# Patient Record
Sex: FEMALE | Race: WHITE | Employment: FULL TIME | URBAN - METROPOLITAN AREA
[De-identification: names, ages, dates, MRNs, and addresses within clinical notes are randomized per-mention and may not be internally consistent; named-entity substitution may affect disease eponyms.]

---

## 2018-10-12 ENCOUNTER — TELEPHONE (OUTPATIENT)
Dept: INTERNAL MEDICINE | Age: 33
End: 2018-10-12

## 2019-04-05 ENCOUNTER — HOSPITAL ENCOUNTER (OUTPATIENT)
Age: 34
Discharge: HOME | End: 2019-04-07

## 2019-04-05 PROCEDURE — 86706 HEP B SURFACE ANTIBODY: CPT

## 2019-04-05 PROCEDURE — 86762 RUBELLA ANTIBODY: CPT

## 2019-04-05 PROCEDURE — 86765 RUBEOLA ANTIBODY: CPT

## 2019-04-05 PROCEDURE — 86735 MUMPS ANTIBODY: CPT

## 2019-04-05 PROCEDURE — 86317 IMMUNOASSAY INFECTIOUS AGENT: CPT

## 2019-04-05 PROCEDURE — 86787 VARICELLA-ZOSTER ANTIBODY: CPT

## 2019-04-09 LAB
MEASLES IMMUNE (IGG): NORMAL
MUMPS AB IGG: NORMAL
RUBELLA ANTIBODY IGG: NORMAL
VARICELLA-ZOSTER VIRUS AB, IGG: NORMAL

## 2019-04-11 LAB — MISCELLANEOUS LAB TEST RESULT: NORMAL

## 2019-11-29 ENCOUNTER — HOSPITAL ENCOUNTER (EMERGENCY)
Age: 34
Discharge: HOME | End: 2019-11-29

## 2019-11-29 VITALS
SYSTOLIC BLOOD PRESSURE: 120 MMHG | DIASTOLIC BLOOD PRESSURE: 78 MMHG | RESPIRATION RATE: 20 BRPM | WEIGHT: 172 LBS | OXYGEN SATURATION: 99 % | TEMPERATURE: 97.9 F | BODY MASS INDEX: 26.15 KG/M2 | HEART RATE: 60 BPM

## 2019-11-29 DIAGNOSIS — J01.10 ACUTE NON-RECURRENT FRONTAL SINUSITIS: Primary | ICD-10-CM

## 2019-11-29 PROCEDURE — 99212 OFFICE O/P EST SF 10 MIN: CPT

## 2019-11-29 RX ORDER — PREDNISONE 20 MG/1
TABLET ORAL
Qty: 10 TABLET | Refills: 0 | Status: SHIPPED | OUTPATIENT
Start: 2019-11-29 | End: 2020-03-18

## 2019-11-29 RX ORDER — PSEUDOEPHEDRINE HCL 60 MG/1
60 TABLET ORAL EVERY 8 HOURS
Qty: 12 TABLET | Refills: 0 | Status: SHIPPED | OUTPATIENT
Start: 2019-11-29 | End: 2020-03-18

## 2019-11-29 RX ORDER — AMOXICILLIN AND CLAVULANATE POTASSIUM 875; 125 MG/1; MG/1
1 TABLET, FILM COATED ORAL 2 TIMES DAILY
Qty: 14 TABLET | Refills: 0 | Status: SHIPPED | OUTPATIENT
Start: 2019-11-29 | End: 2019-12-06

## 2019-11-29 ASSESSMENT — PAIN DESCRIPTION - PAIN TYPE: TYPE: ACUTE PAIN

## 2019-11-29 ASSESSMENT — PAIN SCALES - GENERAL: PAINLEVEL_OUTOF10: 9

## 2019-11-29 ASSESSMENT — PAIN DESCRIPTION - LOCATION: LOCATION: HEAD

## 2020-03-18 ENCOUNTER — OFFICE VISIT (OUTPATIENT)
Dept: FAMILY MEDICINE CLINIC | Age: 35
End: 2020-03-18
Payer: COMMERCIAL

## 2020-03-18 VITALS
RESPIRATION RATE: 18 BRPM | WEIGHT: 186 LBS | BODY MASS INDEX: 28.19 KG/M2 | SYSTOLIC BLOOD PRESSURE: 116 MMHG | HEIGHT: 68 IN | HEART RATE: 62 BPM | DIASTOLIC BLOOD PRESSURE: 68 MMHG | TEMPERATURE: 97.8 F | OXYGEN SATURATION: 99 %

## 2020-03-18 PROCEDURE — 99203 OFFICE O/P NEW LOW 30 MIN: CPT | Performed by: INTERNAL MEDICINE

## 2020-03-18 ASSESSMENT — ENCOUNTER SYMPTOMS
SHORTNESS OF BREATH: 0
EYE DISCHARGE: 0
SINUS PAIN: 0
EYE PAIN: 0
ABDOMINAL PAIN: 0
BLOOD IN STOOL: 0
SORE THROAT: 0
NAUSEA: 0

## 2020-03-18 ASSESSMENT — PATIENT HEALTH QUESTIONNAIRE - PHQ9
SUM OF ALL RESPONSES TO PHQ QUESTIONS 1-9: 0
1. LITTLE INTEREST OR PLEASURE IN DOING THINGS: 0
SUM OF ALL RESPONSES TO PHQ9 QUESTIONS 1 & 2: 0
SUM OF ALL RESPONSES TO PHQ QUESTIONS 1-9: 0
2. FEELING DOWN, DEPRESSED OR HOPELESS: 0

## 2020-03-18 NOTE — PROGRESS NOTES
Chief Complaint   Patient presents with   [address]     no pcp since pedit. HPI:  Patient is here  As new patient   Had noticed numbness and occ tingling in feet and hands ryder at night    Had h/o Migraine in past  Recently restarted of having HAs    FH - noted           Allergy and Medications are reviewed and updated. Past Medical History, Surgical History, and Family History has been reviewed and updated. Review of Systems:  Review of Systems   Constitutional: Negative for chills and fever. HENT: Negative for congestion, sinus pain and sore throat. Eyes: Negative for pain and discharge. Respiratory: Negative for shortness of breath (No new SOb). Cardiovascular: Negative for chest pain. Gastrointestinal: Negative for abdominal pain, blood in stool and nausea. Genitourinary: Negative for flank pain and frequency. Musculoskeletal: Negative for neck pain. Hematological: Does not bruise/bleed easily. Psychiatric/Behavioral: Negative for suicidal ideas. Vitals:    03/18/20 1115   BP: 116/68   Pulse: 62   Resp: 18   Temp: 97.8 °F (36.6 °C)   SpO2: 99%   Weight: 186 lb (84.4 kg)   Height: 5' 8\" (1.727 m)       Physical Exam  Vitals signs reviewed. Constitutional:       Appearance: She is well-developed. HENT:      Head: Normocephalic and atraumatic. Mouth/Throat:      Pharynx: No oropharyngeal exudate. Eyes:      Conjunctiva/sclera: Conjunctivae normal.      Pupils: Pupils are equal, round, and reactive to light. Neck:      Vascular: No JVD. Cardiovascular:      Rate and Rhythm: Normal rate and regular rhythm. Pulmonary:      Effort: Pulmonary effort is normal.      Breath sounds: Normal breath sounds. No rales. Abdominal:      General: Bowel sounds are normal.      Palpations: Abdomen is soft. Musculoskeletal: Normal range of motion. Lymphadenopathy:      Cervical: No cervical adenopathy. Skin:     General: Skin is warm and dry.    Neurological: Mental Status: She is alert and oriented to person, place, and time. Psychiatric:         Behavior: Behavior normal.          Labs :    Lab Results   Component Value Date    WBC 5.5 2017    HGB 12.3 2017    HCT 37.5 2017     2017     2017    K 4.4 2017     2017    CREATININE 0.8 2017    BUN 13 2017    CO2 29 2017    TSH 0.683 2017     Lab Results   Component Value Date    COLORU Yellow 2017    NITRU Negative 2017    GLUCOSEU Negative 2017    KETUA Negative 2017    UROBILINOGEN 0.2 2017    BILIRUBINUR Negative 2017           ASSESSMENT     Patient Active Problem List    Diagnosis Date Noted    Antepartum genitourinary tract infection 2013    Previous  delivery, antepartum condition or complication         Diagnosis:     ICD-10-CM    1. Establishing care with new doctor, encounter for Z76.89 CBC Auto Differential     Comprehensive Metabolic Panel, Fasting     Lipid, Fasting     TSH without Reflex     Urinalysis with Microscopic   2. Numbness and tingling in both hands R20.0 Vitamin B12 & Folate    R20.2 Hemoglobin A1C   3. Numbness in feet R20.0    4. Nonintractable episodic headache, unspecified headache type R51 Rheumatoid Factor     Sedimentation Rate     DAISY       PLAN:       Pt is stable on current medical treatment. Continue current treatment plan    Side effects/Adverse effects/Precautions are reviewed with patient. Low salt, Low Carb diet an low fat diet  Continue medications as advised and take them regularly  Regular exercises as advised    Discussed natural and expected course of this diagnosis and need to alert me if symptoms do not follow expected course, or if any worse. Smoking cessation if applicable, discussed with patient. Advise to have ( Fasting) lab test prior to next visit.          There are no Patient Instructions on file for this

## 2020-05-14 ENCOUNTER — HOSPITAL ENCOUNTER (OUTPATIENT)
Age: 35
Discharge: HOME | End: 2020-05-16
Payer: COMMERCIAL

## 2020-05-14 ENCOUNTER — OFFICE VISIT (OUTPATIENT)
Dept: FAMILY MEDICINE CLINIC | Age: 35
End: 2020-05-14
Payer: COMMERCIAL

## 2020-05-14 VITALS
HEART RATE: 74 BPM | RESPIRATION RATE: 18 BRPM | WEIGHT: 185.5 LBS | OXYGEN SATURATION: 98 % | TEMPERATURE: 97.2 F | DIASTOLIC BLOOD PRESSURE: 74 MMHG | BODY MASS INDEX: 28.11 KG/M2 | SYSTOLIC BLOOD PRESSURE: 118 MMHG | HEIGHT: 68 IN

## 2020-05-14 LAB
ALBUMIN SERPL-MCNC: 4.4 G/DL (ref 3.5–5.2)
ALP BLD-CCNC: 60 U/L (ref 35–104)
ALT SERPL-CCNC: 14 U/L (ref 0–32)
ANION GAP SERPL CALCULATED.3IONS-SCNC: 17 MMOL/L (ref 7–16)
AST SERPL-CCNC: 14 U/L (ref 0–31)
BACTERIA: ABNORMAL /HPF
BASOPHILS ABSOLUTE: 0.02 E9/L (ref 0–0.2)
BASOPHILS RELATIVE PERCENT: 0.4 % (ref 0–2)
BILIRUB SERPL-MCNC: 0.7 MG/DL (ref 0–1.2)
BILIRUBIN URINE: NEGATIVE
BLOOD, URINE: NEGATIVE
BUN BLDV-MCNC: 10 MG/DL (ref 6–20)
CALCIUM SERPL-MCNC: 9.4 MG/DL (ref 8.6–10.2)
CHLORIDE BLD-SCNC: 103 MMOL/L (ref 98–107)
CHOLESTEROL, FASTING: 159 MG/DL (ref 0–199)
CLARITY: CLEAR
CO2: 20 MMOL/L (ref 22–29)
COLOR: YELLOW
CREAT SERPL-MCNC: 0.9 MG/DL (ref 0.5–1)
EOSINOPHILS ABSOLUTE: 0.16 E9/L (ref 0.05–0.5)
EOSINOPHILS RELATIVE PERCENT: 3.4 % (ref 0–6)
EPITHELIAL CELLS, UA: ABNORMAL /HPF
FOLATE: 10.5 NG/ML (ref 4.8–24.2)
GFR AFRICAN AMERICAN: >60
GFR NON-AFRICAN AMERICAN: >60 ML/MIN/1.73
GLUCOSE FASTING: 94 MG/DL (ref 74–99)
GLUCOSE URINE: NEGATIVE MG/DL
HBA1C MFR BLD: 5 % (ref 4–5.6)
HCT VFR BLD CALC: 42.3 % (ref 34–48)
HDLC SERPL-MCNC: 43 MG/DL
HEMOGLOBIN: 13.2 G/DL (ref 11.5–15.5)
IMMATURE GRANULOCYTES #: 0.02 E9/L
IMMATURE GRANULOCYTES %: 0.4 % (ref 0–5)
KETONES, URINE: NEGATIVE MG/DL
LDL CHOLESTEROL CALCULATED: 95 MG/DL (ref 0–99)
LEUKOCYTE ESTERASE, URINE: NEGATIVE
LYMPHOCYTES ABSOLUTE: 1.41 E9/L (ref 1.5–4)
LYMPHOCYTES RELATIVE PERCENT: 29.7 % (ref 20–42)
MCH RBC QN AUTO: 28.6 PG (ref 26–35)
MCHC RBC AUTO-ENTMCNC: 31.2 % (ref 32–34.5)
MCV RBC AUTO: 91.8 FL (ref 80–99.9)
MONOCYTES ABSOLUTE: 0.33 E9/L (ref 0.1–0.95)
MONOCYTES RELATIVE PERCENT: 6.9 % (ref 2–12)
NEUTROPHILS ABSOLUTE: 2.81 E9/L (ref 1.8–7.3)
NEUTROPHILS RELATIVE PERCENT: 59.2 % (ref 43–80)
NITRITE, URINE: NEGATIVE
PDW BLD-RTO: 12.8 FL (ref 11.5–15)
PH UA: 7 (ref 5–9)
PLATELET # BLD: 226 E9/L (ref 130–450)
PMV BLD AUTO: 10.6 FL (ref 7–12)
POTASSIUM SERPL-SCNC: 4.3 MMOL/L (ref 3.5–5)
PROTEIN UA: NEGATIVE MG/DL
RBC # BLD: 4.61 E12/L (ref 3.5–5.5)
RBC UA: ABNORMAL /HPF (ref 0–2)
RHEUMATOID FACTOR: <10 IU/ML (ref 0–13)
SEDIMENTATION RATE, ERYTHROCYTE: 10 MM/HR (ref 0–20)
SODIUM BLD-SCNC: 140 MMOL/L (ref 132–146)
SPECIFIC GRAVITY UA: 1.02 (ref 1–1.03)
TOTAL PROTEIN: 7.5 G/DL (ref 6.4–8.3)
TRIGLYCERIDE, FASTING: 107 MG/DL (ref 0–149)
TSH SERPL DL<=0.05 MIU/L-ACNC: 0.89 UIU/ML (ref 0.27–4.2)
UROBILINOGEN, URINE: 0.2 E.U./DL
VITAMIN B-12: 555 PG/ML (ref 211–946)
VLDLC SERPL CALC-MCNC: 21 MG/DL
WBC # BLD: 4.8 E9/L (ref 4.5–11.5)
WBC UA: ABNORMAL /HPF (ref 0–5)

## 2020-05-14 PROCEDURE — G8419 CALC BMI OUT NRM PARAM NOF/U: HCPCS | Performed by: INTERNAL MEDICINE

## 2020-05-14 PROCEDURE — 85025 COMPLETE CBC W/AUTO DIFF WBC: CPT

## 2020-05-14 PROCEDURE — 80061 LIPID PANEL: CPT

## 2020-05-14 PROCEDURE — 80053 COMPREHEN METABOLIC PANEL: CPT

## 2020-05-14 PROCEDURE — 82746 ASSAY OF FOLIC ACID SERUM: CPT

## 2020-05-14 PROCEDURE — 36415 COLL VENOUS BLD VENIPUNCTURE: CPT

## 2020-05-14 PROCEDURE — 82607 VITAMIN B-12: CPT

## 2020-05-14 PROCEDURE — 85651 RBC SED RATE NONAUTOMATED: CPT

## 2020-05-14 PROCEDURE — 86431 RHEUMATOID FACTOR QUANT: CPT

## 2020-05-14 PROCEDURE — 99213 OFFICE O/P EST LOW 20 MIN: CPT | Performed by: INTERNAL MEDICINE

## 2020-05-14 PROCEDURE — 84443 ASSAY THYROID STIM HORMONE: CPT

## 2020-05-14 PROCEDURE — G8427 DOCREV CUR MEDS BY ELIG CLIN: HCPCS | Performed by: INTERNAL MEDICINE

## 2020-05-14 PROCEDURE — 81001 URINALYSIS AUTO W/SCOPE: CPT

## 2020-05-14 PROCEDURE — 1036F TOBACCO NON-USER: CPT | Performed by: INTERNAL MEDICINE

## 2020-05-14 PROCEDURE — 86038 ANTINUCLEAR ANTIBODIES: CPT

## 2020-05-14 PROCEDURE — 83036 HEMOGLOBIN GLYCOSYLATED A1C: CPT

## 2020-05-14 ASSESSMENT — ENCOUNTER SYMPTOMS
EYE PAIN: 0
BLOOD IN STOOL: 0
ABDOMINAL PAIN: 0
SORE THROAT: 0
EYE DISCHARGE: 0
NAUSEA: 0
SINUS PAIN: 0
SHORTNESS OF BREATH: 0

## 2020-05-14 NOTE — PROGRESS NOTES
WBC 4.8 2020    HGB 13.2 2020    HCT 42.3 2020     2020     2017    K 4.4 2017     2017    CREATININE 0.8 2017    BUN 13 2017    CO2 29 2017    TSH 0.683 2017    LABA1C 5.0 2020     Lab Results   Component Value Date    COLORU Yellow 2020    NITRU Negative 2020    GLUCOSEU Negative 2020    KETUA Negative 2020    UROBILINOGEN 0.2 2020    BILIRUBINUR Negative 2020           ASSESSMENT     Patient Active Problem List    Diagnosis Date Noted    Antepartum genitourinary tract infection 2013    Previous  delivery, antepartum condition or complication         Diagnosis:     ICD-10-CM    1. Follow up Z09    2. Nonintractable episodic headache, unspecified headache type R51        PLAN:       Possible migraines attack  Adv to keep Headache diary      Pt is stable on current medical treatment. Continue current treatment plan    Side effects/Adverse effects/Precautions are reviewed with patient. Low salt, Low Carb diet an low fat diet  Continue medications as advised and take them regularly  Regular exercises as advised    Discussed natural and expected course of this diagnosis and need to alert me if symptoms do not follow expected course, or if any worse. Smoking cessation if applicable, discussed with patient. There are no Patient Instructions on file for this visit. Return in about 3 months (around 2020).

## 2020-05-15 LAB — ANTI-NUCLEAR ANTIBODY (ANA): NEGATIVE

## 2020-08-04 ENCOUNTER — OFFICE VISIT (OUTPATIENT)
Dept: FAMILY MEDICINE CLINIC | Age: 35
End: 2020-08-04
Payer: COMMERCIAL

## 2020-08-04 VITALS
DIASTOLIC BLOOD PRESSURE: 60 MMHG | OXYGEN SATURATION: 99 % | HEIGHT: 68 IN | HEART RATE: 71 BPM | WEIGHT: 189 LBS | BODY MASS INDEX: 28.64 KG/M2 | RESPIRATION RATE: 20 BRPM | TEMPERATURE: 97.1 F | SYSTOLIC BLOOD PRESSURE: 106 MMHG

## 2020-08-04 PROCEDURE — 1036F TOBACCO NON-USER: CPT | Performed by: INTERNAL MEDICINE

## 2020-08-04 PROCEDURE — G8419 CALC BMI OUT NRM PARAM NOF/U: HCPCS | Performed by: INTERNAL MEDICINE

## 2020-08-04 PROCEDURE — 99213 OFFICE O/P EST LOW 20 MIN: CPT | Performed by: INTERNAL MEDICINE

## 2020-08-04 PROCEDURE — G8427 DOCREV CUR MEDS BY ELIG CLIN: HCPCS | Performed by: INTERNAL MEDICINE

## 2020-08-04 RX ORDER — SUMATRIPTAN 50 MG/1
50 TABLET, FILM COATED ORAL DAILY PRN
Qty: 6 TABLET | Refills: 0 | Status: SHIPPED
Start: 2020-08-04 | End: 2020-09-09 | Stop reason: SDUPTHER

## 2020-08-04 RX ORDER — TOPIRAMATE 25 MG/1
TABLET ORAL
Qty: 60 TABLET | Refills: 0 | Status: SHIPPED
Start: 2020-08-04 | End: 2020-09-09 | Stop reason: SDUPTHER

## 2020-08-04 ASSESSMENT — ENCOUNTER SYMPTOMS
SHORTNESS OF BREATH: 0
BLOOD IN STOOL: 0
ABDOMINAL PAIN: 0
SORE THROAT: 0
NAUSEA: 0
SINUS PAIN: 0
EYE PAIN: 0
EYE DISCHARGE: 0

## 2020-08-04 NOTE — PROGRESS NOTES
Chief Complaint   Patient presents with    Follow-up    Migraine     Since last visit patient has experianced 5 episodic flare up's lasting from 1.5 days to 3 days        HPI:  Patient is here for follow-up     Has c/o migraine , as above  Missed days at work    Usually start on left side  Associate with screen time, lack of sleep   Occ nausea, vomiting  Sensitive to Lights, smell, during attack    Has HA for 3 yrs      Allergy and Medications are reviewed and updated. Past Medical History, Surgical History, and Family History has been reviewed and updated. Review of Systems:  Review of Systems   Constitutional: Negative for chills and fever. HENT: Negative for congestion, sinus pain and sore throat. Eyes: Negative for pain and discharge. Respiratory: Negative for shortness of breath (No new SOb). Cardiovascular: Negative for chest pain. Gastrointestinal: Negative for abdominal pain, blood in stool and nausea. Genitourinary: Negative for flank pain and frequency. Musculoskeletal: Negative for neck pain. Hematological: Does not bruise/bleed easily. Psychiatric/Behavioral: Negative for suicidal ideas. Vitals:    08/04/20 1025   BP: 106/60   Pulse: 71   Resp: 20   Temp: 97.1 °F (36.2 °C)   TempSrc: Temporal   SpO2: 99%   Weight: 189 lb (85.7 kg)   Height: 5' 8\" (1.727 m)       Physical Exam  Vitals signs reviewed. Constitutional:       Appearance: She is well-developed. HENT:      Head: Normocephalic and atraumatic. Mouth/Throat:      Pharynx: No oropharyngeal exudate. Eyes:      Conjunctiva/sclera: Conjunctivae normal.      Pupils: Pupils are equal, round, and reactive to light. Neck:      Vascular: No JVD. Cardiovascular:      Rate and Rhythm: Normal rate and regular rhythm. Pulmonary:      Effort: Pulmonary effort is normal.      Breath sounds: Normal breath sounds. No rales. Abdominal:      General: Bowel sounds are normal.      Palpations: Abdomen is soft. Musculoskeletal: Normal range of motion. Lymphadenopathy:      Cervical: No cervical adenopathy. Skin:     General: Skin is warm and dry. Neurological:      Mental Status: She is alert and oriented to person, place, and time. Psychiatric:         Behavior: Behavior normal.          Labs :    Lab Results   Component Value Date    WBC 4.8 2020    HGB 13.2 2020    HCT 42.3 2020     2020    HDL 43 2020    ALT 14 2020    AST 14 2020     2020    K 4.3 2020     2020    CREATININE 0.9 2020    BUN 10 2020    CO2 20 (L) 2020    TSH 0.892 2020    GLUF 94 2020    LABA1C 5.0 2020     Lab Results   Component Value Date    COLORU Yellow 2020    NITRU Negative 2020    GLUCOSEU Negative 2020    KETUA Negative 2020    UROBILINOGEN 0.2 2020    BILIRUBINUR Negative 2020             ASSESSMENT     Patient Active Problem List    Diagnosis Date Noted    Antepartum genitourinary tract infection 2013    Previous  delivery, antepartum condition or complication         Diagnosis:     ICD-10-CM    1. Other migraine without status migrainosus, not intractable  G43.809 CT HEAD WO CONTRAST       PLAN:      Topamax 25 qd--> bid after one week    Imitrex 50 as needed for migraine. ,No more than two tabs in 24 hrs    CT head- no contrast     Pt is stable on current medical treatment. Continue current treatment plan    Side effects/Adverse effects/Precautions are reviewed with patient. Low salt, Low Carb diet an low fat diet  Continue medications as advised and take them regularly  Regular exercises as advised    Discussed natural and expected course of this diagnosis and need to alert me if symptoms do not follow expected course, or if any worse. Smoking cessation if applicable, discussed with patient.          There are no Patient Instructions on file for

## 2020-08-10 ENCOUNTER — TELEPHONE (OUTPATIENT)
Dept: FAMILY MEDICINE CLINIC | Age: 35
End: 2020-08-10

## 2020-09-09 ENCOUNTER — OFFICE VISIT (OUTPATIENT)
Dept: FAMILY MEDICINE CLINIC | Age: 35
End: 2020-09-09
Payer: COMMERCIAL

## 2020-09-09 VITALS
WEIGHT: 185 LBS | HEART RATE: 69 BPM | RESPIRATION RATE: 16 BRPM | TEMPERATURE: 98.8 F | HEIGHT: 68 IN | DIASTOLIC BLOOD PRESSURE: 64 MMHG | BODY MASS INDEX: 28.04 KG/M2 | OXYGEN SATURATION: 98 % | SYSTOLIC BLOOD PRESSURE: 98 MMHG

## 2020-09-09 PROCEDURE — 1036F TOBACCO NON-USER: CPT | Performed by: INTERNAL MEDICINE

## 2020-09-09 PROCEDURE — 99213 OFFICE O/P EST LOW 20 MIN: CPT | Performed by: INTERNAL MEDICINE

## 2020-09-09 PROCEDURE — G8419 CALC BMI OUT NRM PARAM NOF/U: HCPCS | Performed by: INTERNAL MEDICINE

## 2020-09-09 PROCEDURE — G8427 DOCREV CUR MEDS BY ELIG CLIN: HCPCS | Performed by: INTERNAL MEDICINE

## 2020-09-09 RX ORDER — TOPIRAMATE 25 MG/1
TABLET ORAL
Qty: 60 TABLET | Refills: 0 | Status: CANCELLED | OUTPATIENT
Start: 2020-09-09

## 2020-09-09 RX ORDER — TOPIRAMATE 25 MG/1
TABLET ORAL
Qty: 90 TABLET | Refills: 2 | Status: SHIPPED
Start: 2020-09-09 | End: 2021-01-18 | Stop reason: SDUPTHER

## 2020-09-09 RX ORDER — SUMATRIPTAN 50 MG/1
50 TABLET, FILM COATED ORAL DAILY PRN
Qty: 6 TABLET | Refills: 1 | Status: SHIPPED
Start: 2020-09-09 | End: 2020-11-18 | Stop reason: SDUPTHER

## 2020-09-09 ASSESSMENT — ENCOUNTER SYMPTOMS
ABDOMINAL PAIN: 0
SORE THROAT: 0
NAUSEA: 0
BLOOD IN STOOL: 0
EYE DISCHARGE: 0
SHORTNESS OF BREATH: 0
SINUS PAIN: 0
EYE PAIN: 0

## 2020-09-09 NOTE — PROGRESS NOTES
Chief Complaint   Patient presents with    Follow-up    Medication Refill       HPI:  Patient is here for follow-up     Pt is tolerating Topamax    Migraine intensity and frequency -- Are better         Allergy and Medications are reviewed and updated. Past Medical History, Surgical History, and Family History has been reviewed and updated. Review of Systems:  Review of Systems   Constitutional: Negative for chills and fever. HENT: Negative for congestion, sinus pain and sore throat. Eyes: Negative for pain and discharge. Respiratory: Negative for shortness of breath (No new SOb). Cardiovascular: Negative for chest pain. Gastrointestinal: Negative for abdominal pain, blood in stool and nausea. Genitourinary: Negative for flank pain and frequency. Musculoskeletal: Negative for neck pain. Hematological: Does not bruise/bleed easily. Psychiatric/Behavioral: Negative for suicidal ideas. Vitals:    09/09/20 1517   BP: 98/64   Pulse: 69   Resp: 16   Temp: 98.8 °F (37.1 °C)   TempSrc: Temporal   SpO2: 98%   Weight: 185 lb (83.9 kg)   Height: 5' 8\" (1.727 m)       Physical Exam  Vitals signs reviewed. Constitutional:       Appearance: She is well-developed. HENT:      Head: Normocephalic and atraumatic. Mouth/Throat:      Pharynx: No oropharyngeal exudate. Eyes:      Conjunctiva/sclera: Conjunctivae normal.      Pupils: Pupils are equal, round, and reactive to light. Neck:      Vascular: No JVD. Cardiovascular:      Rate and Rhythm: Normal rate and regular rhythm. Pulmonary:      Effort: Pulmonary effort is normal.      Breath sounds: Normal breath sounds. No rales. Abdominal:      General: Bowel sounds are normal.      Palpations: Abdomen is soft. Musculoskeletal: Normal range of motion. Lymphadenopathy:      Cervical: No cervical adenopathy. Skin:     General: Skin is warm and dry.    Neurological:      Mental Status: She is alert and oriented to person, place, and time.   Psychiatric:         Behavior: Behavior normal.          Labs :    Lab Results   Component Value Date    WBC 4.8 2020    HGB 13.2 2020    HCT 42.3 2020     2020    HDL 43 2020    ALT 14 2020    AST 14 2020     2020    K 4.3 2020     2020    CREATININE 0.9 2020    BUN 10 2020    CO2 20 (L) 2020    TSH 0.892 2020    GLUF 94 2020    LABA1C 5.0 2020     Lab Results   Component Value Date    COLORU Yellow 2020    NITRU Negative 2020    GLUCOSEU Negative 2020    KETUA Negative 2020    UROBILINOGEN 0.2 2020    BILIRUBINUR Negative 2020           ASSESSMENT     Patient Active Problem List    Diagnosis Date Noted    Antepartum genitourinary tract infection 2013    Previous  delivery, antepartum condition or complication         Diagnosis:     ICD-10-CM    1. Other migraine without status migrainosus, not intractable  G43.809 Comprehensive Metabolic Panel, Fasting     CBC Auto Differential       PLAN:      Increase Topamax to 25 mg in AM and 50 mg at night    [place] for MRI    Pt is stable on current medical treatment. Continue current treatment plan    Side effects/Adverse effects/Precautions are reviewed with patient. Low salt, Low Carb diet an low fat diet  Continue medications as advised and take them regularly  Regular exercises as advised    Discussed natural and expected course of this diagnosis and need to alert me if symptoms do not follow expected course, or if any worse. Smoking cessation if applicable, discussed with patient. There are no Patient Instructions on file for this visit. Return in about 6 weeks (around 10/21/2020).

## 2020-09-24 ENCOUNTER — TELEPHONE (OUTPATIENT)
Dept: FAMILY MEDICINE CLINIC | Age: 35
End: 2020-09-24

## 2020-09-24 NOTE — TELEPHONE ENCOUNTER
Jamil Levy  Has been experiencing dizziness, forget fullness,  Feel like she is irritable. cant seem to focus. This  Has been going on since starting the Topamax.     Pharmacy [facility] on [address]

## 2020-10-29 ENCOUNTER — TELEPHONE (OUTPATIENT)
Dept: ADMINISTRATIVE | Age: 35
End: 2020-10-29

## 2020-10-29 NOTE — TELEPHONE ENCOUNTER
Pt states she had to cancel her last appt due to being called in to work. Was wanting to know if she should do her lab work just prior to her December appt scheduled. Please contact and advise. Thank you.

## 2020-11-05 ENCOUNTER — HOSPITAL ENCOUNTER (OUTPATIENT)
Dept: MAMMOGRAPHY | Age: 35
Discharge: HOME | End: 2020-11-07
Payer: COMMERCIAL

## 2020-11-05 ENCOUNTER — HOSPITAL ENCOUNTER (OUTPATIENT)
Dept: ULTRASOUND IMAGING | Age: 35
Discharge: HOME | End: 2020-11-05
Payer: COMMERCIAL

## 2020-11-05 PROCEDURE — 76642 ULTRASOUND BREAST LIMITED: CPT

## 2020-11-05 PROCEDURE — G0279 TOMOSYNTHESIS, MAMMO: HCPCS

## 2020-11-17 RX ORDER — SUMATRIPTAN 50 MG/1
TABLET, FILM COATED ORAL
Qty: 6 TABLET | Refills: 1 | OUTPATIENT
Start: 2020-11-17

## 2020-11-18 RX ORDER — SUMATRIPTAN 50 MG/1
50 TABLET, FILM COATED ORAL DAILY PRN
Qty: 6 TABLET | Refills: 1 | Status: SHIPPED
Start: 2020-11-18 | End: 2021-01-18 | Stop reason: SDUPTHER

## 2020-11-18 NOTE — TELEPHONE ENCOUNTER
Patient called for refill Imitrex. Patient stated headaches seem to be getting worse and she wants to discuss at next appt.      Last seen 9/9/2020  Next appt 12/7/2020  [facility]

## 2020-12-03 DIAGNOSIS — G43.809 OTHER MIGRAINE WITHOUT STATUS MIGRAINOSUS, NOT INTRACTABLE: ICD-10-CM

## 2020-12-03 LAB
ALBUMIN SERPL-MCNC: 4 G/DL (ref 3.5–5.2)
ALP BLD-CCNC: 63 U/L (ref 35–104)
ALT SERPL-CCNC: 11 U/L (ref 0–32)
ANION GAP SERPL CALCULATED.3IONS-SCNC: 9 MMOL/L (ref 7–16)
AST SERPL-CCNC: 11 U/L (ref 0–31)
BASOPHILS ABSOLUTE: 0.03 E9/L (ref 0–0.2)
BASOPHILS RELATIVE PERCENT: 0.5 % (ref 0–2)
BILIRUB SERPL-MCNC: 0.5 MG/DL (ref 0–1.2)
BUN BLDV-MCNC: 11 MG/DL (ref 6–20)
CALCIUM SERPL-MCNC: 8.9 MG/DL (ref 8.6–10.2)
CHLORIDE BLD-SCNC: 107 MMOL/L (ref 98–107)
CO2: 22 MMOL/L (ref 22–29)
CREAT SERPL-MCNC: 0.9 MG/DL (ref 0.5–1)
EOSINOPHILS ABSOLUTE: 0.13 E9/L (ref 0.05–0.5)
EOSINOPHILS RELATIVE PERCENT: 2.1 % (ref 0–6)
GFR AFRICAN AMERICAN: >60
GFR NON-AFRICAN AMERICAN: >60 ML/MIN/1.73
GLUCOSE FASTING: 92 MG/DL (ref 74–99)
HCT VFR BLD CALC: 38.5 % (ref 34–48)
HEMOGLOBIN: 12.7 G/DL (ref 11.5–15.5)
IMMATURE GRANULOCYTES #: 0.02 E9/L
IMMATURE GRANULOCYTES %: 0.3 % (ref 0–5)
LYMPHOCYTES ABSOLUTE: 1.86 E9/L (ref 1.5–4)
LYMPHOCYTES RELATIVE PERCENT: 30 % (ref 20–42)
MCH RBC QN AUTO: 29.7 PG (ref 26–35)
MCHC RBC AUTO-ENTMCNC: 33 % (ref 32–34.5)
MCV RBC AUTO: 90.2 FL (ref 80–99.9)
MONOCYTES ABSOLUTE: 0.45 E9/L (ref 0.1–0.95)
MONOCYTES RELATIVE PERCENT: 7.3 % (ref 2–12)
NEUTROPHILS ABSOLUTE: 3.7 E9/L (ref 1.8–7.3)
NEUTROPHILS RELATIVE PERCENT: 59.8 % (ref 43–80)
PDW BLD-RTO: 13 FL (ref 11.5–15)
PLATELET # BLD: 220 E9/L (ref 130–450)
PMV BLD AUTO: 10.3 FL (ref 7–12)
POTASSIUM SERPL-SCNC: 4.1 MMOL/L (ref 3.5–5)
RBC # BLD: 4.27 E12/L (ref 3.5–5.5)
SODIUM BLD-SCNC: 138 MMOL/L (ref 132–146)
TOTAL PROTEIN: 7.2 G/DL (ref 6.4–8.3)
WBC # BLD: 6.2 E9/L (ref 4.5–11.5)

## 2020-12-07 ENCOUNTER — OFFICE VISIT (OUTPATIENT)
Dept: FAMILY MEDICINE CLINIC | Age: 35
End: 2020-12-07
Payer: COMMERCIAL

## 2020-12-07 VITALS
RESPIRATION RATE: 16 BRPM | DIASTOLIC BLOOD PRESSURE: 60 MMHG | OXYGEN SATURATION: 100 % | WEIGHT: 193.2 LBS | BODY MASS INDEX: 29.28 KG/M2 | HEIGHT: 68 IN | SYSTOLIC BLOOD PRESSURE: 104 MMHG | HEART RATE: 82 BPM | TEMPERATURE: 97.3 F

## 2020-12-07 PROCEDURE — G8419 CALC BMI OUT NRM PARAM NOF/U: HCPCS | Performed by: INTERNAL MEDICINE

## 2020-12-07 PROCEDURE — G8427 DOCREV CUR MEDS BY ELIG CLIN: HCPCS | Performed by: INTERNAL MEDICINE

## 2020-12-07 PROCEDURE — 1036F TOBACCO NON-USER: CPT | Performed by: INTERNAL MEDICINE

## 2020-12-07 PROCEDURE — 99213 OFFICE O/P EST LOW 20 MIN: CPT | Performed by: INTERNAL MEDICINE

## 2020-12-07 PROCEDURE — G8484 FLU IMMUNIZE NO ADMIN: HCPCS | Performed by: INTERNAL MEDICINE

## 2020-12-07 RX ORDER — CETIRIZINE HYDROCHLORIDE 10 MG/1
10 TABLET ORAL NIGHTLY
COMMUNITY
End: 2021-11-09

## 2020-12-07 RX ORDER — FLUTICASONE PROPIONATE 50 MCG
1 SPRAY, SUSPENSION (ML) NASAL DAILY
Qty: 2 BOTTLE | Refills: 1 | Status: SHIPPED
Start: 2020-12-07 | End: 2022-08-10 | Stop reason: SDUPTHER

## 2020-12-07 ASSESSMENT — ENCOUNTER SYMPTOMS
SINUS PAIN: 0
SHORTNESS OF BREATH: 0
EYE DISCHARGE: 0
BLOOD IN STOOL: 0
EYE PAIN: 0
ABDOMINAL PAIN: 0
SORE THROAT: 0
NAUSEA: 0

## 2020-12-07 NOTE — PROGRESS NOTES
Chief Complaint   Patient presents with    Follow-up       HPI:  Patient is here for follow-up     Pt's HA were bettre with Topamax 75 mg a day, but could not tolearte    Now taking Topamax 25 mgf daily at night    HA - is more frequent    Takes Imitrex  mg as needed        Feeling okay     Allergy and Medications are reviewed and updated. Past Medical History, Surgical History, and Family History has been reviewed and updated. Review of Systems:  Review of Systems   Constitutional: Negative for chills and fever. HENT: Negative for congestion, sinus pain and sore throat. Eyes: Negative for pain and discharge. Respiratory: Negative for shortness of breath (No new SOb). Cardiovascular: Negative for chest pain. Gastrointestinal: Negative for abdominal pain, blood in stool and nausea. Genitourinary: Negative for flank pain and frequency. Musculoskeletal: Negative for neck pain. Hematological: Does not bruise/bleed easily. Psychiatric/Behavioral: Negative for suicidal ideas. Vitals:    12/07/20 0832   BP: 104/60   Pulse: 82   Resp: 16   Temp: 97.3 °F (36.3 °C)   TempSrc: Temporal   SpO2: 100%   Weight: 193 lb 3.2 oz (87.6 kg)   Height: 5' 8\" (1.727 m)       Physical Exam  Vitals signs reviewed. Constitutional:       Appearance: She is well-developed. HENT:      Head: Normocephalic and atraumatic. Mouth/Throat:      Pharynx: No oropharyngeal exudate. Eyes:      Conjunctiva/sclera: Conjunctivae normal.      Pupils: Pupils are equal, round, and reactive to light. Neck:      Vascular: No JVD. Cardiovascular:      Rate and Rhythm: Normal rate and regular rhythm. Pulmonary:      Effort: Pulmonary effort is normal.      Breath sounds: Normal breath sounds. No rales. Abdominal:      General: Bowel sounds are normal.      Palpations: Abdomen is soft. Musculoskeletal: Normal range of motion. Lymphadenopathy:      Cervical: No cervical adenopathy.    Skin:     General: Skin is warm and dry. Neurological:      Mental Status: She is alert and oriented to person, place, and time. Psychiatric:         Behavior: Behavior normal.          Labs :    Lab Results   Component Value Date    WBC 6.2 2020    HGB 12.7 2020    HCT 38.5 2020     2020    HDL 43 2020    ALT 11 2020    AST 11 2020     2020    K 4.1 2020     2020    CREATININE 0.9 2020    BUN 11 2020    CO2 22 2020    TSH 0.892 2020    GLUF 92 2020    LABA1C 5.0 2020     Lab Results   Component Value Date    COLORU Yellow 2020    NITRU Negative 2020    GLUCOSEU Negative 2020    KETUA Negative 2020    UROBILINOGEN 0.2 2020    BILIRUBINUR Negative 2020             ASSESSMENT     Patient Active Problem List    Diagnosis Date Noted    Antepartum genitourinary tract infection 2013    Previous  delivery, antepartum condition or complication         Diagnosis:     ICD-10-CM    1. Other migraine without status migrainosus, not intractable  G43.809 Lilly Bo MD, Neurology, [place]   2. Chronic sinusitis, unspecified location  J32.9        PLAN:      Discussed with patient  Will Add Zyrtec 10 mg daily at night  Flonase As needed    Ref to Neurology    Pt is stable on current medical treatment. Continue current treatment plan    Side effects/Adverse effects/Precautions are reviewed with patient. Low salt, Low Carb diet an low fat diet  Continue medications as advised and take them regularly  Regular exercises as advised    Discussed natural and expected course of this diagnosis and need to alert me if symptoms do not follow expected course, or if any worse. Smoking cessation if applicable, discussed with patient. There are no Patient Instructions on file for this visit. Return in about 2 months (around 2021).

## 2020-12-08 ENCOUNTER — OFFICE VISIT (OUTPATIENT)
Dept: NEUROLOGY | Age: 35
End: 2020-12-08
Payer: COMMERCIAL

## 2020-12-08 VITALS
HEIGHT: 68 IN | SYSTOLIC BLOOD PRESSURE: 110 MMHG | TEMPERATURE: 97.2 F | BODY MASS INDEX: 29.25 KG/M2 | DIASTOLIC BLOOD PRESSURE: 70 MMHG | WEIGHT: 193 LBS

## 2020-12-08 PROBLEM — Z86.69 HISTORY OF MIGRAINE: Chronic | Status: ACTIVE | Noted: 2020-12-08

## 2020-12-08 PROBLEM — J32.2 ETHMOID SINUSITIS: Chronic | Status: ACTIVE | Noted: 2020-12-08

## 2020-12-08 PROBLEM — G43.009 MIGRAINE WITHOUT AURA AND WITHOUT STATUS MIGRAINOSUS, NOT INTRACTABLE: Status: ACTIVE | Noted: 2020-12-08

## 2020-12-08 PROCEDURE — G8419 CALC BMI OUT NRM PARAM NOF/U: HCPCS | Performed by: PSYCHIATRY & NEUROLOGY

## 2020-12-08 PROCEDURE — 99204 OFFICE O/P NEW MOD 45 MIN: CPT | Performed by: PSYCHIATRY & NEUROLOGY

## 2020-12-08 PROCEDURE — G8484 FLU IMMUNIZE NO ADMIN: HCPCS | Performed by: PSYCHIATRY & NEUROLOGY

## 2020-12-08 PROCEDURE — G8427 DOCREV CUR MEDS BY ELIG CLIN: HCPCS | Performed by: PSYCHIATRY & NEUROLOGY

## 2020-12-08 PROCEDURE — 1036F TOBACCO NON-USER: CPT | Performed by: PSYCHIATRY & NEUROLOGY

## 2020-12-08 ASSESSMENT — ENCOUNTER SYMPTOMS
RESPIRATORY NEGATIVE: 1
EYES NEGATIVE: 1
ALLERGIC/IMMUNOLOGIC NEGATIVE: 1
GASTROINTESTINAL NEGATIVE: 1

## 2020-12-08 NOTE — PROGRESS NOTES
taking differently: Take 25 mg by mouth nightly ) 90 tablet 2     No current facility-administered medications for this visit.         No Known Allergies    Patient Active Problem List   Diagnosis    Antepartum genitourinary tract infection    Previous  delivery, antepartum condition or complication    History of migraine    Ethmoid sinusitis    Migraine without aura and without status migrainosus, not intractable       Past Medical History:   Diagnosis Date    ADHD (attention deficit hyperactivity disorder)     Complication of anesthesia     states spinal did not last entire case    Ethmoid sinusitis 2020    History of migraine 2020    Migraine without aura and without status migrainosus, not intractable 2020    Postpartum depression     with first child    Thrombophilia ([facility] [address]        Past Surgical History:   Procedure Laterality Date    ABDOMEN SURGERY       section x 2     SECTION      CHOLECYSTECTOMY         Family History   Problem Relation Age of Onset    High Blood Pressure Mother     High Cholesterol Mother     High Cholesterol Father     High Blood Pressure Father     High Cholesterol Brother     High Blood Pressure Brother     Diabetes Maternal Grandmother     High Blood Pressure Maternal Grandmother     High Cholesterol Maternal Grandmother     Heart Disease Maternal Grandfather     High Cholesterol Maternal Grandfather     High Blood Pressure Maternal Grandfather     Cancer Paternal Grandfather        Social History     Socioeconomic History    Marital status:      Spouse name: Not on file    Number of children: Not on file    Years of education: Not on file    Highest education level: Not on file   Occupational History    Not on file   Social Needs    Financial resource strain: Not on file    Food insecurity     Worry: Not on file     Inability: Not on file    Transportation needs     Medical: Not on file     Non-medical: edema, erythema or cyanosis  Skin: No apparent lesions or rashes  Musculoskeletal: No fasciculations or tremors  Mental Status: Alert, oriented x3  Speech/Language: Clear, fluent  Attention span/Concentration: Grossly intact  Affect/Mood: Mildly anxious  Insight/Judgement: Fairly good     Fund of Knowledge/Current events: Grossly intact  CN II-XII:     Pupils: Equal, reactive to light, 3.0 mm     EOM's: Full without nystagmus  Visual Fields: Grossly full  Fundi: Miosis to light  CN V: normal V1-V3  CN VII: No facial droop  CN VIII: Grossly intact  CN IX-XII: Tongue midline  SCM/Trapezii: 5/5 power  Motor: 5/5 power in the upper and lower extremities without tremor or drift and normal motor tone. Intact fine motor function of both hands, symmetric. DTR's: 1+ and symmetric in the upper and lower extremities, no ankle clonus, plantar responses are flexor. Sensory: Intact subjectively to light touch and sharp stick testing throughout. Normal vibratory sensation at wrists, ankles. Coordination/Gait: Grossly intact finger-to-nose testing, no truncal or gait ataxia. Assessment/Plan:  1. 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 2. For headache prophylaxis as she is already prescribed topiramate, I advised her to try taking 50 mg at bedtime. The other option discussed would be the addition of an antidepressant pain medicine such as nortriptyline 10-20 mg at bedtime or amitriptyline 10-20 mg at bedtime. Divalproex  mg at bedtime could be utilized as long as it's maintained at the lower end of the dosing schedule to reduce potential side effects. Vascular headache medications are probably contraindicated as her blood pressure is in the lower range. 3. Maintain a headache calendar and follow-up in the Neurology clinic within 6 weeks by means of a virtual video telehealth visit.   4. Patient information was provided from the [address] [place] [address] website. Sincerely,      Taisha Alcala MD    This note was created using speech recognition transcription software. Despite proofreading, there may be several typographical errors present that may affect the meaning of the content. Please call with any questions. Note: More than 50% of this 40-minute face-face visit time included counseling and coordination of care based on clinical impression, review of test results, treatment plan, risk factor reduction and patient and/or family education.

## 2020-12-17 ENCOUNTER — TELEPHONE (OUTPATIENT)
Dept: NEUROLOGY | Age: 35
End: 2020-12-17

## 2020-12-17 RX ORDER — TOPIRAMATE 50 MG/1
50 TABLET, FILM COATED ORAL 2 TIMES DAILY
Qty: 60 TABLET | Refills: 2 | Status: SHIPPED
Start: 2020-12-17 | End: 2021-04-19

## 2020-12-17 NOTE — TELEPHONE ENCOUNTER
Message noted. Patient just seen for first time on 12/8. Because of 10-day history of migraine she is advised to go to the ED for acute migraine treatment. She was taking topiramate 25 mg nightly. Advised her to increase to 50 mg nightly. She may increase topiramate further to 50 mg twice a day for headache prophylaxis; prescription updated.

## 2020-12-17 NOTE — TELEPHONE ENCOUNTER
Patient called in today. She states that she has had a migraine for @ 10 days straight. Please advise.   Electronically signed by Jake Rollins MA on 12/17/20 at 10:40 AM EST

## 2021-01-18 ENCOUNTER — VIRTUAL VISIT (OUTPATIENT)
Dept: NEUROLOGY | Age: 36
End: 2021-01-18
Payer: COMMERCIAL

## 2021-01-18 PROCEDURE — 1036F TOBACCO NON-USER: CPT | Performed by: PSYCHIATRY & NEUROLOGY

## 2021-01-18 PROCEDURE — G8427 DOCREV CUR MEDS BY ELIG CLIN: HCPCS | Performed by: PSYCHIATRY & NEUROLOGY

## 2021-01-18 PROCEDURE — G8419 CALC BMI OUT NRM PARAM NOF/U: HCPCS | Performed by: PSYCHIATRY & NEUROLOGY

## 2021-01-18 PROCEDURE — 99214 OFFICE O/P EST MOD 30 MIN: CPT | Performed by: PSYCHIATRY & NEUROLOGY

## 2021-01-18 PROCEDURE — G8484 FLU IMMUNIZE NO ADMIN: HCPCS | Performed by: PSYCHIATRY & NEUROLOGY

## 2021-01-18 RX ORDER — PROCHLORPERAZINE MALEATE 5 MG/1
5 TABLET ORAL EVERY 8 HOURS PRN
Qty: 120 TABLET | Refills: 1 | Status: SHIPPED
Start: 2021-01-18 | End: 2022-08-11

## 2021-01-18 RX ORDER — SUMATRIPTAN 50 MG/1
50 TABLET, FILM COATED ORAL DAILY PRN
Qty: 9 TABLET | Refills: 2 | Status: SHIPPED
Start: 2021-01-18 | End: 2021-02-15 | Stop reason: SDUPTHER

## 2021-01-18 RX ORDER — METHYLPREDNISOLONE 4 MG/1
TABLET ORAL
Qty: 1 KIT | Refills: 0 | Status: SHIPPED
Start: 2021-01-18 | End: 2021-03-04

## 2021-01-18 RX ORDER — TOPIRAMATE 25 MG/1
TABLET ORAL
Qty: 90 TABLET | Refills: 2 | Status: SHIPPED
Start: 2021-01-18 | End: 2021-03-04

## 2021-01-18 NOTE — PROGRESS NOTES
[address] NEUROLOGY  [address] [facility] [address]  Dept: [phone]    Telehealth follow up Note      Adrian Peacock     Date of Visit:  1/18/2021  Primary Provider: Jaswant Rodriguez MD    CC:  Telehealth Neurology follow up, chronic migraines, migraine exacerbation x 2 days. Consent:  The patient and/or health care decision maker is aware that that he may receive a bill for this tele-health service [employer], depending on his insurance coverage, and has provided verbal consent to proceed: Yes  My patient is aware that they will need a follow-up visit (in-person or virtually) at the appropriate time indicated for continued medications. Further, my patient is aware that when this acute crisis has lifted, they will be expected to return for an in-person visit and all elements of standard local and hospital guidelines in order to continue this medication. HPI: 82-year-old woman with history of migraine headaches, with left hemicranial location. She reports a 2-day history of migraines. She did not advance topiramate to 25 mg in the morning and continues 50 mg at bedtime as previous. I advised her to increase topiramate to 50 mg twice daily at this time. She may increase further to 75 mg twice daily if clinically indicated. I also advised her to try a Medrol Dosepak and renewed her sumatriptan scription. Have also prescribed Compazine 5 mg tablet as needed every 8 hours for nausea associated with migraine. Please refer to prior Neurology consult letter of 12/8/2020 for additional information if needed. ROS, family/social history, medication/allergy list: Reviewed, updated. Headache pain is worse. Appropriate analgesia with current medications regimen: no.    Change in quality of symptoms:no. Medication side effects:not applicable . Recent diagnostic testing:none. Lab data: Reviewed from 12/3/2020. Imaging studies: MR brain scan, 9/11/2020: Unremarkable. Past Medical History: Reviewed    Past Surgical History: Reviewed     Home Medications: Reviewed    Current Outpatient Medications   Medication Sig Dispense Refill    SUMAtriptan (IMITREX) 50 MG tablet Take 1 tablet by mouth daily as needed for Migraine More than 2 in 24 hours. 9 tablet 2    topiramate (TOPAMAX) 25 MG tablet Take 2 twice daily 90 tablet 2    prochlorperazine (COMPAZINE) 5 MG tablet Take 1 tablet by mouth every 8 hours as needed for Nausea 120 tablet 1    topiramate (TOPAMAX) 50 MG tablet Take 1 tablet by mouth 2 times daily 60 tablet 2    fluticasone (FLONASE) 50 MCG/ACT nasal spray 1 spray by Each Nostril route daily 2 Bottle 1    cetirizine (ZYRTEC) 10 MG tablet Take 10 mg by mouth nightly       No current facility-administered medications for this visit. Allergies: Reviewed     Social History: Reviewed     REVIEW OF SYSTEMS:     Frosty Pares denies fever/chills, chest pain, shortness of breath, new bowel or bladder complaints. All other review of systems was negative. Reports migraine headache exacerbation over the past 2 days. VIRTUAL NEUROLOGIC EXAMINATION:      General:      Build:Overweight  A & O x3  CN II-XII: Remains grossly intact throughout. Pupils are equal and reactive to light. EOMs are full without nystagmus. Vision fields are grossly full. Facial expression is decreased and facial sensation is normal.  Hearing is grossly intact. The tongue is midline. Motor/Sensory Exam: Grossly intact strength in the upper and lower extremities. No apparent weakness per patient. No focal sensory or numbness complaints. Coordination: No gross limb dysmetria. Lying in bed due to migraine. Gait: Denies ambulatory dysfunction. Lying in bed due to migraine.     HEENT:    Head:normocephalic and atraumatic  Pupils:normal, regular, round and equal.  Sclera: icterus absent    Lungs: Breathing:Normal expansion. Clear to auscultation. No rales, rhonchi, or wheezing. Musculoskeletal:    SLR:not done right, not done left, lying in bed on side due to migraine. No tremors. Extremities:    Tremors:None bilaterally upper and lower  Range of motion:Generally normal     Dermatology:    Skin:no skin lesions    Impression/Plan:  1. Migraine headache exacerbation x2 days. 2.  I advised her to increase topiramate to 50 mg twice daily at this time. She may increase further to 75 mg twice daily if clinically indicated. I also advised her to try a Medrol Dosepak and renewed her sumatriptan scription. Have also prescribed Compazine 5 mg tablet as needed every 8 hours for nausea associated with migraine. 3.   I advised her to follow-up by means of a virtual visit in 4 weeks. 4.   I also advised her to go to the ED if the above migraine treatment does not help. Patient advised regarding steps to help prevent the spread of COVID-19   SOURCE - https://daniel-shaun.info/. html     1-Stay home except to get medical care  2-Clean your hands often for at least 20 seconds, avoid touching: Avoid touching your eyes, nose, and mouth with unwashed hands. 3-Seek medical attention: Seek prompt medical attention if your illness is worsening (e.g., difficulty breathing). Call you doctor first.  3-Wear a facemask if you are sick   4-Cover your coughs and sneezes           I affirm this is a Patient Initiated Episode with an established Patient who has not had a related appointment within my department in the past 7 days or scheduled within the next 24 hours. Note: This visit was completed virtually using DOXY. ME    Total Time: 25 mins.     Patient location: Home  Provider location: Physician office    Cc: Referring physician    Ortiz Ram M.D.

## 2021-02-15 ENCOUNTER — VIRTUAL VISIT (OUTPATIENT)
Dept: NEUROLOGY | Age: 36
End: 2021-02-15
Payer: COMMERCIAL

## 2021-02-15 DIAGNOSIS — G43.009 MIGRAINE WITHOUT AURA AND WITHOUT STATUS MIGRAINOSUS, NOT INTRACTABLE: Primary | ICD-10-CM

## 2021-02-15 PROCEDURE — 99213 OFFICE O/P EST LOW 20 MIN: CPT | Performed by: PSYCHIATRY & NEUROLOGY

## 2021-02-15 PROCEDURE — G8419 CALC BMI OUT NRM PARAM NOF/U: HCPCS | Performed by: PSYCHIATRY & NEUROLOGY

## 2021-02-15 PROCEDURE — G8427 DOCREV CUR MEDS BY ELIG CLIN: HCPCS | Performed by: PSYCHIATRY & NEUROLOGY

## 2021-02-15 PROCEDURE — 1036F TOBACCO NON-USER: CPT | Performed by: PSYCHIATRY & NEUROLOGY

## 2021-02-15 PROCEDURE — G8484 FLU IMMUNIZE NO ADMIN: HCPCS | Performed by: PSYCHIATRY & NEUROLOGY

## 2021-02-15 RX ORDER — SUMATRIPTAN 50 MG/1
TABLET, FILM COATED ORAL
Qty: 9 TABLET | Refills: 3 | Status: SHIPPED
Start: 2021-02-15 | End: 2021-06-10 | Stop reason: SDUPTHER

## 2021-02-15 NOTE — PROGRESS NOTES
headache prophylaxis. Change in quality of symptoms:no. Medication side effects:none. Recent diagnostic testing:none. Lab data: Reviewed from 12/3/2020. Past Medical History: Reviewed    Past Surgical History: Reviewed     Home Medications: Reviewed    Current Outpatient Medications   Medication Sig Dispense Refill    SUMAtriptan (IMITREX) 50 MG tablet No more than 2 in 24 hours. 9 tablet 3    topiramate (TOPAMAX) 25 MG tablet Take 2 twice daily 90 tablet 2    prochlorperazine (COMPAZINE) 5 MG tablet Take 1 tablet by mouth every 8 hours as needed for Nausea 120 tablet 1    topiramate (TOPAMAX) 50 MG tablet Take 1 tablet by mouth 2 times daily 60 tablet 2    fluticasone (FLONASE) 50 MCG/ACT nasal spray 1 spray by Each Nostril route daily 2 Bottle 1    cetirizine (ZYRTEC) 10 MG tablet Take 10 mg by mouth nightly      methylPREDNISolone (MEDROL, MARIO,) 4 MG tablet Take by mouth as directed 1 kit 0     No current facility-administered medications for this visit. Allergies: Reviewed     Social History: Reviewed     REVIEW OF SYSTEMS:     [facility] denies fever/chills, chest pain, shortness of breath, new bowel or bladder complaints. All other review of systems was negative. Migraine headaches are    VIRTUAL NEUROLOGIC EXAMINATION:      General:      Build:Normal Weight  A & O x3; affect and mood appear appropriate. Speech/language: Clear, fluent. CN II-XII: Remains grossly intact throughout. Pupils are equal and reactive to light. EOMs are full without nystagmus. Visual to grossly full. Facial expression and sensation appear normal and symmetric. Hearing is grossly intact. The tongue is midline. Motor/Sensory Exam: Grossly intact strength in the upper and lower extremities without tremor or drift. No focal subjective sensory deficits reported. No apparent weakness per patient. No focal sensory or numbness complaints.   Coordination: No limb or gait incoordination. HEENT:    Head:normocephalic and atraumatic  Pupils:normal, regular, round and equal.  Sclera: icterus absent    Lungs:    Breathing:Normal expansion. Clear to auscultation. No rales, rhonchi, or wheezing. Musculoskeletal:    SLR:negative right, negative left, sitting     Extremities:    Tremors:None bilaterally upper and lower  Range of motion:Generally normal     Dermatology:    Skin:no skin lesions    Impression/Plan:  1. Episodic migraine headaches without a visual aura, not intractable, and with left hemicranial preference. Migraines are clinically improved on topiramate 50 mg twice daily for headache prophylaxis (or 2 at bedtime for menstrual migraine) and she continues to use as needed sumatriptan and Compazine for rescue headache medications as directed if needed. 2. Follow-up in the Neurology clinic by means of virtual video visit in 6 months preferred otherwise. Patient advised regarding steps to help prevent the spread of COVID-19   SOURCE - https://daniel-dunn.info/. html     1-Stay home except to get medical care  2-Clean your hands often for at least 20 seconds, avoid touching: Avoid touching your eyes, nose, and mouth with unwashed hands. 3-Seek medical attention: Seek prompt medical attention if your illness is worsening (e.g., difficulty breathing). Call you doctor first.  3-Wear a facemask if you are sick   4-Cover your coughs and sneezes           I affirm this is a Patient Initiated Episode with an established Patient who has not had a related appointment within my department in the past 7 days or scheduled within the next 24 hours. Note: This visit was completed virtually using DOXY. ME    Total Time: 20 mins.     Patient location: Home  Provider location: Physician office    Cc: Referring physician    Pari Nye M.D.

## 2021-03-04 ENCOUNTER — OFFICE VISIT (OUTPATIENT)
Dept: FAMILY MEDICINE CLINIC | Age: 36
End: 2021-03-04
Payer: COMMERCIAL

## 2021-03-04 VITALS
WEIGHT: 195 LBS | TEMPERATURE: 98.3 F | HEIGHT: 68 IN | DIASTOLIC BLOOD PRESSURE: 67 MMHG | BODY MASS INDEX: 29.55 KG/M2 | SYSTOLIC BLOOD PRESSURE: 103 MMHG | OXYGEN SATURATION: 99 % | HEART RATE: 86 BPM

## 2021-03-04 DIAGNOSIS — J32.9 CHRONIC SINUSITIS, UNSPECIFIED LOCATION: ICD-10-CM

## 2021-03-04 DIAGNOSIS — G43.809 OTHER MIGRAINE WITHOUT STATUS MIGRAINOSUS, NOT INTRACTABLE: Primary | ICD-10-CM

## 2021-03-04 PROCEDURE — G8419 CALC BMI OUT NRM PARAM NOF/U: HCPCS | Performed by: INTERNAL MEDICINE

## 2021-03-04 PROCEDURE — G8427 DOCREV CUR MEDS BY ELIG CLIN: HCPCS | Performed by: INTERNAL MEDICINE

## 2021-03-04 PROCEDURE — 99213 OFFICE O/P EST LOW 20 MIN: CPT | Performed by: INTERNAL MEDICINE

## 2021-03-04 PROCEDURE — 1036F TOBACCO NON-USER: CPT | Performed by: INTERNAL MEDICINE

## 2021-03-04 PROCEDURE — G8484 FLU IMMUNIZE NO ADMIN: HCPCS | Performed by: INTERNAL MEDICINE

## 2021-03-04 SDOH — ECONOMIC STABILITY: FOOD INSECURITY: WITHIN THE PAST 12 MONTHS, YOU WORRIED THAT YOUR FOOD WOULD RUN OUT BEFORE YOU GOT MONEY TO BUY MORE.: NEVER TRUE

## 2021-03-04 SDOH — ECONOMIC STABILITY: TRANSPORTATION INSECURITY
IN THE PAST 12 MONTHS, HAS THE LACK OF TRANSPORTATION KEPT YOU FROM MEDICAL APPOINTMENTS OR FROM GETTING MEDICATIONS?: NO

## 2021-03-04 SDOH — ECONOMIC STABILITY: TRANSPORTATION INSECURITY
IN THE PAST 12 MONTHS, HAS LACK OF TRANSPORTATION KEPT YOU FROM MEETINGS, WORK, OR FROM GETTING THINGS NEEDED FOR DAILY LIVING?: NO

## 2021-03-04 ASSESSMENT — ENCOUNTER SYMPTOMS
NAUSEA: 0
ABDOMINAL PAIN: 0
BLOOD IN STOOL: 0
EYE DISCHARGE: 0
EYE PAIN: 0
SINUS PAIN: 0
SHORTNESS OF BREATH: 0
SORE THROAT: 0

## 2021-03-04 ASSESSMENT — PATIENT HEALTH QUESTIONNAIRE - PHQ9
1. LITTLE INTEREST OR PLEASURE IN DOING THINGS: 0
SUM OF ALL RESPONSES TO PHQ QUESTIONS 1-9: 0
2. FEELING DOWN, DEPRESSED OR HOPELESS: 0

## 2021-03-04 NOTE — PROGRESS NOTES
Chief Complaint   Patient presents with    Migraine     follow up       HPI:  Patient is here for follow-up     Feeling better    Pt is seen by Dr Barbie Cordon - Neurology     Mai Patricio - are better  Freq is decrease as well as severity of Migraine is less  On Topamax 50 bid            Allergy and Medications are reviewed and updated. Past Medical History, Surgical History, and Family History has been reviewed and updated. Review of Systems:  Review of Systems   Constitutional: Negative for chills and fever. HENT: Negative for congestion, sinus pain and sore throat. Eyes: Negative for pain and discharge. Respiratory: Negative for shortness of breath (No new SOb). Cardiovascular: Negative for chest pain. Gastrointestinal: Negative for abdominal pain, blood in stool and nausea. Genitourinary: Negative for flank pain and frequency. Musculoskeletal: Negative for neck pain. Hematological: Does not bruise/bleed easily. Psychiatric/Behavioral: Negative for suicidal ideas. Vitals:    03/04/21 1521   BP: 103/67   Site: Left Upper Arm   Position: Sitting   Cuff Size: Medium Adult   Pulse: 86   Temp: 98.3 °F (36.8 °C)   TempSrc: Temporal   SpO2: 99%   Weight: 195 lb (88.5 kg)   Height: 5' 8\" (1.727 m)       Physical Exam  Vitals signs reviewed. Constitutional:       Appearance: She is well-developed. HENT:      Head: Normocephalic and atraumatic. Mouth/Throat:      Pharynx: No oropharyngeal exudate. Eyes:      Conjunctiva/sclera: Conjunctivae normal.      Pupils: Pupils are equal, round, and reactive to light. Neck:      Vascular: No JVD. Cardiovascular:      Rate and Rhythm: Normal rate and regular rhythm. Pulmonary:      Effort: Pulmonary effort is normal.      Breath sounds: Normal breath sounds. No rales. Abdominal:      General: Bowel sounds are normal.      Palpations: Abdomen is soft. Musculoskeletal: Normal range of motion.    Lymphadenopathy: Cervical: No cervical adenopathy. Skin:     General: Skin is warm and dry. Neurological:      Mental Status: She is alert and oriented to person, place, and time. Psychiatric:         Behavior: Behavior normal.          Labs :    Lab Results   Component Value Date    WBC 6.2 2020    HGB 12.7 2020    HCT 38.5 2020     2020    HDL 43 2020    ALT 11 2020    AST 11 2020     2020    K 4.1 2020     2020    CREATININE 0.9 2020    BUN 11 2020    CO2 22 2020    TSH 0.892 2020    GLUF 92 2020    LABA1C 5.0 2020     Lab Results   Component Value Date    COLORU Yellow 2020    NITRU Negative 2020    GLUCOSEU Negative 2020    KETUA Negative 2020    UROBILINOGEN 0.2 2020    BILIRUBINUR Negative 2020             ASSESSMENT     Patient Active Problem List    Diagnosis Date Noted    History of migraine 2020    Ethmoid sinusitis 2020    Migraine without aura and without status migrainosus, not intractable 2020    Antepartum genitourinary tract infection 2013    Previous  delivery, antepartum condition or complication         Diagnosis:     ICD-10-CM    1. Other migraine without status migrainosus, not intractable  G43.809    2. Chronic sinusitis, unspecified location  J32.9        PLAN:      Pt is feeling better    Migraine is better controlled    Tolerating Topamax    Neurology consult- reviewed    On Flonase and Zyrtec as needed for allergies    Can use Saline spray as needed for dry Mucus membrane    Pt is stable on current medical treatment. Continue current treatment plan    Side effects/Adverse effects/Precautions are reviewed with patient.      Low salt, Low Carb diet an low fat diet  Continue medications as advised and take them regularly  Regular exercises as advised Discussed natural and expected course of this diagnosis and need to alert me if symptoms do not follow expected course, or if any worse. Smoking cessation if applicable, discussed with patient. Patient Instructions   The medication list included in this document is our record of what you are currently taking, including any changes that were made at today's visit.  If you find any differences when compared to your medications at home, or have any questions that were not answered at your visit, please contact the office. Return in about 3 months (around 6/4/2021).

## 2021-03-17 ENCOUNTER — OFFICE VISIT (OUTPATIENT)
Dept: FAMILY MEDICINE CLINIC | Age: 36
End: 2021-03-17
Payer: COMMERCIAL

## 2021-03-17 VITALS
TEMPERATURE: 98.6 F | SYSTOLIC BLOOD PRESSURE: 120 MMHG | HEIGHT: 68 IN | OXYGEN SATURATION: 98 % | DIASTOLIC BLOOD PRESSURE: 75 MMHG | BODY MASS INDEX: 29.55 KG/M2 | WEIGHT: 195 LBS | HEART RATE: 86 BPM | RESPIRATION RATE: 18 BRPM

## 2021-03-17 DIAGNOSIS — G43.809 OTHER MIGRAINE WITHOUT STATUS MIGRAINOSUS, NOT INTRACTABLE: ICD-10-CM

## 2021-03-17 DIAGNOSIS — R07.9 CHEST PAIN, UNSPECIFIED TYPE: Primary | ICD-10-CM

## 2021-03-17 PROCEDURE — G8419 CALC BMI OUT NRM PARAM NOF/U: HCPCS | Performed by: INTERNAL MEDICINE

## 2021-03-17 PROCEDURE — 99213 OFFICE O/P EST LOW 20 MIN: CPT | Performed by: INTERNAL MEDICINE

## 2021-03-17 PROCEDURE — G8484 FLU IMMUNIZE NO ADMIN: HCPCS | Performed by: INTERNAL MEDICINE

## 2021-03-17 PROCEDURE — 93000 ELECTROCARDIOGRAM COMPLETE: CPT | Performed by: INTERNAL MEDICINE

## 2021-03-17 PROCEDURE — 1036F TOBACCO NON-USER: CPT | Performed by: INTERNAL MEDICINE

## 2021-03-17 PROCEDURE — G8427 DOCREV CUR MEDS BY ELIG CLIN: HCPCS | Performed by: INTERNAL MEDICINE

## 2021-03-17 ASSESSMENT — ENCOUNTER SYMPTOMS
SINUS PAIN: 0
SORE THROAT: 0
SHORTNESS OF BREATH: 0
ABDOMINAL PAIN: 0
NAUSEA: 0
BLOOD IN STOOL: 0
EYE PAIN: 0
EYE DISCHARGE: 0

## 2021-03-17 NOTE — PROGRESS NOTES
me if symptoms do not follow expected course, or if any worse. Smoking cessation if applicable, discussed with patient. Adv for regular exercises        Patient Instructions   The medication list included in this document is our record of what you are currently taking, including any changes that were made at today's visit.  If you find any differences when compared to your medications at home, or have any questions that were not answered at your visit, please contact the office. Return in about 6 weeks (around 4/28/2021).

## 2021-03-22 DIAGNOSIS — R07.9 CHEST PAIN, UNSPECIFIED TYPE: Primary | ICD-10-CM

## 2021-03-24 ENCOUNTER — TELEPHONE (OUTPATIENT)
Dept: CARDIOLOGY CLINIC | Age: 36
End: 2021-03-24

## 2021-04-16 ENCOUNTER — OFFICE VISIT (OUTPATIENT)
Dept: CARDIOLOGY CLINIC | Age: 36
End: 2021-04-16
Payer: COMMERCIAL

## 2021-04-16 VITALS
HEART RATE: 73 BPM | DIASTOLIC BLOOD PRESSURE: 80 MMHG | BODY MASS INDEX: 30.01 KG/M2 | HEIGHT: 68 IN | SYSTOLIC BLOOD PRESSURE: 124 MMHG | WEIGHT: 198 LBS | RESPIRATION RATE: 16 BRPM

## 2021-04-16 DIAGNOSIS — R07.9 CHEST PAIN, UNSPECIFIED TYPE: Primary | ICD-10-CM

## 2021-04-16 PROCEDURE — G8427 DOCREV CUR MEDS BY ELIG CLIN: HCPCS | Performed by: INTERNAL MEDICINE

## 2021-04-16 PROCEDURE — 93000 ELECTROCARDIOGRAM COMPLETE: CPT | Performed by: INTERNAL MEDICINE

## 2021-04-16 PROCEDURE — 99243 OFF/OP CNSLTJ NEW/EST LOW 30: CPT | Performed by: INTERNAL MEDICINE

## 2021-04-16 PROCEDURE — G8417 CALC BMI ABV UP PARAM F/U: HCPCS | Performed by: INTERNAL MEDICINE

## 2021-04-16 NOTE — PROGRESS NOTES
[id number] [facility] Cardiology consult  Dr. Silas Collier      Reason for Consult: Chest Pain  Referring Physician: Landon Rutledge MD     CHIEF COMPLAINT:   Chief Complaint   Patient presents with    New Patient    Chest Pain     x 6 months, comes and goes at rest lasting 1 to 2 minutes, no pain for 2 weeks. HISTORY OF PRESENT ILLNESS:   37year old female with no significant past medical history is here for evaluation of chest pain. Patient is complaining of chest pain, left-sided, comes and goes, does not last long, at rest sometimes with exertion, no associated symptoms except for occasional palpitations, no pedal edema, no pain, no orthopnea, no syncope, no presyncopal episodes. Past Medical History:   Diagnosis Date    ADHD (attention deficit hyperactivity disorder)     Complication of anesthesia     states spinal did not last entire case    Ethmoid sinusitis 2020    History of migraine 2020    Migraine without aura and without status migrainosus, not intractable 2020    Postpartum depression     with first child    Thrombophilia ([facility] [address]          Past Surgical History:   Procedure Laterality Date    ABDOMEN SURGERY       section x 2     SECTION      CHOLECYSTECTOMY           Current Outpatient Medications   Medication Sig Dispense Refill    SUMAtriptan (IMITREX) 50 MG tablet No more than 2 in 24 hours. (Patient taking differently: Take 50 mg by mouth as needed No more than 2 in 24 hours.) 9 tablet 3    prochlorperazine (COMPAZINE) 5 MG tablet Take 1 tablet by mouth every 8 hours as needed for Nausea 120 tablet 1    topiramate (TOPAMAX) 50 MG tablet Take 1 tablet by mouth 2 times daily 60 tablet 2    fluticasone (FLONASE) 50 MCG/ACT nasal spray 1 spray by Each Nostril route daily 2 Bottle 1    cetirizine (ZYRTEC) 10 MG tablet Take 10 mg by mouth nightly       No current facility-administered medications for this visit.           Allergies as of 2021 - Review Complete 04/16/2021   Allergen Reaction Noted    Vyvanse [lisdexamfetamine] Anxiety 03/04/2021       Social History     Socioeconomic History    Marital status:      Spouse name: Not on file    Number of children: Not on file    Years of education: Not on file    Highest education level: Not on file   Occupational History    Not on file   Social Needs    Financial resource strain: Not on file    Food insecurity     Worry: Never true     Inability: Never true   [employer] needs     Medical: No     Non-medical: No   Tobacco Use    Smoking status: Former Smoker     Types: Cigarettes     Quit date: 12/18/2017     Years since quitting: 3.3    Smokeless tobacco: Never Used   Substance and Sexual Activity    Alcohol use: No    Drug use: No    Sexual activity: Yes     Partners: Male   Lifestyle    Physical activity     Days per week: Not on file     Minutes per session: Not on file    Stress: Not on file   Relationships    Social connections     Talks on phone: Not on file     Gets together: Not on file     Attends [employer] service: Not on file     Active member of club or organization: Not on file     Attends meetings of clubs or organizations: Not on file     Relationship status: Not on file    Intimate partner violence     Fear of current or ex partner: Not on file     Emotionally abused: Not on file     Physically abused: Not on file     Forced sexual activity: Not on file   Other Topics Concern    Not on file   Social History Narrative    Not on file       Family History   Problem Relation Age of Onset    High Blood Pressure Mother     High Cholesterol Mother     High Cholesterol Father     High Blood Pressure Father     High Cholesterol Brother     High Blood Pressure Brother     Diabetes Maternal Grandmother     High Blood Pressure Maternal Grandmother     High Cholesterol Maternal Grandmother     Heart Disease Maternal Grandfather     High Cholesterol Maternal Grandfather  High Blood Pressure Maternal Grandfather     Cancer Paternal Grandfather        REVIEW OF SYSTEMS:     CONSTITUTIONAL:  negative for  fevers, chills, sweats and fatigue  EYES:  negative for  double vision, blurred vision and blind spots  HEENT:  negative for  tinnitus, earaches, nasal congestion and epistaxis  RESPIRATORY:  negative for  dry cough, cough with sputum, dyspnea, wheezing and hemoptysis  CARDIOVASCULAR: as per HPI  GASTROINTESTINAL:  negative for nausea, vomiting, diarrhea, constipation, pruritus and jaundice  GENITOURINARY:  negative for frequency, dysuria, nocturia, urinary incontinence and hesitancy  HEMATOLOGIC/LYMPHATIC:  negative for easy bruising, bleeding, lymphadenopathy and petechiae  ALLERGIC/IMMUNOLOGIC:  negative for urticaria, hay fever and angioedema  ENDOCRINE:  negative for heat intolerance, cold intolerance, tremor, hair loss and diabetic symptoms including neither polyuria nor polydipsia nor blurred vision  MUSCULOSKELETAL:  negative for  myalgias, arthralgias, joint swelling, stiff joints and decreased range of motion  NEUROLOGICAL:  negative for memory problems, speech problems, visual disturbance, dysphagia, weakness and numbness      PHYSICAL EXAM:   CONSTITUTIONAL:  awake, alert, cooperative, no apparent distress, and appears stated age  EYES:  lids and lashes normal and pupils equal, round and reactive to light, anicteric sclerae  HEAD:  normocepalic, without obvious abnormality, atraumatic, pink, moist mucous membranes.   NECK:  Supple, symmetrical, trachea midline, no adenopathy, thyroid symmetric, not enlarged and no tenderness, skin normal  HEMATOLOGIC/LYMPHATICS:  no cervical lymphadenopathy and no supraclavicular lymphadenopathy  LUNGS:  No increased work of breathing, good air exchange, clear to auscultation bilaterally, no crackles or wheezing  CARDIOVASCULAR:  Normal apical impulse, regular rate and rhythm, normal S1 and S2, no S3 or S4, and no murmur noted and no JVD, no carotid bruit, no pedal edema, good carotid upstroke bilaterally. ABDOMEN:  Soft, nontender, no masses, no hepatomegaly or splenomegaly, BS+  CHEST: nontender to palpation, expands symmetrically  MUSCULOSKELETAL:  No clubbing no cyanosis. there is no redness, warmth, or swelling of the joints  full range of motion noted  NEUROLOGIC:  Alert, awake,oriented x3, no focal neurologic deficit was appreciated  SKIN:  no bruising or bleeding, normal skin color, texture, turgor and no redness, warmth, or swelling        /80   Pulse 73   Resp 16   Ht 5' 8\" (1.727 m)   Wt 198 lb (89.8 kg)   BMI 30.11 kg/m²     DATA:   I personally reviewed the visit EKG with the following interpretation:Sinus rhythm, right bundle branch block pattern, normal axis    EKG 3/17/21 Sinus  Rhythm   -RSR(V1) -nondiagnostic.      ECHO:   Stress Test:   Angiography:   Cardiology Labs: BMP:    Lab Results   Component Value Date     12/03/2020    K 4.1 12/03/2020     12/03/2020    CO2 22 12/03/2020    BUN 11 12/03/2020    CREATININE 0.9 12/03/2020     CMP:    Lab Results   Component Value Date     12/03/2020    K 4.1 12/03/2020     12/03/2020    CO2 22 12/03/2020    BUN 11 12/03/2020    CREATININE 0.9 12/03/2020    PROT 7.2 12/03/2020     CBC:    Lab Results   Component Value Date    WBC 6.2 12/03/2020    RBC 4.27 12/03/2020    HGB 12.7 12/03/2020    HCT 38.5 12/03/2020    MCV 90.2 12/03/2020    RDW 13.0 12/03/2020     12/03/2020     PT/INR:  No results found for: PTINR  PT/INR Warfarin:  No components found for: PTPATWAR, PTINRWAR  PTT:  No results found for: APTT  PTT Heparin:  No components found for: APTTHEP  Magnesium:  No results found for: MG  TSH:    Lab Results   Component Value Date    TSH 0.892 05/14/2020     TROPONIN:  No components found for: TROP  BNP:  No results found for: BNP  FASTING LIPID PANEL:    Lab Results   Component Value Date    HDL 43 05/14/2020     No orders to display     I have personally reviewed the laboratory, cardiac diagnostic and radiographic testing as outlined above:      IMPRESSION:  1. Chest pain: Atypical, family history for CAD, will schedule for regular exercise stress test for further evaluation. RECOMMENDATIONS:   1. Regular exercise stress test  2. Continue current treatment  3. Follow-up with Dr. Mina Nguyen as scheduled  4. Follow-up with Dr. Kelsey Jimenez after her test    I have reviewed my findings and recommendations with patient    Thank you for the consult  Electronically signed by Breonna Vernon MD on 4/16/2021 at 6:39 PM      NOTE: This report was transcribed using voice recognition software.  Every effort was made to ensure accuracy; however, inadvertent computerized transcription errors may be present

## 2021-04-19 RX ORDER — TOPIRAMATE 50 MG/1
TABLET, FILM COATED ORAL
Qty: 60 TABLET | Refills: 2 | Status: SHIPPED
Start: 2021-04-19 | End: 2021-06-10 | Stop reason: SDUPTHER

## 2021-04-30 ENCOUNTER — TELEPHONE (OUTPATIENT)
Dept: CARDIOLOGY | Age: 36
End: 2021-04-30

## 2021-04-30 NOTE — TELEPHONE ENCOUNTER
4-30-21 @ 1435. Called and left a message with the patient with a reminder regarding her stress test on 4-5-21 @ 1300. Instructions reviewed. Asked the patient to call and reschedule should she develop any signs/symptoms of the Covid.   Dylon Altman RN

## 2021-05-04 ENCOUNTER — HOSPITAL ENCOUNTER (OUTPATIENT)
Dept: CARDIOLOGY | Age: 36
Discharge: HOME | End: 2021-05-04
Payer: COMMERCIAL

## 2021-05-04 VITALS
RESPIRATION RATE: 18 BRPM | DIASTOLIC BLOOD PRESSURE: 68 MMHG | SYSTOLIC BLOOD PRESSURE: 103 MMHG | BODY MASS INDEX: 30.01 KG/M2 | OXYGEN SATURATION: 99 % | HEIGHT: 68 IN | WEIGHT: 198 LBS | HEART RATE: 61 BPM

## 2021-05-04 DIAGNOSIS — R07.9 CHEST PAIN, UNSPECIFIED TYPE: ICD-10-CM

## 2021-05-04 PROCEDURE — 93017 CV STRESS TEST TRACING ONLY: CPT

## 2021-05-04 NOTE — PROCEDURES
[address] [facility]. [address], [facility]  [phone]    Exercise Stress Study       Name: [address] Account Number:  [de-identified]      :  1985          Sex: female         Date of Study:  2021    Height: 5' 8\" (172.7 cm)          Weight: 198 lb (89.8 kg)    Ordering Provider: Toni Scott          PCP: Freda House MD    Cardiologist: Toni Scott              Interpreting Physician: Toni Scott    Indication:   Detecting the presence and location of coronary artery disease    Clinical History:   Patient has no known history of coronary artery disease. Resting ECG:    MD int 136m sec, QRS int 88m sec, QT int 398m sec; HR 61 bpm  Normal sinus rhythm    Exercise: The patient exercised using a Paxton protocol, completing 6:01 minutes and reaching an estimated work load of 9.21 metabolic equivalents (METS). Resting HR was 61. Peak exercise heart rate was 162 ( 86% of maximum predicted heart rate for age). Baseline /68. Peak exercise /64. The blood pressure response to exercise was normal      Exercise was terminated due to heart rate attained. The patient experienced no chest pain with exercise. Pulse oximetry was used to monitor oxygen saturation during the stress test.  The study was performed on Room Air. The resting pulse oximeter was 99%. The post stress O2 saturation seen during exercise was 99 %. Exercise ECG:   The patient demonstrated isolated PVC during exercise. With exercise, There was nonspecific upsloping ST depression. Impression:    1. Exercise EKG was normal.  2. The patient experienced no chest pain with exercise. 3. Dorantes treadmill score was 6 implying low risk of acute ischemic events. 4. Exercise capacity was below average. Thank you for sending your patient to this [employer].     Electronically signed by Facundo Cha MD on 5/7/21 at 4:29 PM EDT

## 2021-05-10 ENCOUNTER — TELEPHONE (OUTPATIENT)
Dept: CARDIOLOGY CLINIC | Age: 36
End: 2021-05-10

## 2021-06-07 ENCOUNTER — OFFICE VISIT (OUTPATIENT)
Dept: FAMILY MEDICINE CLINIC | Age: 36
End: 2021-06-07
Payer: COMMERCIAL

## 2021-06-07 VITALS
WEIGHT: 201.9 LBS | RESPIRATION RATE: 16 BRPM | OXYGEN SATURATION: 100 % | BODY MASS INDEX: 30.6 KG/M2 | SYSTOLIC BLOOD PRESSURE: 118 MMHG | HEIGHT: 68 IN | TEMPERATURE: 96.7 F | HEART RATE: 76 BPM | DIASTOLIC BLOOD PRESSURE: 78 MMHG

## 2021-06-07 DIAGNOSIS — Z00.00 ANNUAL PHYSICAL EXAM: Primary | ICD-10-CM

## 2021-06-07 DIAGNOSIS — Z11.59 NEED FOR HEPATITIS C SCREENING TEST: ICD-10-CM

## 2021-06-07 PROCEDURE — 99395 PREV VISIT EST AGE 18-39: CPT | Performed by: INTERNAL MEDICINE

## 2021-06-07 RX ORDER — CEPHALEXIN 500 MG/1
CAPSULE ORAL
COMMUNITY
Start: 2021-05-24 | End: 2021-06-07

## 2021-06-07 ASSESSMENT — ENCOUNTER SYMPTOMS
EYE PAIN: 0
NAUSEA: 0
ABDOMINAL PAIN: 0
SINUS PAIN: 0
SORE THROAT: 0
BLOOD IN STOOL: 0
EYE DISCHARGE: 0
SHORTNESS OF BREATH: 0

## 2021-06-07 NOTE — PROGRESS NOTES
Chief Complaint   Patient presents with    Chest Pain     Patient states she is better Dr Yulissa Rolon did a stress test and everything came out good patient states     Other     PAP is scheduled with Dr Jason Davis       HPI:  Patient is here for follow-up    Allergy and Medications are reviewed and updated. Past Medical History, Surgical History, and Family History has been reviewed and updated. Review of Systems:  Review of Systems   Constitutional: Negative for chills and fever. HENT: Negative for congestion, sinus pain and sore throat. Eyes: Negative for pain and discharge. Respiratory: Negative for shortness of breath (No new SOb). Cardiovascular: Negative for chest pain. Gastrointestinal: Negative for abdominal pain, blood in stool and nausea. Genitourinary: Negative for flank pain and frequency. Musculoskeletal: Negative for neck pain. Hematological: Does not bruise/bleed easily. Psychiatric/Behavioral: Negative for suicidal ideas. Vitals:    06/07/21 1434   BP: 118/78   Pulse: 76   Resp: 16   Temp: 96.7 °F (35.9 °C)   TempSrc: Temporal   SpO2: 100%   Weight: 201 lb 14.4 oz (91.6 kg)   Height: 5' 8\" (1.727 m)       Physical Exam  Vitals reviewed. Constitutional:       Appearance: She is well-developed. HENT:      Head: Normocephalic and atraumatic. Mouth/Throat:      Pharynx: No oropharyngeal exudate. Eyes:      Conjunctiva/sclera: Conjunctivae normal.      Pupils: Pupils are equal, round, and reactive to light. Neck:      Vascular: No JVD. Cardiovascular:      Rate and Rhythm: Normal rate and regular rhythm. Pulmonary:      Effort: Pulmonary effort is normal.      Breath sounds: Normal breath sounds. No rales. Abdominal:      General: Bowel sounds are normal.      Palpations: Abdomen is soft. Musculoskeletal:         General: Normal range of motion. Lymphadenopathy:      Cervical: No cervical adenopathy. Skin:     General: Skin is warm and dry.    Neurological: Mental Status: She is alert and oriented to person, place, and time. Psychiatric:         Behavior: Behavior normal.          Labs :    Lab Results   Component Value Date    WBC 6.2 2020    HGB 12.7 2020    HCT 38.5 2020     2020    HDL 43 2020    ALT 11 2020    AST 11 2020     2020    K 4.1 2020     2020    CREATININE 0.9 2020    BUN 11 2020    CO2 22 2020    TSH 0.892 2020    GLUF 92 2020    LABA1C 5.0 2020     Lab Results   Component Value Date    COLORU Yellow 2020    NITRU Negative 2020    GLUCOSEU Negative 2020    KETUA Negative 2020    UROBILINOGEN 0.2 2020    BILIRUBINUR Negative 2020             ASSESSMENT     Patient Active Problem List    Diagnosis Date Noted    History of migraine 2020    Ethmoid sinusitis 2020    Migraine without aura and without status migrainosus, not intractable 2020    Antepartum genitourinary tract infection 2013    Previous  delivery, antepartum condition or complication         Diagnosis:     ICD-10-CM    1. Annual physical exam  Z00.00 CBC Auto Differential     Comprehensive Metabolic Panel, Fasting     Lipid, Fasting     TSH without Reflex     Urinalysis with Microscopic   2. Need for hepatitis C screening test  Z11.59 Hepatitis C Antibody       PLAN:      Cardiology labs reviewed with patient    Stress test - Neg    Adv for yrly labs    Pt has concern about weight    Discussed with patient about Caloric restricted diet, Adam OTC, Noom    Advise to have ( Fasting) lab test prior to next visit. Pt is stable on current medical treatment. Continue current treatment plan    Side effects/Adverse effects/Precautions are reviewed with patient.      Low salt, Low Carb diet an low fat diet  Continue medications as advised and take them regularly  Regular exercises as advised Discussed natural and expected course of this diagnosis and need to alert me if symptoms do not follow expected course, or if any worse. Smoking cessation if applicable, discussed with patient. Patient Instructions   The medication list included in this document is our record of what you are currently taking, including any changes that were made at today's visit.  If you find any differences when compared to your medications at home, or have any questions that were not answered at your visit, please contact the office. Advise to have ( Fasting) lab test prior to next visit. Return in about 2 months (around 8/7/2021).

## 2021-06-09 ENCOUNTER — HOSPITAL ENCOUNTER (EMERGENCY)
Age: 36
Discharge: HOME | End: 2021-06-09
Attending: EMERGENCY MEDICINE
Payer: COMMERCIAL

## 2021-06-09 VITALS
HEART RATE: 60 BPM | HEIGHT: 68 IN | SYSTOLIC BLOOD PRESSURE: 107 MMHG | TEMPERATURE: 97.5 F | WEIGHT: 201 LBS | RESPIRATION RATE: 16 BRPM | OXYGEN SATURATION: 97 % | BODY MASS INDEX: 30.46 KG/M2 | DIASTOLIC BLOOD PRESSURE: 64 MMHG

## 2021-06-09 DIAGNOSIS — R51.9 ACUTE NONINTRACTABLE HEADACHE, UNSPECIFIED HEADACHE TYPE: Primary | ICD-10-CM

## 2021-06-09 LAB
HCG, URINE, POC: NEGATIVE
Lab: NORMAL
NEGATIVE QC PASS/FAIL: NORMAL
POSITIVE QC PASS/FAIL: NORMAL

## 2021-06-09 PROCEDURE — 96375 TX/PRO/DX INJ NEW DRUG ADDON: CPT

## 2021-06-09 PROCEDURE — 96374 THER/PROPH/DIAG INJ IV PUSH: CPT

## 2021-06-09 PROCEDURE — 99284 EMERGENCY DEPT VISIT MOD MDM: CPT

## 2021-06-09 PROCEDURE — 6360000002 HC RX W HCPCS: Performed by: EMERGENCY MEDICINE

## 2021-06-09 PROCEDURE — 96372 THER/PROPH/DIAG INJ SC/IM: CPT

## 2021-06-09 PROCEDURE — 2580000003 HC RX 258: Performed by: EMERGENCY MEDICINE

## 2021-06-09 PROCEDURE — 6370000000 HC RX 637 (ALT 250 FOR IP): Performed by: EMERGENCY MEDICINE

## 2021-06-09 RX ORDER — DIPHENHYDRAMINE HYDROCHLORIDE 50 MG/ML
25 INJECTION INTRAMUSCULAR; INTRAVENOUS ONCE
Status: COMPLETED | OUTPATIENT
Start: 2021-06-09 | End: 2021-06-09

## 2021-06-09 RX ORDER — KETOROLAC TROMETHAMINE 30 MG/ML
30 INJECTION, SOLUTION INTRAMUSCULAR; INTRAVENOUS ONCE
Status: COMPLETED | OUTPATIENT
Start: 2021-06-09 | End: 2021-06-09

## 2021-06-09 RX ORDER — MORPHINE SULFATE 5 MG/ML
5 INJECTION, SOLUTION INTRAMUSCULAR; INTRAVENOUS ONCE
Status: COMPLETED | OUTPATIENT
Start: 2021-06-09 | End: 2021-06-09

## 2021-06-09 RX ORDER — SUMATRIPTAN 6 MG/.5ML
6 INJECTION, SOLUTION SUBCUTANEOUS ONCE
Status: COMPLETED | OUTPATIENT
Start: 2021-06-09 | End: 2021-06-09

## 2021-06-09 RX ORDER — METOCLOPRAMIDE HYDROCHLORIDE 5 MG/ML
10 INJECTION INTRAMUSCULAR; INTRAVENOUS ONCE
Status: COMPLETED | OUTPATIENT
Start: 2021-06-09 | End: 2021-06-09

## 2021-06-09 RX ORDER — 0.9 % SODIUM CHLORIDE 0.9 %
1000 INTRAVENOUS SOLUTION INTRAVENOUS ONCE
Status: COMPLETED | OUTPATIENT
Start: 2021-06-09 | End: 2021-06-09

## 2021-06-09 RX ADMIN — SODIUM CHLORIDE 1000 ML: 9 INJECTION, SOLUTION INTRAVENOUS at 05:31

## 2021-06-09 RX ADMIN — MORPHINE SULFATE 5 MG: 5 INJECTION, SOLUTION INTRAMUSCULAR; INTRAVENOUS at 07:12

## 2021-06-09 RX ADMIN — METOCLOPRAMIDE HYDROCHLORIDE 10 MG: 5 INJECTION INTRAMUSCULAR; INTRAVENOUS at 05:35

## 2021-06-09 RX ADMIN — DIPHENHYDRAMINE HYDROCHLORIDE 25 MG: 50 INJECTION INTRAMUSCULAR; INTRAVENOUS at 05:35

## 2021-06-09 RX ADMIN — KETOROLAC TROMETHAMINE 30 MG: 30 INJECTION, SOLUTION INTRAMUSCULAR; INTRAVENOUS at 05:32

## 2021-06-09 RX ADMIN — SUMATRIPTAN 6 MG: 6 INJECTION, SOLUTION SUBCUTANEOUS at 06:45

## 2021-06-09 ASSESSMENT — ENCOUNTER SYMPTOMS
SORE THROAT: 0
SHORTNESS OF BREATH: 0
COUGH: 0
EYE PAIN: 0
EYE REDNESS: 0
PHOTOPHOBIA: 1
WHEEZING: 0
VOMITING: 0
SINUS PRESSURE: 0
BACK PAIN: 0
NAUSEA: 1
DIARRHEA: 0
EYE DISCHARGE: 0
ABDOMINAL DISTENTION: 0

## 2021-06-09 ASSESSMENT — PAIN SCALES - GENERAL
PAINLEVEL_OUTOF10: 0
PAINLEVEL_OUTOF10: 3
PAINLEVEL_OUTOF10: 6
PAINLEVEL_OUTOF10: 10

## 2021-06-09 NOTE — ED PROVIDER NOTES
Tenderness: There is no abdominal tenderness. There is no guarding. Musculoskeletal:         General: Normal range of motion. Cervical back: Normal range of motion and neck supple. Skin:     General: Skin is warm and dry. Findings: No rash. Neurological:      Mental Status: She is alert and oriented to person, place, and time. Procedures     MDM           Time: [id number]. Re-evaluation. Patients symptoms are improving  Repeat physical examination is improved  Patient's headache is improving.         --------------------------------------------- PAST HISTORY ---------------------------------------------  Past Medical History:  has a past medical history of ADHD (attention deficit hyperactivity disorder), Complication of anesthesia, Ethmoid sinusitis, History of migraine, Migraine without aura and without status migrainosus, not intractable, Postpartum depression, and Thrombophilia ([facility] [address] Past Surgical History:  has a past surgical history that includes  section; Cholecystectomy; and Abdomen surgery. Social History:  reports that she quit smoking about 3 years ago. Her smoking use included cigarettes. She has never used smokeless tobacco. She reports that she does not drink alcohol and does not use drugs. Family History: family history includes Cancer in her paternal grandfather; Diabetes in her maternal grandmother; Heart Disease in her maternal grandfather; High Blood Pressure in her brother, father, maternal grandfather, maternal grandmother, and mother; High Cholesterol in her brother, father, maternal grandfather, maternal grandmother, and mother. The patients home medications have been reviewed.     Allergies: Vyvanse [lisdexamfetamine]    -------------------------------------------------- RESULTS -------------------------------------------------  Labs:  Results for orders placed or performed during the hospital encounter of 21   POC Pregnancy Urine Qual   Result

## 2021-06-09 NOTE — ED NOTES
The patient says she feels a little nauseated, but pain is gone.      Bulmaro Bearden RN  06/09/21 9706

## 2021-06-10 RX ORDER — SUMATRIPTAN 50 MG/1
TABLET, FILM COATED ORAL
Qty: 9 TABLET | Refills: 3 | Status: SHIPPED
Start: 2021-06-10 | End: 2021-08-16

## 2021-06-10 RX ORDER — TOPIRAMATE 50 MG/1
TABLET, FILM COATED ORAL
Qty: 60 TABLET | Refills: 5 | Status: SHIPPED
Start: 2021-06-10 | End: 2021-07-19

## 2021-06-24 ENCOUNTER — OFFICE VISIT (OUTPATIENT)
Dept: FAMILY MEDICINE CLINIC | Age: 36
End: 2021-06-24
Payer: COMMERCIAL

## 2021-06-24 VITALS
DIASTOLIC BLOOD PRESSURE: 58 MMHG | OXYGEN SATURATION: 99 % | RESPIRATION RATE: 17 BRPM | TEMPERATURE: 98.1 F | WEIGHT: 199 LBS | SYSTOLIC BLOOD PRESSURE: 96 MMHG | BODY MASS INDEX: 30.16 KG/M2 | HEART RATE: 68 BPM | HEIGHT: 68 IN

## 2021-06-24 DIAGNOSIS — B96.89 ACUTE BACTERIAL SINUSITIS: Primary | ICD-10-CM

## 2021-06-24 DIAGNOSIS — J01.90 ACUTE BACTERIAL SINUSITIS: Primary | ICD-10-CM

## 2021-06-24 DIAGNOSIS — G43.909 MIGRAINE WITHOUT STATUS MIGRAINOSUS, NOT INTRACTABLE, UNSPECIFIED MIGRAINE TYPE: ICD-10-CM

## 2021-06-24 DIAGNOSIS — J32.9 CHRONIC SINUSITIS, UNSPECIFIED LOCATION: ICD-10-CM

## 2021-06-24 PROCEDURE — G8427 DOCREV CUR MEDS BY ELIG CLIN: HCPCS | Performed by: PHYSICIAN ASSISTANT

## 2021-06-24 PROCEDURE — G8417 CALC BMI ABV UP PARAM F/U: HCPCS | Performed by: PHYSICIAN ASSISTANT

## 2021-06-24 PROCEDURE — 99213 OFFICE O/P EST LOW 20 MIN: CPT | Performed by: PHYSICIAN ASSISTANT

## 2021-06-24 PROCEDURE — 1036F TOBACCO NON-USER: CPT | Performed by: PHYSICIAN ASSISTANT

## 2021-06-24 RX ORDER — AMOXICILLIN AND CLAVULANATE POTASSIUM 875; 125 MG/1; MG/1
1 TABLET, FILM COATED ORAL 2 TIMES DAILY
Qty: 20 TABLET | Refills: 0 | Status: SHIPPED | OUTPATIENT
Start: 2021-06-24 | End: 2021-07-04

## 2021-06-24 NOTE — PATIENT INSTRUCTIONS
Patient Education        Saline Nasal Washes: Care Instructions  Your Care Instructions     Saline nasal washes help keep the nasal passages open by washing out thick or dried mucus. This simple remedy can help relieve symptoms of allergies, sinusitis, and colds. It also can make the nose feel more comfortable by keeping the mucous membranes moist. You may notice a little burning sensation in your nose the first few times you use the solution, but this usually gets better in a few days. Follow-up care is a key part of your treatment and safety. Be sure to make and go to all appointments, and call your doctor if you are having problems. It's also a good idea to know your test results and keep a list of the medicines you take. How can you care for yourself at home? · You can buy premixed saline solution in a squeeze bottle or other sinus rinse products at a drugstore. Read and follow the instructions on the label. · You also can make your own saline solution by adding 1 teaspoon of salt and 1 teaspoon of baking soda to 2 cups of distilled water. · If you use a homemade solution, pour a small amount into a clean bowl. Using a rubber bulb syringe, squeeze the syringe and place the tip in the salt water. Pull a small amount of the salt water into the syringe by relaxing your hand. · Sit down with your head tilted slightly back. Do not lie down. Put the tip of the bulb syringe or the squeeze bottle a little way into one of your nostrils. Gently drip or squirt a few drops into the nostril. Repeat with the other nostril. Some sneezing and gagging are normal at first.  · Gently blow your nose. · Wipe the syringe or bottle tip clean after each use. · Repeat this 2 or 3 times a day. · Use nasal washes gently if you have nosebleeds often. When should you call for help?   Watch closely for changes in your health, and be sure to contact your doctor if:    · You often get nosebleeds.     · You have problems doing the nasal washes. Where can you learn more? Go to https://chpepiceweb.[employer]. org and sign in to your [employer] account. Enter [phone] [id number] [phone] in the [facility] box to learn more about \"Saline Nasal Washes: Care Instructions. \"     If you do not have an account, please click on the \"Sign Up Now\" link. Current as of: December 2, 2020               Content Version: 12.9  © 2006-2021 [employer]. Care instructions adapted under license by [facility] ([facility]). If you have questions about a medical condition or this instruction, always ask your healthcare professional. [place] [address] any warranty or liability for your use of this information. Patient Education        Sinusitis: Care Instructions  Your Care Instructions     Sinusitis is an infection of the lining of the sinus cavities in your head. Sinusitis often follows a cold. It causes pain and pressure in your head and face. In most cases, sinusitis gets better on its own in 1 to 2 weeks. But some mild symptoms may last for several weeks. Sometimes antibiotics are needed. Follow-up care is a key part of your treatment and safety. Be sure to make and go to all appointments, and call your doctor if you are having problems. It's also a good idea to know your test results and keep a list of the medicines you take. How can you care for yourself at home? · Take an over-the-counter pain medicine, such as acetaminophen (Tylenol), ibuprofen (Advil, Motrin), or naproxen (Aleve). Read and follow all instructions on the label. · If the doctor prescribed antibiotics, take them as directed. Do not stop taking them just because you feel better. You need to take the full course of antibiotics. · Be careful when taking over-the-counter cold or flu medicines and Tylenol at the same time. Many of these medicines have acetaminophen, which is Tylenol. Read the labels to make sure that you are not taking more than the recommended dose. Too much acetaminophen (Tylenol) can be harmful. · Breathe warm, moist air from a steamy shower, a hot bath, or a sink filled with hot water. Avoid cold, dry air. Using a humidifier in your home may help. Follow the directions for cleaning the machine. · Use saline (saltwater) nasal washes. This can help keep your nasal passages open and wash out mucus and bacteria. You can buy saline nose drops at a grocery store or drugstore. Or you can make your own at home by adding 1 teaspoon of salt and 1 teaspoon of baking soda to 2 cups of distilled water. If you make your own, fill a bulb syringe with the solution, insert the tip into your nostril, and squeeze gently. Gearline Pauling your nose. · Put a hot, wet towel or a warm gel pack on your face 3 or 4 times a day for 5 to 10 minutes each time. · Try a decongestant nasal spray like oxymetazoline (Afrin). Do not use it for more than 3 days in a row. Using it for more than 3 days can make your congestion worse. When should you call for help? Call your doctor now or seek immediate medical care if:    · You have new or worse swelling or redness in your face or around your eyes.     · You have a new or higher fever. Watch closely for changes in your health, and be sure to contact your doctor if:    · You have new or worse facial pain.     · The mucus from your nose becomes thicker (like pus) or has new blood in it.     · You are not getting better as expected. Where can you learn more? Go to https://[employer] Biotechnology[employer]haAquiriseb.[employer] org and sign in to your [employer] account. Enter P673 in the [facility] box to learn more about \"Sinusitis: Care Instructions. \"     If you do not have an account, please click on the \"Sign Up Now\" link. Current as of: December 2, 2020               Content Version: 12.9  © 6085-8605 Healthwise[employer] Incorporated. Care instructions adapted under license by [facility] ([facility]).  If you have questions about a medical condition or this instruction, always ask your healthcare professional. [place] [address] any warranty or liability for your use of this information.

## 2021-06-24 NOTE — PROGRESS NOTES
patient's clinical or mental status as evidenced by the medical record, this patients's positive and negative responses for Review of Systems, constitutional, psych, eyes, ENT, cardiovascular, respiratory, gastrointestinal, neurological, genitourinary, musculoskeletal, integument systems and systems related to the presenting problem are either stated in the preceding or were not pertinent or were negative for the symptoms and/or complaints related to the medical problem. Past Surgical History:  has a past surgical history that includes  section; Cholecystectomy; and Abdomen surgery. Social History:  reports that she quit smoking about 3 years ago. Her smoking use included cigarettes. She has never used smokeless tobacco. She reports that she does not drink alcohol and does not use drugs. Family History: family history includes Cancer in her paternal grandfather; Diabetes in her maternal grandmother; Heart Disease in her maternal grandfather; High Blood Pressure in her brother, father, maternal grandfather, maternal grandmother, and mother; High Cholesterol in her brother, father, maternal grandfather, maternal grandmother, and mother. Allergies: Vyvanse [lisdexamfetamine]    Physical Exam         VS:  BP (!) 96/58 (Site: Left Upper Arm, Position: Sitting, Cuff Size: Large Adult)   Pulse 68   Temp 98.1 °F (36.7 °C) (Oral)   Resp 17   Ht 5' 8\" (1.727 m)   Wt 199 lb (90.3 kg)   LMP 06/10/2021   SpO2 99%   BMI 30.26 kg/m²    Oxygen Saturation Interpretation: Normal.  Patient seen in dimmed light room  Constitutional:  Alert, development consistent with age. Non toxic  Head: Some  TTP over the frontal and maxillary sinuses. Ears:  External Ears: Bilateral pinna normal. TMs dull bilaterally without erythema or perforation bilaterally. Canals normal bilaterally without swelling or exudate  Nose:  Some thick yellow tinged congestion of the nasal mucosa.  There is Some mild to moderate injection to middle turbinates bilaterally, some narrowing of left nasal passage with septal deviation noted. Throat: Some mild posterior pharyngeal erythema with thick post nasal drip present. No exudate or tonsillar hypertrophy noted. Neck:  Supple. There is some shotty anterior cervical adenopathy. No rigidity  Lungs: Clear to ausculation without wheezes, rales, or rhonchi  Heart:  Regular rate and rhythm, normal heart sounds, with no ectopy  Skin:  Normal turgor. Warm and dry, no rash noted. Neurological:  Alert and oriented. Motor functions intact. Responds to verbal commands. CN 2 through 12 grossly intact. Assessment / Plan     Impression(s):  Van was seen today for sinus problem. Diagnoses and all orders for this visit:    Acute bacterial sinusitis  -     amoxicillin-clavulanate (AUGMENTIN) 875-125 MG per tablet; Take 1 tablet by mouth 2 times daily for 10 days    Chronic sinusitis, unspecified location  -     [facility], DO, Ear, Nose, Throat, [place]    Migraine without status migrainosus, not intractable, unspecified migraine type      Disposition:  Disposition: Discharge to home. Patient declined Toradol, Benadryl or antiemetic injection in office. She was in office with her mother today. Patient is neurologically intact. She did just take her 2nd Imitrex prior to her visit today. Script written for Augmentin BID, take with food, side effects discussed. Increase fluids and rest.  Continue her Flonase and Zyrtec. Advised saline nasal flushes or hot steamy showers. Symptomatic relief discussed. F/u PCP in 5-7 days if symptoms persist. Referral for ENT made per patient request. Possible nasal polyps. ED sooner if symptoms worsen or change with severe or worst HA of her life, neck pain or stiffness, visual changes or persistent vomiting or fever. Red flag symptoms discussed. Pt is in agreement with this care plan. All questions answered.     Jaya Ritter PA-C This visit was provided as a focused evaluation during the COVID -19 pandemic/national emergency. A comprehensive review of all previous patient history and testing was not conducted. Pertinent findings were elicited during the visit.

## 2021-07-19 RX ORDER — TOPIRAMATE 50 MG/1
TABLET, FILM COATED ORAL
Qty: 60 TABLET | Refills: 5 | Status: SHIPPED
Start: 2021-07-19 | End: 2022-01-07 | Stop reason: SDUPTHER

## 2021-08-10 ENCOUNTER — OFFICE VISIT (OUTPATIENT)
Dept: ENT CLINIC | Age: 36
End: 2021-08-10
Payer: COMMERCIAL

## 2021-08-10 VITALS
BODY MASS INDEX: 30.16 KG/M2 | WEIGHT: 199 LBS | HEIGHT: 68 IN | SYSTOLIC BLOOD PRESSURE: 112 MMHG | DIASTOLIC BLOOD PRESSURE: 75 MMHG

## 2021-08-10 DIAGNOSIS — J31.0 RHINITIS, CHRONIC: ICD-10-CM

## 2021-08-10 DIAGNOSIS — J32.4 CHRONIC PANSINUSITIS: Primary | ICD-10-CM

## 2021-08-10 PROCEDURE — G8417 CALC BMI ABV UP PARAM F/U: HCPCS | Performed by: OTOLARYNGOLOGY

## 2021-08-10 PROCEDURE — 99204 OFFICE O/P NEW MOD 45 MIN: CPT | Performed by: OTOLARYNGOLOGY

## 2021-08-10 PROCEDURE — G8427 DOCREV CUR MEDS BY ELIG CLIN: HCPCS | Performed by: OTOLARYNGOLOGY

## 2021-08-10 PROCEDURE — 1036F TOBACCO NON-USER: CPT | Performed by: OTOLARYNGOLOGY

## 2021-08-10 RX ORDER — LORATADINE 10 MG/1
10 TABLET ORAL DAILY
COMMUNITY

## 2021-08-10 RX ORDER — AZELASTINE 1 MG/ML
2 SPRAY, METERED NASAL 2 TIMES DAILY
Qty: 1 BOTTLE | Refills: 1 | Status: SHIPPED
Start: 2021-08-10 | End: 2022-08-10 | Stop reason: SDUPTHER

## 2021-08-10 ASSESSMENT — ENCOUNTER SYMPTOMS
SINUS PAIN: 1
SINUS PRESSURE: 1
SHORTNESS OF BREATH: 0
RHINORRHEA: 1
VOMITING: 0
COUGH: 0

## 2021-08-10 NOTE — PROGRESS NOTES
[facility] Otolaryngology  Dr. Kam Hamm. LAMONT Abel Ms.Ed. New Consult       Patient Name:  Vick Alvarez  :  1985     CHIEF C/O:    Chief Complaint   Patient presents with    New Patient     chronic sinusitis       HISTORY OBTAINED FROM:  patient    HISTORY OF PRESENT ILLNESS:       Jose Maya is a 37y.o. year old female, here today for chronic sinusitis; no previous sinus surgeries; multiple infections as child  she nasal congestion with frequent rhinorrhea, as well as having known history of frequent headaches. No current complaints of hoarseness shortness of breath stridor, patient has tried over-the-counter Claritin in the past with only minimal response. No current complaints of active fever chills although she has been using nasal Flonase as well as Astelin. Patient on the past has been utilizing antibiotic therapies, provided by both the urgent care as well as primary care physician without any significant overall improvement. Past Medical History:   Diagnosis Date    ADHD (attention deficit hyperactivity disorder)     Complication of anesthesia     states spinal did not last entire case    Ethmoid sinusitis 2020    History of migraine 2020    Migraine without aura and without status migrainosus, not intractable 2020    Postpartum depression     with first child    Thrombophilia ([facility] [address]      Past Surgical History:   Procedure Laterality Date    ABDOMEN SURGERY       section x 2     SECTION      CHOLECYSTECTOMY         Current Outpatient Medications:     loratadine (CLARITIN) 10 MG tablet, Take 10 mg by mouth daily, Disp: , Rfl:     topiramate (TOPAMAX) 50 MG tablet, Take one twice daily or two at bedtime, Disp: 60 tablet, Rfl: 5    SUMAtriptan (IMITREX) 50 MG tablet, No more than 2 in 24 hours. , Disp: 9 tablet, Rfl: 3    prochlorperazine (COMPAZINE) 5 MG tablet, Take 1 tablet by mouth every 8 hours as needed for Nausea, Disp: 120 tablet, Rfl: 1   fluticasone (FLONASE) 50 MCG/ACT nasal spray, 1 spray by Each Nostril route daily, Disp: 2 Bottle, Rfl: 1    cetirizine (ZYRTEC) 10 MG tablet, Take 10 mg by mouth nightly (Patient not taking: Reported on 8/10/2021), Disp: , Rfl:   Vyvanse [lisdexamfetamine]  Social History     Tobacco Use    Smoking status: Former Smoker     Types: Cigarettes     Quit date: 12/18/2017     Years since quitting: 3.6    Smokeless tobacco: Never Used   Vaping Use    Vaping Use: Never used   Substance Use Topics    Alcohol use: No    Drug use: No     Family History   Problem Relation Age of Onset    High Blood Pressure Mother     High Cholesterol Mother     High Cholesterol Father     High Blood Pressure Father     High Cholesterol Brother     High Blood Pressure Brother     Diabetes Maternal Grandmother     High Blood Pressure Maternal Grandmother     High Cholesterol Maternal Grandmother     Heart Disease Maternal Grandfather     High Cholesterol Maternal Grandfather     High Blood Pressure Maternal Grandfather     Cancer Paternal Grandfather        Review of Systems   Constitutional: Negative for chills and fever. HENT: Positive for rhinorrhea, sinus pressure and sinus pain. Negative for ear discharge and hearing loss. Respiratory: Negative for cough and shortness of breath. Cardiovascular: Negative for chest pain and palpitations. Gastrointestinal: Negative for vomiting. Skin: Negative for rash. Allergic/Immunologic: Negative for environmental allergies. Neurological: Negative for dizziness and headaches. Hematological: Does not bruise/bleed easily. All other systems reviewed and are negative. /75   Ht 5' 8\" (1.727 m)   Wt 199 lb (90.3 kg)   BMI 30.26 kg/m²   Physical Exam  Vitals and nursing note reviewed. Constitutional:       Appearance: She is well-developed. HENT:      Head: Normocephalic and atraumatic. No contusion, masses or laceration.       Jaw: No tenderness or Plastic Surgery  :[facility] Otolaryngology/Facial Plastic Surgery Residency  Associate Clinical Professor:  Kayla Tineo [facility]

## 2021-08-16 ENCOUNTER — TELEPHONE (OUTPATIENT)
Dept: NEUROLOGY | Age: 36
End: 2021-08-16

## 2021-08-16 ENCOUNTER — VIRTUAL VISIT (OUTPATIENT)
Dept: NEUROLOGY | Age: 36
End: 2021-08-16
Payer: COMMERCIAL

## 2021-08-16 DIAGNOSIS — G43.009 MIGRAINE WITHOUT AURA AND WITHOUT STATUS MIGRAINOSUS, NOT INTRACTABLE: Primary | ICD-10-CM

## 2021-08-16 PROCEDURE — G8427 DOCREV CUR MEDS BY ELIG CLIN: HCPCS | Performed by: PSYCHIATRY & NEUROLOGY

## 2021-08-16 PROCEDURE — 99214 OFFICE O/P EST MOD 30 MIN: CPT | Performed by: PSYCHIATRY & NEUROLOGY

## 2021-08-16 RX ORDER — ELETRIPTAN HYDROBROMIDE 20 MG/1
TABLET, FILM COATED ORAL
Qty: 6 TABLET | Refills: 2 | Status: SHIPPED
Start: 2021-08-16 | End: 2021-08-17 | Stop reason: CLARIF

## 2021-08-16 NOTE — PROGRESS NOTES
[facility] [place], [facility] by [facility] [address] NEUROLOGY  [address] [address]  Dept: [phone]    Telehealth follow up Note      Yolanda Cash     Date of Visit:  8/16/2021  Primary Provider: Adelia Torres MD    CC:  Telehealth Neurology follow-up,  migraines without visual aura  Chief Complaint   Patient presents with    Migraine     pt states she has notice a increase in migraines, most recent migraine lasted 3 days     Consent:  The patient and/or health care decision maker is aware that that he may receive a bill for this tele-health service [facility], depending on his insurance coverage, and has provided verbal consent to proceed: Yes  My patient is aware that they will need a follow-up visit (in-person or virtually) at the appropriate time indicated for continued medications. Further, my patient is aware that when this acute crisis has lifted, they will be expected to return for an in-person visit and all elements of standard local and hospital guidelines in order to continue this medication. HPI: 42-year-old woman with history of migraines associated without a visual aura, who reports a recent exacerbation with increase of migraines and most recent migraine headache lasting for 3 days. Presently, she is taking topiramate 50 mg in the morning and 75 mg at bedtime. I have advised a further titration of topiramate to 50 mg in the morning 100 mg at bedtime and also ordered Aimovig 70 mg subcutaneous monthly injections, a CGRP-targeted therapy which will require insurance authorization. Imitrex was also not effective in helping to abort the migraines, thus have discontinued Imitrex in place of Relpax 20 mg strength tablet at migraine onset which may repeat be repeated in 2 hours but no more than 40 mg in a 24-hour period. She is also less utilizing as needed Compazine for nausea associated with migraine as directed. Headache pain is worse.   Appropriate analgesia with current medications regimen: no.    Change in quality of symptoms:no. Medication side effects:none. Recent diagnostic testing:none. Lab data: None recent    Imaging data: None recent       Past Medical History: Reviewed    Past Surgical History: Reviewed     Home Medications: Reviewed    Current Outpatient Medications   Medication Sig Dispense Refill    Erenumab-aooe 70 MG/ML SOAJ Inject one dose subcutaneously every 30 days 1 pen 11    eletriptan (RELPAX) 20 MG tablet May repeat in 2 hours if necessary 6 tablet 2    loratadine (CLARITIN) 10 MG tablet Take 10 mg by mouth daily      azelastine (ASTELIN) 0.1 % nasal spray 2 sprays by Nasal route 2 times daily Use in each nostril as directed 1 Bottle 1    topiramate (TOPAMAX) 50 MG tablet Take one twice daily or two at bedtime 60 tablet 5    prochlorperazine (COMPAZINE) 5 MG tablet Take 1 tablet by mouth every 8 hours as needed for Nausea 120 tablet 1    fluticasone (FLONASE) 50 MCG/ACT nasal spray 1 spray by Each Nostril route daily 2 Bottle 1    cetirizine (ZYRTEC) 10 MG tablet Take 10 mg by mouth nightly  (Patient not taking: Reported on 8/16/2021)       No current facility-administered medications for this visit. Allergies: Reviewed     Social History: Reviewed     REVIEW OF SYSTEMS:     Fernando Trotter denies fever/chills, chest pain, shortness of breath, new bowel or bladder complaints. All other review of systems was negative. Recent migraine exacerbation reported with migraine lasting for 3 days. VIRTUAL NEUROLOGIC EXAMINATION:      General:      Build:Normal Weight  A & O x3  Speech/language: Clear, fluent  CN II-XII: Remains grossly intact throughout. Pupils are equal and reactive to light. EOMs appear full without nystagmus. Visual fields remain full. Facial expression and sensation are normal and symmetric. Hearing is grossly intact. The tongue is midline. Motor/Sensory Exam: No focal weakness.  No focal sensory or numbness complaints. No tremors. Coordination: No limb incoordination. Gait:normal    HEENT:    Head:normocephalic and atraumatic  Pupils:normal, regular, round and equal.  Sclera: icterus absent    Lungs:    Breathing:Normal expansion. Clear to auscultation. No rales, rhonchi, or wheezing. Musculoskeletal:    SLR:not done right, not done left, sitting     Extremities:    Tremors:None bilaterally upper and lower  Range of motion:Generally normal     Dermatology:    Skin:no skin lesions    Impression/Plan:  1. Migraine headaches not a visual aura, recent exacerbation of worsening migraine frequency and intensity. I have advised a further titration of topiramate to 50 mg in the morning 100 mg at bedtime, have ordered Aimovig 70 mg monthly subcutaneous injections, a CGRPtargeted therapy for migraine. I have changed Imitrex to Relpax 20 mg strength tablet at the migraine headache onset which may be repeated in 2 hours but no more than 40 mg in a 24-hour period. She will continue to use as needed Compazine for nausea associated with migraine as directed. 2. Follow-up in the Neurology clinic in 6 weeks otherwise. Patient advised regarding steps to help prevent the spread of COVID-19   SOURCE - https://daniel-dunn.info/. html     1-Stay home except to get medical care  2-Clean your hands often for at least 20 seconds, avoid touching: Avoid touching your eyes, nose, and mouth with unwashed hands. 3-Seek medical attention: Seek prompt medical attention if your illness is worsening (e.g., difficulty breathing). Call you doctor first.  3-Wear a facemask if you are sick   4-Cover your coughs and sneezes           I affirm this is a Patient Initiated Episode with an established Patient who has not had a related appointment within my department in the past 7 days or scheduled within the next 24 hours. Note: This visit was completed virtually using DOXY. ME    Total Time: 20 mins.     Patient location: home  Provider location: office    Cc: Referring physician    Leonora Jolley M.D.

## 2021-08-17 DIAGNOSIS — G43.009 MIGRAINE WITHOUT AURA AND WITHOUT STATUS MIGRAINOSUS, NOT INTRACTABLE: Primary | ICD-10-CM

## 2021-08-17 RX ORDER — RIZATRIPTAN BENZOATE 5 MG/1
TABLET ORAL
Qty: 6 TABLET | Refills: 3 | Status: SHIPPED
Start: 2021-08-17 | End: 2021-09-27 | Stop reason: ALTCHOICE

## 2021-09-16 ENCOUNTER — TELEPHONE (OUTPATIENT)
Dept: NEUROLOGY | Age: 36
End: 2021-09-16

## 2021-09-16 NOTE — TELEPHONE ENCOUNTER
Message noted. She could increase topiramate to 50 mg in the morning and 100 mg at bedtime for migraine prophylaxis.   Rizatriptan or Maxalt is only as short acting, abortive medication

## 2021-09-16 NOTE — TELEPHONE ENCOUNTER
Pt contact office stating she has experienced a couple of migraines and noticed medication Maxalt does not see to help.

## 2021-09-24 ENCOUNTER — OFFICE VISIT (OUTPATIENT)
Dept: ENT CLINIC | Age: 36
End: 2021-09-24
Payer: COMMERCIAL

## 2021-09-24 VITALS
WEIGHT: 193 LBS | HEART RATE: 63 BPM | SYSTOLIC BLOOD PRESSURE: 117 MMHG | HEIGHT: 68 IN | DIASTOLIC BLOOD PRESSURE: 68 MMHG | BODY MASS INDEX: 29.25 KG/M2

## 2021-09-24 DIAGNOSIS — J32.9 CHRONIC SINUSITIS, UNSPECIFIED LOCATION: Primary | ICD-10-CM

## 2021-09-24 PROCEDURE — 99213 OFFICE O/P EST LOW 20 MIN: CPT | Performed by: OTOLARYNGOLOGY

## 2021-09-24 PROCEDURE — G8427 DOCREV CUR MEDS BY ELIG CLIN: HCPCS | Performed by: OTOLARYNGOLOGY

## 2021-09-24 PROCEDURE — G8417 CALC BMI ABV UP PARAM F/U: HCPCS | Performed by: OTOLARYNGOLOGY

## 2021-09-24 PROCEDURE — 1036F TOBACCO NON-USER: CPT | Performed by: OTOLARYNGOLOGY

## 2021-09-24 ASSESSMENT — ENCOUNTER SYMPTOMS
RHINORRHEA: 1
COUGH: 0
SINUS PAIN: 1
SINUS PRESSURE: 1
SHORTNESS OF BREATH: 0
VOMITING: 0

## 2021-09-24 NOTE — PROGRESS NOTES
[facility] Otolaryngology  Dr. Elana Peacock. LAMONT Abel Ms.Ed. New Consult       Patient Name:  Nancy Cortez  :  1985     CHIEF C/O:    Chief Complaint   Patient presents with    Follow-up     1mo f/u CT sinus results and astelin       HISTORY OBTAINED FROM:  patient    HISTORY OF PRESENT ILLNESS:       Chasidy Chacko is a 37y.o. year old female, here today for chronic sinusitis; no previous sinus surgeries; multiple infections as child  she nasal congestion with frequent rhinorrhea, as well as having known history of frequent headaches. No current complaints of hoarseness shortness of breath stridor, patient has tried over-the-counter Claritin in the past with only minimal response. No current complaints of active fever chills although she has been using nasal Flonase as well as Astelin. Patient on the past has been utilizing antibiotic therapies, provided by both the urgent care as well as primary care physician without any significant overall improvement. 21: follow up for sinusitis. Patient started on flonase and astelin last visit with minimal improvement of her symptoms. Past Medical History:   Diagnosis Date    ADHD (attention deficit hyperactivity disorder)     Complication of anesthesia     states spinal did not last entire case    Ethmoid sinusitis 2020    History of migraine 2020    Migraine without aura and without status migrainosus, not intractable 2020    Postpartum depression     with first child    Thrombophilia ([facility] [address]      Past Surgical History:   Procedure Laterality Date    ABDOMEN SURGERY       section x 2     SECTION      CHOLECYSTECTOMY         Current Outpatient Medications:     rizatriptan (MAXALT) 5 MG tablet, Chronic migraine onset, may repeat in 2 hours if needed.   No more than 2 tablets in a 24-hour period with limit of #6/month, Disp: 6 tablet, Rfl: 3    Erenumab-aooe 70 MG/ML SOAJ, Inject one dose subcutaneously every 30 days, Disp: 1 pen, Rfl: 11    loratadine (CLARITIN) 10 MG tablet, Take 10 mg by mouth daily, Disp: , Rfl:     azelastine (ASTELIN) 0.1 % nasal spray, 2 sprays by Nasal route 2 times daily Use in each nostril as directed, Disp: 1 Bottle, Rfl: 1    topiramate (TOPAMAX) 50 MG tablet, Take one twice daily or two at bedtime, Disp: 60 tablet, Rfl: 5    prochlorperazine (COMPAZINE) 5 MG tablet, Take 1 tablet by mouth every 8 hours as needed for Nausea, Disp: 120 tablet, Rfl: 1    fluticasone (FLONASE) 50 MCG/ACT nasal spray, 1 spray by Each Nostril route daily, Disp: 2 Bottle, Rfl: 1    cetirizine (ZYRTEC) 10 MG tablet, Take 10 mg by mouth nightly , Disp: , Rfl:   Vyvanse [lisdexamfetamine]  Social History     Tobacco Use    Smoking status: Former Smoker     Types: Cigarettes     Quit date: 12/18/2017     Years since quitting: 3.7    Smokeless tobacco: Never Used   Vaping Use    Vaping Use: Never used   Substance Use Topics    Alcohol use: No    Drug use: No     Family History   Problem Relation Age of Onset    High Blood Pressure Mother     High Cholesterol Mother     High Cholesterol Father     High Blood Pressure Father     High Cholesterol Brother     High Blood Pressure Brother     Diabetes Maternal Grandmother     High Blood Pressure Maternal Grandmother     High Cholesterol Maternal Grandmother     Heart Disease Maternal Grandfather     High Cholesterol Maternal Grandfather     High Blood Pressure Maternal Grandfather     Cancer Paternal Grandfather        Review of Systems   Constitutional: Negative for chills and fever. HENT: Positive for rhinorrhea, sinus pressure and sinus pain. Negative for ear discharge and hearing loss. Respiratory: Negative for cough and shortness of breath. Cardiovascular: Negative for chest pain and palpitations. Gastrointestinal: Negative for vomiting. Skin: Negative for rash. Allergic/Immunologic: Negative for environmental allergies.    Neurological: Negative for dizziness and headaches. Hematological: Does not bruise/bleed easily. All other systems reviewed and are negative. /68 (Site: Left Upper Arm, Position: Sitting, Cuff Size: Large Adult)   Pulse 63   Ht 5' 8\" (1.727 m)   Wt 193 lb (87.5 kg)   BMI 29.35 kg/m²   Physical Exam  Vitals and nursing note reviewed. Constitutional:       Appearance: She is well-developed. HENT:      Head: Normocephalic and atraumatic. No contusion, masses or laceration. Jaw: No tenderness or swelling. Salivary Glands: Right salivary gland is not diffusely enlarged. Left salivary gland is not diffusely enlarged. Right Ear: Tympanic membrane, ear canal and external ear normal. No decreased hearing noted. No drainage. Left Ear: Tympanic membrane, ear canal and external ear normal. No decreased hearing noted. No drainage. Nose: Septal deviation, congestion and rhinorrhea present. No laceration or mucosal edema. Right Nostril: No epistaxis. Left Nostril: No epistaxis. Right Turbinates: Swollen. Left Turbinates: Swollen. Mouth/Throat:      Mouth: Mucous membranes are moist. No lacerations or oral lesions. Dentition: No gum lesions. Tongue: No lesions. Palate: No mass and lesions. Pharynx: No oropharyngeal exudate or posterior oropharyngeal erythema. Tonsils: No tonsillar exudate or tonsillar abscesses. Eyes:      Pupils: Pupils are equal, round, and reactive to light. Neck:      Thyroid: No thyromegaly. Trachea: No tracheal deviation. Cardiovascular:      Rate and Rhythm: Normal rate. Pulmonary:      Effort: Pulmonary effort is normal. No respiratory distress. Musculoskeletal:         General: Normal range of motion. Cervical back: Normal range of motion. Lymphadenopathy:      Cervical: No cervical adenopathy. Skin:     General: Skin is warm. Findings: No erythema.    Neurological:      Mental Status: She is alert.      Cranial Nerves: No cranial nerve deficit. IMPRESSION/PLAN:  Patient with chronic rhinosinusitis with allergic congestion and frequent pansinus disease refractory to antihistamine, antibiotic therapy, and conservative measures including plain saline rinses. , patient will use Flonase in the a.m., followed by Astelin p.m. as well as a CT scan to rule out any obstructive pathology risk and benefits reviewed follow-up in 6 weeks. Electronically signed by Jennifer Gonzalez DO on 9/24/2021 at 10:44 AM    Dr. Rogelio Schmid.  Otolaryngology Facial Plastic Surgery  :[facility] Otolaryngology/Facial Plastic Surgery Residency  Associate Clinical Professor:  Leona Chung, [address] [place] [address]  1985      I have discussed the case, including pertinent history and exam findings with the resident. I have seen and examined the patient and the key elements of the encounter have been performed by me. I agree with the assessment, plan and orders as documented by the resident. Patient here for follow up of medical problems. Remainder of medical problems as per resident note.       [facility], DO  9/28/21

## 2021-09-27 ENCOUNTER — VIRTUAL VISIT (OUTPATIENT)
Dept: NEUROLOGY | Age: 36
End: 2021-09-27
Payer: COMMERCIAL

## 2021-09-27 DIAGNOSIS — G43.019 INTRACTABLE MIGRAINE WITHOUT AURA AND WITHOUT STATUS MIGRAINOSUS: Primary | ICD-10-CM

## 2021-09-27 PROCEDURE — 99214 OFFICE O/P EST MOD 30 MIN: CPT | Performed by: PSYCHIATRY & NEUROLOGY

## 2021-09-27 PROCEDURE — G8427 DOCREV CUR MEDS BY ELIG CLIN: HCPCS | Performed by: PSYCHIATRY & NEUROLOGY

## 2021-09-27 RX ORDER — SUMATRIPTAN 100 MG/1
100 TABLET, FILM COATED ORAL
Qty: 9 TABLET | Refills: 5 | Status: SHIPPED
Start: 2021-09-27 | End: 2022-03-16

## 2021-09-27 NOTE — PROGRESS NOTES
[facility] [address] NEUROLOGY  [address] [address]  Dept: [phone]    Telehealth follow up Note      Francisco Javier Bird     Date of Visit:  9/27/2021  Primary Provider: Gavino Edward MD    CC:  Telehealth Neurology follow up, migraines without a visual aura. Consent:  The patient and/or health care decision maker is aware that that he may receive a bill for this tele-health service [facility]oxy Me, depending on his insurance coverage, and has provided verbal consent to proceed: Yes  My patient is aware that they will need a follow-up visit (in-person or virtually) at the appropriate time indicated for continued medications. Further, my patient is aware that when this acute crisis has lifted, they will be expected to return for an in-person visit and all elements of standard local and hospital guidelines in order to continue this medication. HPI: 53-year-old woman with history of migraines not associate with a visual aura presenting for a virtual follow-up office visit today. She had previously reported worsening of migraines and I ordered a CGRPtargeted therapy, Aimovig 70 mg subcutaneous monthly injections which she reports at this time she never knew was approved by her insurer and thus did not start the medication trial.  She continues topiramate 50 mg in the morning 100 mg at bedtime which was previously advised to be titrated to that level as well. She was changed from Imitrex to Maxalt 5 mg (I.e., Relpax was not available on formulary) at migraine onset for abortive migraine therapy, but didn't help that much. She also uses as needed Compazine for nausea as directed. Please refer to prior Neurology consult letter of 8/16/2021 for additional information if needed. ROS, family/social history, medication/allergy list: Reviewed, updated. Headache pain is unchanged.   Appropriate analgesia with current medications regimen: no.  She tried topiramate 50 mg the morning 100 mg at bedtime and as needed Maxalt for 5 mg tablet for migraine exacerbation but did not really help to improve her migraines. Unfortunately, she did not know that Rafi Perone was approved previously and did not start the Rafi Perone yet. Change in quality of symptoms:no. Medication side effects:none. Recent diagnostic testing:none. Lab data: reviewed from 12/3/20    Imaging data: none recent     Past Medical History: Reviewed    Past Surgical History: Reviewed     Home Medications: Reviewed    Current Outpatient Medications   Medication Sig Dispense Refill    rizatriptan (MAXALT) 5 MG tablet Chronic migraine onset, may repeat in 2 hours if needed. No more than 2 tablets in a 24-hour period with limit of #6/month 6 tablet 3    Erenumab-aooe 70 MG/ML SOAJ Inject one dose subcutaneously every 30 days 1 pen 11    loratadine (CLARITIN) 10 MG tablet Take 10 mg by mouth daily      azelastine (ASTELIN) 0.1 % nasal spray 2 sprays by Nasal route 2 times daily Use in each nostril as directed 1 Bottle 1    topiramate (TOPAMAX) 50 MG tablet Take one twice daily or two at bedtime 60 tablet 5    prochlorperazine (COMPAZINE) 5 MG tablet Take 1 tablet by mouth every 8 hours as needed for Nausea 120 tablet 1    fluticasone (FLONASE) 50 MCG/ACT nasal spray 1 spray by Each Nostril route daily 2 Bottle 1    cetirizine (ZYRTEC) 10 MG tablet Take 10 mg by mouth nightly        No current facility-administered medications for this visit. Allergies: Reviewed     Social History: Reviewed     REVIEW OF SYSTEMS:     [facility] denies fever/chills, chest pain, shortness of breath, new bowel or bladder complaints. All other review of systems was negative. Reports migraine headaches unchanged. VIRTUAL NEUROLOGIC EXAMINATION:      General:      Build:Normal Weight  A & O x3  Speech/language: Clear, fluent  CN II-XII: Remains grossly intact throughout. Pupils are equal and reactive to light.   EOMs are full without nystagmus. Visual fields are grossly full. Facial expression sensation normal and symmetric. Hearing is grossly intact. The tongue is midline. Motor/Sensory Exam: No focal weakness. No focal sensory or numbness complaints. Coordination: No limb incoordination. No tremors. Gait:normal    HEENT:    Head:normocephalic and atraumatic  Pupils:regular, round and equal.  Sclera: icterus absent    Lungs:    Breathing:Normal expansion. Clear to auscultation. No rales, rhonchi, or wheezing. Musculoskeletal:    SLR:not done right, not done left, sitting     Extremities:    Tremors:None bilaterally upper and lower  Range of motion:Generally normal     Dermatology:    Skin:no skin lesions    Impression/Plan:  1. Chronic episodic migraines not associated with a visual aura. She reports migraines did not improve with a further titration of topiramate to 50 mg in the morning 100 mg at bedtime and change from Imitrex to Maxalt 5 mg strength tablet for abortive migraine therapy. Relpax was not available on her pharmacy formulary. She prefers changing back to sumatriptan 100 mg tablet at the onset of a migraine for use as a rescue headache medication. 2. She also reports her GYN doctor felt her headaches were hormonally related to her menstrual cycles and is placing her back on Mirena. She may consider decreasing topiramate to 50 mg twice daily once she starts Aimovig. 3. She will continue to use sumatriptan 100 mg strength tablet at the onset of an acute migraine exacerbation as directed with limit of #9/month and as needed Compazine as directed for nausea associated with migraine. 4. Follow-up in the Neurology clinic within 2 months, virtual visit preferred. Patient advised regarding steps to help prevent the spread of COVID-19   SOURCE - https://daniel-shaun.info/. html     1-Stay home except to get medical care  2-Clean your hands often for at least 20 seconds, avoid touching: Avoid touching your eyes, nose, and mouth with unwashed hands. 3-Seek medical attention: Seek prompt medical attention if your illness is worsening (e.g., difficulty breathing). Call you doctor first.  3-Wear a facemask if you are sick   4-Cover your coughs and sneezes           I affirm this is a Patient Initiated Episode with an established Patient who has not had a related appointment within my department in the past 7 days or scheduled within the next 24 hours. Note: This visit was completed virtually using DOXY. ME    Total Time: 25 mins.     Patient location: Home  Provider location: Office    Cc: Referring physician    Brian Pearce M.D.

## 2021-09-30 DIAGNOSIS — G43.009 MIGRAINE WITHOUT AURA AND WITHOUT STATUS MIGRAINOSUS, NOT INTRACTABLE: ICD-10-CM

## 2021-11-04 ENCOUNTER — TELEPHONE (OUTPATIENT)
Dept: NEUROLOGY | Age: 36
End: 2021-11-04

## 2021-11-04 NOTE — TELEPHONE ENCOUNTER
Message noted. For symptoms described, which are not typical for her migraines as reported, she is advised to go to the ED for an evaluation.

## 2021-11-05 DIAGNOSIS — Z11.59 NEED FOR HEPATITIS C SCREENING TEST: ICD-10-CM

## 2021-11-05 DIAGNOSIS — Z00.00 ANNUAL PHYSICAL EXAM: ICD-10-CM

## 2021-11-05 LAB
ALBUMIN SERPL-MCNC: 4.4 G/DL (ref 3.5–5.2)
ALP BLD-CCNC: 77 U/L (ref 35–104)
ALT SERPL-CCNC: 13 U/L (ref 0–32)
ANION GAP SERPL CALCULATED.3IONS-SCNC: 15 MMOL/L (ref 7–16)
AST SERPL-CCNC: 14 U/L (ref 0–31)
BACTERIA: ABNORMAL /HPF
BASOPHILS ABSOLUTE: 0.03 E9/L (ref 0–0.2)
BASOPHILS RELATIVE PERCENT: 0.4 % (ref 0–2)
BILIRUB SERPL-MCNC: 0.6 MG/DL (ref 0–1.2)
BILIRUBIN URINE: NEGATIVE
BLOOD, URINE: NEGATIVE
BUN BLDV-MCNC: 10 MG/DL (ref 6–20)
CALCIUM SERPL-MCNC: 9 MG/DL (ref 8.6–10.2)
CHLORIDE BLD-SCNC: 108 MMOL/L (ref 98–107)
CHOLESTEROL, FASTING: 208 MG/DL (ref 0–199)
CLARITY: ABNORMAL
CO2: 18 MMOL/L (ref 22–29)
COLOR: YELLOW
CREAT SERPL-MCNC: 1 MG/DL (ref 0.5–1)
EOSINOPHILS ABSOLUTE: 0.08 E9/L (ref 0.05–0.5)
EOSINOPHILS RELATIVE PERCENT: 1.2 % (ref 0–6)
EPITHELIAL CELLS, UA: ABNORMAL /HPF
GFR AFRICAN AMERICAN: >60
GFR NON-AFRICAN AMERICAN: >60 ML/MIN/1.73
GLUCOSE FASTING: 105 MG/DL (ref 74–99)
GLUCOSE URINE: NEGATIVE MG/DL
HCT VFR BLD CALC: 39.8 % (ref 34–48)
HDLC SERPL-MCNC: 38 MG/DL
HEMOGLOBIN: 12.6 G/DL (ref 11.5–15.5)
IMMATURE GRANULOCYTES #: 0.03 E9/L
IMMATURE GRANULOCYTES %: 0.4 % (ref 0–5)
KETONES, URINE: NEGATIVE MG/DL
LDL CHOLESTEROL CALCULATED: 149 MG/DL (ref 0–99)
LEUKOCYTE ESTERASE, URINE: NEGATIVE
LYMPHOCYTES ABSOLUTE: 1.93 E9/L (ref 1.5–4)
LYMPHOCYTES RELATIVE PERCENT: 28.4 % (ref 20–42)
MCH RBC QN AUTO: 28.4 PG (ref 26–35)
MCHC RBC AUTO-ENTMCNC: 31.7 % (ref 32–34.5)
MCV RBC AUTO: 89.8 FL (ref 80–99.9)
MONOCYTES ABSOLUTE: 0.47 E9/L (ref 0.1–0.95)
MONOCYTES RELATIVE PERCENT: 6.9 % (ref 2–12)
MUCUS: PRESENT /LPF
NEUTROPHILS ABSOLUTE: 4.26 E9/L (ref 1.8–7.3)
NEUTROPHILS RELATIVE PERCENT: 62.7 % (ref 43–80)
NITRITE, URINE: NEGATIVE
PDW BLD-RTO: 13.2 FL (ref 11.5–15)
PH UA: 6.5 (ref 5–9)
PLATELET # BLD: 268 E9/L (ref 130–450)
PMV BLD AUTO: 10.2 FL (ref 7–12)
POTASSIUM SERPL-SCNC: 4.2 MMOL/L (ref 3.5–5)
PROTEIN UA: NEGATIVE MG/DL
RBC # BLD: 4.43 E12/L (ref 3.5–5.5)
RBC UA: ABNORMAL /HPF (ref 0–2)
SODIUM BLD-SCNC: 141 MMOL/L (ref 132–146)
SPECIFIC GRAVITY UA: 1.02 (ref 1–1.03)
TOTAL PROTEIN: 6.9 G/DL (ref 6.4–8.3)
TRIGLYCERIDE, FASTING: 106 MG/DL (ref 0–149)
TSH SERPL DL<=0.05 MIU/L-ACNC: 0.84 UIU/ML (ref 0.27–4.2)
UROBILINOGEN, URINE: 1 E.U./DL
VLDLC SERPL CALC-MCNC: 21 MG/DL
WBC # BLD: 6.8 E9/L (ref 4.5–11.5)
WBC UA: ABNORMAL /HPF (ref 0–5)

## 2021-11-08 LAB — HEPATITIS C ANTIBODY INTERPRETATION: NORMAL

## 2021-11-09 ENCOUNTER — OFFICE VISIT (OUTPATIENT)
Dept: FAMILY MEDICINE CLINIC | Age: 36
End: 2021-11-09
Payer: COMMERCIAL

## 2021-11-09 VITALS
DIASTOLIC BLOOD PRESSURE: 64 MMHG | BODY MASS INDEX: 29.27 KG/M2 | HEART RATE: 66 BPM | SYSTOLIC BLOOD PRESSURE: 108 MMHG | HEIGHT: 68 IN | OXYGEN SATURATION: 100 % | WEIGHT: 193.1 LBS | TEMPERATURE: 97.8 F

## 2021-11-09 DIAGNOSIS — G43.909 MIGRAINE WITHOUT STATUS MIGRAINOSUS, NOT INTRACTABLE, UNSPECIFIED MIGRAINE TYPE: ICD-10-CM

## 2021-11-09 DIAGNOSIS — Z23 FLU VACCINE NEED: ICD-10-CM

## 2021-11-09 DIAGNOSIS — E78.5 HYPERLIPIDEMIA, UNSPECIFIED HYPERLIPIDEMIA TYPE: Primary | ICD-10-CM

## 2021-11-09 DIAGNOSIS — M41.9 SCOLIOSIS, UNSPECIFIED SCOLIOSIS TYPE, UNSPECIFIED SPINAL REGION: ICD-10-CM

## 2021-11-09 PROCEDURE — G8482 FLU IMMUNIZE ORDER/ADMIN: HCPCS | Performed by: INTERNAL MEDICINE

## 2021-11-09 PROCEDURE — 90674 CCIIV4 VAC NO PRSV 0.5 ML IM: CPT | Performed by: INTERNAL MEDICINE

## 2021-11-09 PROCEDURE — G8417 CALC BMI ABV UP PARAM F/U: HCPCS | Performed by: INTERNAL MEDICINE

## 2021-11-09 PROCEDURE — G8427 DOCREV CUR MEDS BY ELIG CLIN: HCPCS | Performed by: INTERNAL MEDICINE

## 2021-11-09 PROCEDURE — 1036F TOBACCO NON-USER: CPT | Performed by: INTERNAL MEDICINE

## 2021-11-09 PROCEDURE — 99213 OFFICE O/P EST LOW 20 MIN: CPT | Performed by: INTERNAL MEDICINE

## 2021-11-09 ASSESSMENT — ENCOUNTER SYMPTOMS
SORE THROAT: 0
EYE DISCHARGE: 0
NAUSEA: 0
EYE PAIN: 0
ABDOMINAL PAIN: 0
BLOOD IN STOOL: 0
SINUS PAIN: 0
SHORTNESS OF BREATH: 0

## 2021-11-09 NOTE — PROGRESS NOTES
Chief Complaint   Patient presents with   [address] Other     flu vaccination given today     Referral - General     would like a referral for ortho for her back        HPI:  Patient is here for follow-up     Pt has c/o back pain - 2 weeks    Pt says she has h/o Scoliosis    Following with Chiropractor         Allergy and Medications are reviewed and updated. Past Medical History, Surgical History, and Family History has been reviewed and updated. Review of Systems:  Review of Systems   Constitutional: Negative for chills and fever. HENT: Negative for congestion, sinus pain and sore throat. Eyes: Negative for pain and discharge. Respiratory: Negative for shortness of breath (No new SOb). Cardiovascular: Negative for chest pain. Gastrointestinal: Negative for abdominal pain, blood in stool and nausea. Genitourinary: Negative for flank pain and frequency. Musculoskeletal: Negative for neck pain. Hematological: Does not bruise/bleed easily. Psychiatric/Behavioral: Negative for suicidal ideas. Vitals:    11/09/21 1135   BP: 108/64   Pulse: 66   Temp: 97.8 °F (36.6 °C)   TempSrc: Temporal   SpO2: 100%   Weight: 193 lb 1.6 oz (87.6 kg)   Height: 5' 8\" (1.727 m)       Physical Exam  Vitals reviewed. Constitutional:       Appearance: She is well-developed. HENT:      Head: Normocephalic and atraumatic. Mouth/Throat:      Pharynx: No oropharyngeal exudate. Eyes:      Conjunctiva/sclera: Conjunctivae normal.      Pupils: Pupils are equal, round, and reactive to light. Neck:      Vascular: No JVD. Cardiovascular:      Rate and Rhythm: Normal rate and regular rhythm. Pulmonary:      Effort: Pulmonary effort is normal.      Breath sounds: Normal breath sounds. No rales. Abdominal:      General: Bowel sounds are normal.      Palpations: Abdomen is soft. Musculoskeletal:         General: Normal range of motion. Lymphadenopathy:      Cervical: No cervical adenopathy. Skin:     General: Skin is warm and dry. Neurological:      Mental Status: She is alert and oriented to person, place, and time. Psychiatric:         Behavior: Behavior normal.          Labs :    Lab Results   Component Value Date    WBC 6.8 2021    HGB 12.6 2021    HCT 39.8 2021     2021    HDL 38 2021    ALT 13 2021    AST 14 2021     2021    K 4.2 2021     (H) 2021    CREATININE 1.0 2021    BUN 10 2021    CO2 18 (L) 2021    TSH 0.836 2021    GLUF 105 (H) 2021    LABA1C 5.0 2020     Lab Results   Component Value Date    COLORU Yellow 2021    NITRU Negative 2021    GLUCOSEU Negative 2021    KETUA Negative 2021    UROBILINOGEN 1.0 2021    BILIRUBINUR Negative 2021             ASSESSMENT     Patient Active Problem List    Diagnosis Date Noted    History of migraine 2020    Ethmoid sinusitis 2020    Migraine without aura and without status migrainosus, not intractable 2020    Antepartum genitourinary tract infection 2013    Previous  delivery, antepartum condition or complication         Diagnosis:     ICD-10-CM    1. Hyperlipidemia, unspecified hyperlipidemia type  E78.5    2. Flu vaccine need  Z23 INFLUENZA, MDCK QUADV, 2 YRS AND OLDER, IM, PF, PREFILL SYR OR SDV, 0.5ML (FLUCELVAX QUADV, PF)   3. Migraine without status migrainosus, not intractable, unspecified migraine type  G43.909    4. Scoliosis, unspecified scoliosis type, unspecified spinal region  M41.9 XR SCOLIOSIS SURVEY STANDARD       PLAN:      LAbs reviewd    Adv for diet, exe, red yeast rice    Pt is stable on current medical treatment. Continue current treatment plan    Side effects/Adverse effects/Precautions are reviewed with patient.      Low salt, Low Carb diet an low fat diet  Continue medications as advised and take them regularly  Regular exercises as advised    Discussed natural and expected course of this diagnosis and need to alert me if symptoms do not follow expected course, or if any worse. Smoking cessation if applicable, discussed with patient. XR spine     There are no Patient Instructions on file for this visit. Return in about 2 months (around 1/9/2022).

## 2021-12-06 ENCOUNTER — IMMUNIZATION (OUTPATIENT)
Dept: PRIMARY CARE CLINIC | Age: 36
End: 2021-12-06
Payer: COMMERCIAL

## 2021-12-06 PROCEDURE — 0064A COVID-19, MODERNA BOOSTER VACCINE 0.25ML DOSE: CPT | Performed by: INTERNAL MEDICINE

## 2021-12-06 PROCEDURE — 91306 COVID-19, MODERNA BOOSTER VACCINE 0.25ML DOSE: CPT | Performed by: INTERNAL MEDICINE

## 2022-01-07 ENCOUNTER — TELEPHONE (OUTPATIENT)
Dept: NEUROLOGY | Age: 37
End: 2022-01-07

## 2022-01-07 DIAGNOSIS — G43.009 MIGRAINE WITHOUT AURA AND WITHOUT STATUS MIGRAINOSUS, NOT INTRACTABLE: ICD-10-CM

## 2022-01-07 RX ORDER — TOPIRAMATE 50 MG/1
TABLET, FILM COATED ORAL
Qty: 60 TABLET | Refills: 5 | Status: SHIPPED
Start: 2022-01-07 | End: 2022-03-16

## 2022-01-07 NOTE — TELEPHONE ENCOUNTER
----- Message from Charo Rodrigues MD sent at 11/18/2020 10:19 AM CST -----  Please review.  We will discuss the results during your upcoming visit with me. It looks good.     Pt needs refill on topiramate 50mg, [facility]  [address]

## 2022-01-25 ENCOUNTER — OFFICE VISIT (OUTPATIENT)
Dept: NEUROLOGY | Age: 37
End: 2022-01-25
Payer: COMMERCIAL

## 2022-01-25 VITALS
HEIGHT: 68 IN | BODY MASS INDEX: 28.49 KG/M2 | WEIGHT: 188 LBS | DIASTOLIC BLOOD PRESSURE: 80 MMHG | SYSTOLIC BLOOD PRESSURE: 100 MMHG

## 2022-01-25 DIAGNOSIS — G43.839 INTRACTABLE MENSTRUAL MIGRAINE WITHOUT STATUS MIGRAINOSUS: Primary | ICD-10-CM

## 2022-01-25 DIAGNOSIS — R25.8 ARM AND LEG MOVEMENTS, UNCONTROLLABLE: ICD-10-CM

## 2022-01-25 DIAGNOSIS — R29.810 FACIAL DROOP: ICD-10-CM

## 2022-01-25 DIAGNOSIS — G43.009 MIGRAINE WITHOUT AURA AND WITHOUT STATUS MIGRAINOSUS, NOT INTRACTABLE: ICD-10-CM

## 2022-01-25 PROCEDURE — G8482 FLU IMMUNIZE ORDER/ADMIN: HCPCS | Performed by: PSYCHIATRY & NEUROLOGY

## 2022-01-25 PROCEDURE — G8417 CALC BMI ABV UP PARAM F/U: HCPCS | Performed by: PSYCHIATRY & NEUROLOGY

## 2022-01-25 PROCEDURE — 1036F TOBACCO NON-USER: CPT | Performed by: PSYCHIATRY & NEUROLOGY

## 2022-01-25 PROCEDURE — G8427 DOCREV CUR MEDS BY ELIG CLIN: HCPCS | Performed by: PSYCHIATRY & NEUROLOGY

## 2022-01-25 PROCEDURE — 99214 OFFICE O/P EST MOD 30 MIN: CPT | Performed by: PSYCHIATRY & NEUROLOGY

## 2022-01-25 ASSESSMENT — ENCOUNTER SYMPTOMS
ALLERGIC/IMMUNOLOGIC NEGATIVE: 1
EYES NEGATIVE: 1
GASTROINTESTINAL NEGATIVE: 1
RESPIRATORY NEGATIVE: 1

## 2022-01-25 NOTE — PROGRESS NOTES
Neurology Progress Note, Follow-up:    Patient: Jason Tovar  : 1985  Date: 22  Primary provider: Paulina Bruno MD     Re: Followup tadeo  (Note: arrived late for 20-minute follow-up appointment, checked in 15 minutes past appointment time.)    Dear Paulina Bruno MD    I have seen [facility] for follow-up office visit in regards to history of migraine headaches. For migraine prophylaxis she is taking topiramate 50 mg, twice daily or 2 at bedtime. She was also prescribed Aimovig 70 mg subcutaneous monthly injections, a CGRP-targeted therapy for migraine prophylaxis has tried 2 doses. She tried as needed Maxalt 5 mg tablet at migraine onset without relief as well as Imitrex previously. She thinks Imitrex worked better than Maxalt. She has also used as needed Compazine for nausea with migraine as directed. She c/o cognitive issues with topiramate so does not wish to try a higher dose. She is not sure that Aimovig helped, perhaps a very mild benefit, but is not willing to try a higher dose. She reports only one menstrual migraine/per month. I discussed referral to [facility], headache specialist which she accepts. She also then thought about trying a higher dose of Aimovig decided she would try the 140 mg subcutaneous monthly dose so revised her prescription. She also complained of intermittent twitching of her left hand and facial droop on occasion did not report the symptoms previously and thus when she called into the office in November, was directed to present to the ED for an evaluation as were not typical of her migraine symptoms. At this time I have ordered an MR brain scan without contrast.  She had a normal MR brain scan in . Please refer to prior video visit of 2021 for additional information if needed. ROS, family/social history, medication/allergy list: Reviewed, updated.     Current Outpatient Medications   Medication Sig Dispense Refill    topiramate (TOPAMAX) 50 MG tablet Take one twice daily or two at bedtime 60 tablet 5    Erenumab-aooe 70 MG/ML SOAJ Inject one dose subcutaneously every 30 days 1 pen 11    Levonorgestrel (MIRENA, 52 MG, IU) by IntraUTERine route      SUMAtriptan (IMITREX) 100 MG tablet Take 1 tablet by mouth once as needed for Migraine 9 tablet 5    loratadine (CLARITIN) 10 MG tablet Take 10 mg by mouth daily      azelastine (ASTELIN) 0.1 % nasal spray 2 sprays by Nasal route 2 times daily Use in each nostril as directed 1 Bottle 1    prochlorperazine (COMPAZINE) 5 MG tablet Take 1 tablet by mouth every 8 hours as needed for Nausea 120 tablet 1    fluticasone (FLONASE) 50 MCG/ACT nasal spray 1 spray by Each Nostril route daily 2 Bottle 1     No current facility-administered medications for this visit.        Allergies   Allergen Reactions    Vyvanse [Lisdexamfetamine] Anxiety       Patient Active Problem List   Diagnosis    Antepartum genitourinary tract infection    Previous  delivery, antepartum condition or complication    History of migraine    Ethmoid sinusitis    Migraine without aura and without status migrainosus, not intractable       Past Medical History:   Diagnosis Date    ADHD (attention deficit hyperactivity disorder)     Complication of anesthesia     states spinal did not last entire case    Ethmoid sinusitis 2020    History of migraine 2020    Migraine without aura and without status migrainosus, not intractable 2020    Postpartum depression     with first child    Thrombophilia ([facility] [address]        Past Surgical History:   Procedure Laterality Date    ABDOMEN SURGERY       section x 2     SECTION      CHOLECYSTECTOMY         Family History   Problem Relation Age of Onset    High Blood Pressure Mother     High Cholesterol Mother     High Cholesterol Father     High Blood Pressure Father     High Cholesterol Brother     High Blood Pressure Brother     Diabetes Maternal Grandmother     High Blood Pressure Maternal Grandmother     High Cholesterol Maternal Grandmother     Heart Disease Maternal Grandfather     High Cholesterol Maternal Grandfather     High Blood Pressure Maternal Grandfather     Cancer Paternal Grandfather        Social History     Socioeconomic History    Marital status:      Spouse name: Not on file    Number of children: Not on file    Years of education: Not on file    Highest education level: Not on file   Occupational History    Not on file   Tobacco Use    Smoking status: Former Smoker     Types: Cigarettes     Quit date: 2017     Years since quittin.1    Smokeless tobacco: Never Used   Vaping Use    Vaping Use: Never used   Substance and Sexual Activity    Alcohol use: No    Drug use: No    Sexual activity: Yes     Partners: Male   Other Topics Concern    Not on file   Social History Narrative    Not on file     Social Determinants of Health     Financial Resource Strain:     Difficulty of Paying Living Expenses: Not on file   Food Insecurity: No Food Insecurity    Worried About Running Out of Food in the Last Year: Never true    Alexi of Food in the Last Year: Never true   Transportation Needs: No Transportation Needs    Lack of Transportation (Medical): No    Lack of Transportation (Non-Medical):  No   Physical Activity:     Days of Exercise per Week: Not on file    Minutes of Exercise per Session: Not on file   Stress:     Feeling of Stress : Not on file   Social Connections:     Frequency of Communication with Friends and Family: Not on file    Frequency of Social Gatherings with Friends and Family: Not on file    Attends Spiritism Services: Not on file    Active Member of Clubs or Organizations: Not on file    Attends Club or Organization Meetings: Not on file    Marital Status: Not on file   Intimate Partner Violence:     Fear of Current or Ex-Partner: Not on file    Emotionally Abused: Not on file    Physically Abused: Not on file    Sexually Abused: Not on file   Housing Stability:     Unable to Pay for Housing in the Last Year: Not on file    Number of Places Lived in the Last Year: Not on file    Unstable Housing in the Last Year: Not on file     Review of Systems   Constitutional: Negative. HENT: Negative. Eyes: Negative. Respiratory: Negative. Cardiovascular: Negative. Gastrointestinal: Negative. Endocrine: Negative. Genitourinary: Negative. Musculoskeletal: Negative. Skin: Negative. Allergic/Immunologic: Negative. Neurological: Positive for headaches. Hematological: Negative. Psychiatric/Behavioral: The patient is nervous/anxious. All other systems reviewed and are negative. Neurologic Exam:  /80 (Site: Left Upper Arm, Position: Sitting, Cuff Size: Medium Adult)   Ht 5' 8\" (1.727 m)   Wt 188 lb (85.3 kg)   BMI 28.59 kg/m²    Mental Status: Alert, oriented fully. Moderately anxious. No paraphasic word errors. CN's II-XII: Remains grossly intact throughout. Pupils are equal and reactive to light. EOMs are full without nystagmus. Visual fields remain full. Facial expression sensation are normal and symmetric. Hearing is grossly intact. The tongue is midline. Motor/Sensory Exam: Grossly intact strength in the upper and lower extremities, intact fine motor function of both hands, no pronator drift or tremors. No subjective focal sensory deficits reported. Coordination/Gait: No limb incoordination or gait ataxia    Assessment/Plan:   1. Chronic episodic menstrual migraines treated with topiramate 50 mg twice daily, as needed Maxalt or Imitrex as directed, and Aimovig 70 mg monthly subcutaneous injections x2 doses tried thus far with mixed results.   2.  I have reordered an MR brain scan without contrast and ordered an increased dose of Aimovig 140 mg monthly subcutaneous injections to try, also a referral submitted to [facility], headache specialist.  3.  Follow-up in the Neurology clinic in 3 months otherwise clinically indicated. Sincerely,      Carlos Herring MD    Please note this report has been partially produced using speech recognition software and may cause contain errors related to that system including grammar, punctuation and spelling or words and phrases that may not seem appropriate. If there are questions or concerns please feel free to contact me to clarify. Note: A total time of 35 mins. was spent on the date of service in preparation for this visit, which included face-to-face patient care, completing clinical documentation, counseling and coordination of care based on clinical impression, neurologic diagnosis, review of pertinent imaging studies, test results, implementation and discussion of treatment plan, risk factor reduction and patient and/or family education.     Orders Placed This Encounter   Procedures    MRI BRAIN WO CONTRAST     Order Specific Question:   Reason for exam:     Answer:   evaluate for infarct, mass,  hemorrhage    External Referral To Neurology     Referral Priority:   Routine     Referral Type:   Eval and Treat     Referral Reason:   Specialty Services Required     Referral Location:   [facility]     Requested Specialty:   Neurology     Number of Visits Requested:   1

## 2022-02-09 ENCOUNTER — OFFICE VISIT (OUTPATIENT)
Dept: ENT CLINIC | Age: 37
End: 2022-02-09
Payer: COMMERCIAL

## 2022-02-09 VITALS
HEART RATE: 55 BPM | HEIGHT: 68 IN | BODY MASS INDEX: 28.04 KG/M2 | DIASTOLIC BLOOD PRESSURE: 78 MMHG | SYSTOLIC BLOOD PRESSURE: 120 MMHG | WEIGHT: 185 LBS

## 2022-02-09 DIAGNOSIS — J31.0 RHINITIS, CHRONIC: ICD-10-CM

## 2022-02-09 DIAGNOSIS — J32.9 CHRONIC SINUSITIS, UNSPECIFIED LOCATION: Primary | ICD-10-CM

## 2022-02-09 PROCEDURE — G8427 DOCREV CUR MEDS BY ELIG CLIN: HCPCS | Performed by: OTOLARYNGOLOGY

## 2022-02-09 PROCEDURE — G8482 FLU IMMUNIZE ORDER/ADMIN: HCPCS | Performed by: OTOLARYNGOLOGY

## 2022-02-09 PROCEDURE — 99213 OFFICE O/P EST LOW 20 MIN: CPT | Performed by: OTOLARYNGOLOGY

## 2022-02-09 PROCEDURE — 1036F TOBACCO NON-USER: CPT | Performed by: OTOLARYNGOLOGY

## 2022-02-09 PROCEDURE — G8417 CALC BMI ABV UP PARAM F/U: HCPCS | Performed by: OTOLARYNGOLOGY

## 2022-02-09 ASSESSMENT — ENCOUNTER SYMPTOMS
RHINORRHEA: 0
SINUS PRESSURE: 0
COUGH: 0
VOMITING: 0
SHORTNESS OF BREATH: 0

## 2022-02-09 NOTE — PROGRESS NOTES
[facility] Otolaryngology  Dr. Gino Norris. Thiago Castillo. Ms.Ed        Patient Name:  Heidi Lawton  :  1985     CHIEF C/O:    Chief Complaint   Patient presents with    Follow-up     1mo sinus       HISTORY OBTAINED FROM:  patient    HISTORY OF PRESENT ILLNESS:       Stephanie Jeter is a 31y.o. year old female, here today for follow up of chronic sinusitis; underwent CT. Still using flonase and astelin and doing well. CT scan reviewed with the patient today, no current complaints of epistaxis headaches or vision changes, feels like nasal congestion and rhinitis are improving. No complaints of new hoarseness shortness of breath stridor headaches or vision changes. CT scan does not reveal any significant pathological changes to the maxillary ethmoid sphenoid and frontal sinuses at this time, the inflammatory changes of the nose itself.       Past Medical History:   Diagnosis Date    ADHD (attention deficit hyperactivity disorder)     Complication of anesthesia     states spinal did not last entire case    Ethmoid sinusitis 2020    History of migraine 2020    Migraine without aura and without status migrainosus, not intractable 2020    Postpartum depression     with first child    Thrombophilia ([facility] [address]      Past Surgical History:   Procedure Laterality Date    ABDOMEN SURGERY       section x 2     SECTION      CHOLECYSTECTOMY         Current Outpatient Medications:     Erenumab-aooe 70 MG/ML SOAJ, Inject 140 mg into the skin every 30 days Inject one dose subcutaneously every 30 days, Disp: 1 pen, Rfl: 11    topiramate (TOPAMAX) 50 MG tablet, Take one twice daily or two at bedtime, Disp: 60 tablet, Rfl: 5    Levonorgestrel (MIRENA, 52 MG, IU), by IntraUTERine route, Disp: , Rfl:     loratadine (CLARITIN) 10 MG tablet, Take 10 mg by mouth daily, Disp: , Rfl:     azelastine (ASTELIN) 0.1 % nasal spray, 2 sprays by Nasal route 2 times daily Use in each nostril as directed, Disp: 1 Bottle, Rfl: 1    prochlorperazine (COMPAZINE) 5 MG tablet, Take 1 tablet by mouth every 8 hours as needed for Nausea, Disp: 120 tablet, Rfl: 1    fluticasone (FLONASE) 50 MCG/ACT nasal spray, 1 spray by Each Nostril route daily, Disp: 2 Bottle, Rfl: 1    SUMAtriptan (IMITREX) 100 MG tablet, Take 1 tablet by mouth once as needed for Migraine, Disp: 9 tablet, Rfl: 5  Vyvanse [lisdexamfetamine]  Social History     Tobacco Use    Smoking status: Former Smoker     Types: Cigarettes     Quit date: 2017     Years since quittin.1    Smokeless tobacco: Never Used   Vaping Use    Vaping Use: Never used   Substance Use Topics    Alcohol use: No    Drug use: No     Family History   Problem Relation Age of Onset    High Blood Pressure Mother     High Cholesterol Mother     High Cholesterol Father     High Blood Pressure Father     High Cholesterol Brother     High Blood Pressure Brother     Diabetes Maternal Grandmother     High Blood Pressure Maternal Grandmother     High Cholesterol Maternal Grandmother     Heart Disease Maternal Grandfather     High Cholesterol Maternal Grandfather     High Blood Pressure Maternal Grandfather     Cancer Paternal Grandfather        Review of Systems   Constitutional: Negative for chills and fever. HENT: Negative for congestion, ear discharge, hearing loss, postnasal drip, rhinorrhea, sinus pressure and tinnitus. Respiratory: Negative for cough and shortness of breath. Cardiovascular: Negative for chest pain and palpitations. Gastrointestinal: Negative for vomiting. Skin: Negative for rash. Allergic/Immunologic: Negative for environmental allergies. Neurological: Negative for dizziness and headaches. Hematological: Does not bruise/bleed easily. All other systems reviewed and are negative.       /78 (Site: Right Upper Arm, Position: Sitting, Cuff Size: Medium Adult)   Pulse 55   Ht 5' 8\" (1.727 m)   Wt 185 lb (83.9 kg)   LMP  (LMP Unknown)   BMI 28.13 kg/m²   Physical Exam  Vitals and nursing note reviewed. Constitutional:       Appearance: She is well-developed. HENT:      Head: Normocephalic and atraumatic. No contusion, masses or laceration. Jaw: No tenderness or swelling. Right Ear: Tympanic membrane, ear canal and external ear normal. No decreased hearing noted. There is no impacted cerumen. Left Ear: Tympanic membrane, ear canal and external ear normal. No decreased hearing noted. There is no impacted cerumen. Nose: Septal deviation present. No congestion or rhinorrhea. Right Nostril: No epistaxis. Left Nostril: No epistaxis. Right Turbinates: Not enlarged. Left Turbinates: Not enlarged. Mouth/Throat:      Mouth: Mucous membranes are moist. No oral lesions. Dentition: No gum lesions. Pharynx: No oropharyngeal exudate or posterior oropharyngeal erythema. Eyes:      Pupils: Pupils are equal, round, and reactive to light. Neck:      Thyroid: No thyromegaly. Trachea: No tracheal deviation. Cardiovascular:      Rate and Rhythm: Normal rate. Pulmonary:      Effort: Pulmonary effort is normal. No respiratory distress. Musculoskeletal:         General: Normal range of motion. Cervical back: Normal range of motion. Lymphadenopathy:      Cervical: No cervical adenopathy. Skin:     General: Skin is warm. Findings: No erythema. Neurological:      Mental Status: She is alert. Cranial Nerves: No cranial nerve deficit. IMPRESSION/PLAN:  Continue flonase and astlein  Nasal saline  Follow up in 6 months    Dr. Selene Diane.  Otolaryngology Facial Plastic Surgery  :  [address] [place] [address] Otolaryngology/Facial Plastic Surgery Residency  Associate Clinical Professor:  Paramjit Louis, [facility]

## 2022-02-17 ENCOUNTER — HOSPITAL ENCOUNTER (EMERGENCY)
Age: 37
Discharge: HOME | End: 2022-02-17
Attending: EMERGENCY MEDICINE
Payer: COMMERCIAL

## 2022-02-17 ENCOUNTER — TELEPHONE (OUTPATIENT)
Dept: NEUROLOGY | Age: 37
End: 2022-02-17

## 2022-02-17 ENCOUNTER — APPOINTMENT (OUTPATIENT)
Dept: MRI IMAGING | Age: 37
End: 2022-02-17
Payer: COMMERCIAL

## 2022-02-17 VITALS
SYSTOLIC BLOOD PRESSURE: 108 MMHG | DIASTOLIC BLOOD PRESSURE: 57 MMHG | TEMPERATURE: 98.3 F | RESPIRATION RATE: 18 BRPM | HEART RATE: 77 BPM | OXYGEN SATURATION: 97 %

## 2022-02-17 DIAGNOSIS — G43.909 MIGRAINE WITHOUT STATUS MIGRAINOSUS, NOT INTRACTABLE, UNSPECIFIED MIGRAINE TYPE: Primary | ICD-10-CM

## 2022-02-17 LAB
HCG, URINE, POC: NEGATIVE
Lab: NORMAL
NEGATIVE QC PASS/FAIL: NORMAL
POSITIVE QC PASS/FAIL: NORMAL

## 2022-02-17 PROCEDURE — 96374 THER/PROPH/DIAG INJ IV PUSH: CPT

## 2022-02-17 PROCEDURE — 6370000000 HC RX 637 (ALT 250 FOR IP): Performed by: EMERGENCY MEDICINE

## 2022-02-17 PROCEDURE — 2580000003 HC RX 258: Performed by: EMERGENCY MEDICINE

## 2022-02-17 PROCEDURE — 70551 MRI BRAIN STEM W/O DYE: CPT

## 2022-02-17 PROCEDURE — 6360000002 HC RX W HCPCS: Performed by: EMERGENCY MEDICINE

## 2022-02-17 PROCEDURE — 99284 EMERGENCY DEPT VISIT MOD MDM: CPT

## 2022-02-17 PROCEDURE — 96375 TX/PRO/DX INJ NEW DRUG ADDON: CPT

## 2022-02-17 RX ORDER — BUTALBITAL, ACETAMINOPHEN AND CAFFEINE 50; 325; 40 MG/1; MG/1; MG/1
1 TABLET ORAL ONCE
Status: COMPLETED | OUTPATIENT
Start: 2022-02-17 | End: 2022-02-17

## 2022-02-17 RX ORDER — METOCLOPRAMIDE HYDROCHLORIDE 5 MG/ML
10 INJECTION INTRAMUSCULAR; INTRAVENOUS ONCE
Status: COMPLETED | OUTPATIENT
Start: 2022-02-17 | End: 2022-02-17

## 2022-02-17 RX ORDER — DIPHENHYDRAMINE HYDROCHLORIDE 50 MG/ML
25 INJECTION INTRAMUSCULAR; INTRAVENOUS ONCE
Status: COMPLETED | OUTPATIENT
Start: 2022-02-17 | End: 2022-02-17

## 2022-02-17 RX ORDER — 0.9 % SODIUM CHLORIDE 0.9 %
1000 INTRAVENOUS SOLUTION INTRAVENOUS ONCE
Status: COMPLETED | OUTPATIENT
Start: 2022-02-17 | End: 2022-02-17

## 2022-02-17 RX ADMIN — DIPHENHYDRAMINE HYDROCHLORIDE 25 MG: 50 INJECTION INTRAMUSCULAR; INTRAVENOUS at 13:46

## 2022-02-17 RX ADMIN — SODIUM CHLORIDE 1000 ML: 9 INJECTION, SOLUTION INTRAVENOUS at 13:47

## 2022-02-17 RX ADMIN — METOCLOPRAMIDE 10 MG: 5 INJECTION, SOLUTION INTRAMUSCULAR; INTRAVENOUS at 13:45

## 2022-02-17 RX ADMIN — BUTALBITAL, ACETAMINOPHEN, AND CAFFEINE 1 TABLET: 50; 325; 40 TABLET ORAL at 13:45

## 2022-02-17 ASSESSMENT — ENCOUNTER SYMPTOMS
VOMITING: 0
SHORTNESS OF BREATH: 0
RHINORRHEA: 0
PHOTOPHOBIA: 1
BACK PAIN: 0
ABDOMINAL PAIN: 0
COUGH: 0
NAUSEA: 1

## 2022-02-17 ASSESSMENT — PAIN DESCRIPTION - LOCATION: LOCATION: HEAD

## 2022-02-17 ASSESSMENT — PAIN SCALES - GENERAL
PAINLEVEL_OUTOF10: 7
PAINLEVEL_OUTOF10: 6

## 2022-02-17 ASSESSMENT — PAIN DESCRIPTION - PAIN TYPE: TYPE: ACUTE PAIN

## 2022-02-17 NOTE — ED PROVIDER NOTES
Patient with a known history of complex migraines presents to the ED for evaluation of a headache. She states that this headache is not as bad as her usual migraines. She does follow with a neurologist for these migraines. Was recently started on new medication which she states has made them slightly more mild than usual.  Symptoms consist of occasional numbness in the extremities as well as occasional mild left facial droop. She also reports pain in the frontal region. Pain is moderate in severity. She has associated photophobia and nausea but no vomiting. She does complain of some neck pain discomfort which also occurs with her migraines. Denies any focal weakness. Denies any head trauma. States she did take 200 mg of Imitrex today total and this is only provided mild relief. Symptoms have been persistent and are moderate in severity. Patient was scheduled for an MRI today by her neurologist.           Review of Systems   Constitutional: Negative for chills, diaphoresis, fatigue and fever. HENT: Negative for congestion and rhinorrhea. Eyes: Positive for photophobia. Negative for visual disturbance. Respiratory: Negative for cough and shortness of breath. Cardiovascular: Negative for chest pain and palpitations. Gastrointestinal: Positive for nausea. Negative for abdominal pain and vomiting. Genitourinary: Positive for vaginal bleeding ( Currently on her menses. Migraines are cyclical with her periods). Negative for difficulty urinating, hematuria and vaginal discharge. Musculoskeletal: Positive for neck pain. Negative for back pain and gait problem. Skin: Negative for pallor and rash. Neurological: Positive for dizziness, facial asymmetry, numbness and headaches. Negative for syncope and light-headedness. Hematological: Does not bruise/bleed easily. Psychiatric/Behavioral: Negative for confusion. All other systems reviewed and are negative.        Physical Exam  Vitals and nursing note reviewed. Constitutional:       General: She is not in acute distress. Appearance: She is well-developed and normal weight. She is not diaphoretic. Comments: Patient lying in bed and appears to be in no acute distress. HENT:      Head: Normocephalic and atraumatic. Eyes:      Extraocular Movements: Extraocular movements intact. Conjunctiva/sclera: Conjunctivae normal.   Cardiovascular:      Rate and Rhythm: Normal rate and regular rhythm. Heart sounds: Normal heart sounds. No murmur heard. Pulmonary:      Effort: Pulmonary effort is normal. No respiratory distress. Breath sounds: Normal breath sounds. No wheezing or rales. Abdominal:      General: Bowel sounds are normal.      Palpations: Abdomen is soft. Tenderness: There is no abdominal tenderness. There is no guarding or rebound. Musculoskeletal:      Cervical back: Normal range of motion and neck supple. No rigidity ( No findings of meningismus). Comments: Muscle strength 5 out of 5 in both the upper and lower extremities. Skin:     General: Skin is warm and dry. Coloration: Skin is not pale. Findings: No rash. Neurological:      Mental Status: She is alert and oriented to person, place, and time. Cranial Nerves: No cranial nerve deficit. Sensory: Sensory deficit ( Patient does have subjective numbness on the left upper and lower extremity as compared to the right.) present. Coordination: Coordination normal.      Comments: Subtle loss of the nasolabial fold on the left. Procedures     MDM   Presents to the ED with a migraine headache. History of migraines. She gets occasional neurologic symptoms including numbness and facial droop with it as well. She was scheduled to have MRI done today by her neurologist.  We are able to get the MRI done in the ED and it showed unremarkable MRI of the brain. No findings to account for patient's headaches.   There were scattered paranasal sinus disease. I did discuss all the findings with the patient. She is feeling better after being given Reglan, Benadryl, and Fioricet. She is comfortably discharged home and will follow up with her neurologist to go over the MRI as well. She understands that if she has worsening symptoms or new concerns that she can return to the ED for further evaluation. ED Course as of 22 1413   Thu 2022   1155 Met with the MRI tech and patient can get the MRI in the ED if I placed the order. [MS]      ED Course User Index  [MS] Ludivina Kyle, DO       --------------------------------------------- PAST HISTORY ---------------------------------------------  Past Medical History:  has a past medical history of ADHD (attention deficit hyperactivity disorder), Complication of anesthesia, Ethmoid sinusitis, History of migraine, Migraine without aura and without status migrainosus, not intractable, Postpartum depression, and Thrombophilia ([facility] [address] Past Surgical History:  has a past surgical history that includes  section; Cholecystectomy; and Abdomen surgery. Social History:  reports that she quit smoking about 4 years ago. Her smoking use included cigarettes. She has never used smokeless tobacco. She reports that she does not drink alcohol and does not use drugs. Family History: family history includes Cancer in her paternal grandfather; Diabetes in her maternal grandmother; Heart Disease in her maternal grandfather; High Blood Pressure in her brother, father, maternal grandfather, maternal grandmother, and mother; High Cholesterol in her brother, father, maternal grandfather, maternal grandmother, and mother. The patients home medications have been reviewed.     Allergies: Vyvanse [lisdexamfetamine] and Toradol [ketorolac tromethamine]    -------------------------------------------------- RESULTS -------------------------------------------------  Labs:  Results for orders placed or performed during the hospital encounter of 02/17/22   POC Pregnancy Urine   Result Value Ref Range    HCG, Urine, POC Negative Negative    Lot Number [id number]     Positive QC Pass/Fail Pass     Negative QC Pass/Fail Pass        Radiology:  MRI BRAIN WO CONTRAST   Final Result   1. Unremarkable MRI of the brain. No findings to account for the patient's   headaches. 2. Scattered paranasal sinus disease with greatest involvement in the   posterior left ethmoid air cells, similar to the prior exam.  There are   associated proteinaceous mucous retention cysts again noted. ------------------------- NURSING NOTES AND VITALS REVIEWED ---------------------------  Date / Time Roomed:  2/17/2022 11:37 AM  ED Bed Assignment:  05/05    The nursing notes within the ED encounter and vital signs as below have been reviewed. /63   Pulse 57   Temp 98.3 °F (36.8 °C) (Oral)   Resp 18   LMP  (LMP Unknown)   SpO2 99%   Oxygen Saturation Interpretation: Normal      ------------------------------------------ PROGRESS NOTES ------------------------------------------  I have spoken with the patient and discussed todays results, in addition to providing specific details for the plan of care and counseling regarding the diagnosis and prognosis. Their questions are answered at this time and they are agreeable with the plan. I discussed at length with them reasons for immediate return here for re evaluation. They will followup with primary care by calling their office tomorrow. --------------------------------- ADDITIONAL PROVIDER NOTES ---------------------------------  At this time the patient is without objective evidence of an acute process requiring hospitalization or inpatient management. They have remained hemodynamically stable throughout their entire ED visit and are stable for discharge with outpatient follow-up.      The plan has been discussed in detail and they are aware of the specific conditions for emergent return, as well as the importance of follow-up. New Prescriptions    No medications on file       Diagnosis:  1. Migraine without status migrainosus, not intractable, unspecified migraine type        Disposition:  Patient's disposition: Discharge to home  Patient's condition is stable.          Mitch Bryant DO  02/17/22 1413

## 2022-02-17 NOTE — ED NOTES
Pt feels improved pain 4/10 GCS 15 re-evaluation and review of findings discussed w / pt by ED provider     Mariza Gillis RN  02/17/22 2119

## 2022-02-17 NOTE — TELEPHONE ENCOUNTER
Patient called stating she will be going to [facility] for her lip drooping and left sided tingling/weakness regarding her migraines. FYI just in case Dr. Nasir England needs to call the ER.   Electronically signed by Teodora Nagel on 2/17/22 at 11:53 AM EST

## 2022-02-18 ENCOUNTER — TELEPHONE (OUTPATIENT)
Dept: NEUROLOGY | Age: 37
End: 2022-02-18

## 2022-03-15 ENCOUNTER — NURSE TRIAGE (OUTPATIENT)
Dept: OTHER | Facility: CLINIC | Age: 37
End: 2022-03-15

## 2022-03-15 NOTE — TELEPHONE ENCOUNTER
Received call from [facility] with [email or web address]. Subjective: Caller states \"I have all these symptoms that come together and I have had them for last year. \"     Current Symptoms: Left leg pain, dizziness    Onset: \"it's been quite awhile\"- at least a year- worsening in the last month    Associated Symptoms: Dizziness, SOB associated with pain    Pain Severity: Bilateral leg pain: 7/10   Temperature: N/A    What has been tried: Support hose- mildly help pain    LMP: Mirena IUD Pregnant: No    Recommended disposition: See PCP within 3 Days    Care advice provided, patient verbalizes understanding; denies any other questions or concerns; instructed to call back for any new or worsening symptoms. Patient/Caller agrees with recommended disposition; writer provided warm transfer to [place] [facility] for appointment scheduling     Attention Provider: Thank you for allowing me to participate in the care of your patient. The patient was connected to triage in response to information provided to the ECC/PSC. Please do not respond through this encounter as the response is not directed to a shared pool.     Reason for Disposition   MODERATE pain (e.g., interferes with normal activities, limping) and present > 3 days    Protocols used: LEG PAIN-ADULT-[place]

## 2022-03-16 ENCOUNTER — OFFICE VISIT (OUTPATIENT)
Dept: FAMILY MEDICINE CLINIC | Age: 37
End: 2022-03-16
Payer: COMMERCIAL

## 2022-03-16 VITALS
SYSTOLIC BLOOD PRESSURE: 112 MMHG | WEIGHT: 186.3 LBS | HEIGHT: 68 IN | DIASTOLIC BLOOD PRESSURE: 70 MMHG | TEMPERATURE: 97.6 F | OXYGEN SATURATION: 98 % | HEART RATE: 83 BPM | BODY MASS INDEX: 28.23 KG/M2

## 2022-03-16 DIAGNOSIS — G89.29 CHRONIC PAIN OF BOTH FEET: ICD-10-CM

## 2022-03-16 DIAGNOSIS — R20.0 NUMBNESS IN FEET: ICD-10-CM

## 2022-03-16 DIAGNOSIS — M79.672 CHRONIC PAIN OF BOTH FEET: ICD-10-CM

## 2022-03-16 DIAGNOSIS — E78.5 HYPERLIPIDEMIA, UNSPECIFIED HYPERLIPIDEMIA TYPE: Primary | ICD-10-CM

## 2022-03-16 DIAGNOSIS — M79.671 CHRONIC PAIN OF BOTH FEET: ICD-10-CM

## 2022-03-16 DIAGNOSIS — Z13.31 POSITIVE DEPRESSION SCREENING: ICD-10-CM

## 2022-03-16 PROCEDURE — 1036F TOBACCO NON-USER: CPT | Performed by: INTERNAL MEDICINE

## 2022-03-16 PROCEDURE — G8417 CALC BMI ABV UP PARAM F/U: HCPCS | Performed by: INTERNAL MEDICINE

## 2022-03-16 PROCEDURE — G8427 DOCREV CUR MEDS BY ELIG CLIN: HCPCS | Performed by: INTERNAL MEDICINE

## 2022-03-16 PROCEDURE — 99214 OFFICE O/P EST MOD 30 MIN: CPT | Performed by: INTERNAL MEDICINE

## 2022-03-16 PROCEDURE — G8482 FLU IMMUNIZE ORDER/ADMIN: HCPCS | Performed by: INTERNAL MEDICINE

## 2022-03-16 RX ORDER — ZONISAMIDE 25 MG/1
100 CAPSULE ORAL DAILY
COMMUNITY
Start: 2022-02-23 | End: 2022-08-11

## 2022-03-16 RX ORDER — NARATRIPTAN 2.5 MG/1
2.5 TABLET ORAL PRN
COMMUNITY
Start: 2022-02-23

## 2022-03-16 SDOH — ECONOMIC STABILITY: FOOD INSECURITY: WITHIN THE PAST 12 MONTHS, THE FOOD YOU BOUGHT JUST DIDN'T LAST AND YOU DIDN'T HAVE MONEY TO GET MORE.: NEVER TRUE

## 2022-03-16 SDOH — ECONOMIC STABILITY: FOOD INSECURITY: WITHIN THE PAST 12 MONTHS, YOU WORRIED THAT YOUR FOOD WOULD RUN OUT BEFORE YOU GOT MONEY TO BUY MORE.: NEVER TRUE

## 2022-03-16 ASSESSMENT — PATIENT HEALTH QUESTIONNAIRE - PHQ9
SUM OF ALL RESPONSES TO PHQ QUESTIONS 1-9: 15
SUM OF ALL RESPONSES TO PHQ QUESTIONS 1-9: 15
3. TROUBLE FALLING OR STAYING ASLEEP: 1
9. THOUGHTS THAT YOU WOULD BE BETTER OFF DEAD, OR OF HURTING YOURSELF: 0
8. MOVING OR SPEAKING SO SLOWLY THAT OTHER PEOPLE COULD HAVE NOTICED. OR THE OPPOSITE, BEING SO FIGETY OR RESTLESS THAT YOU HAVE BEEN MOVING AROUND A LOT MORE THAN USUAL: 3
1. LITTLE INTEREST OR PLEASURE IN DOING THINGS: 1
SUM OF ALL RESPONSES TO PHQ QUESTIONS 1-9: 15
6. FEELING BAD ABOUT YOURSELF - OR THAT YOU ARE A FAILURE OR HAVE LET YOURSELF OR YOUR FAMILY DOWN: 1
2. FEELING DOWN, DEPRESSED OR HOPELESS: 2
10. IF YOU CHECKED OFF ANY PROBLEMS, HOW DIFFICULT HAVE THESE PROBLEMS MADE IT FOR YOU TO DO YOUR WORK, TAKE CARE OF THINGS AT HOME, OR GET ALONG WITH OTHER PEOPLE: 2
SUM OF ALL RESPONSES TO PHQ9 QUESTIONS 1 & 2: 3
4. FEELING TIRED OR HAVING LITTLE ENERGY: 3
7. TROUBLE CONCENTRATING ON THINGS, SUCH AS READING THE NEWSPAPER OR WATCHING TELEVISION: 3
SUM OF ALL RESPONSES TO PHQ QUESTIONS 1-9: 15
5. POOR APPETITE OR OVEREATING: 1

## 2022-03-16 ASSESSMENT — ENCOUNTER SYMPTOMS
EYE PAIN: 0
SHORTNESS OF BREATH: 0
SORE THROAT: 0
BLOOD IN STOOL: 0
EYE DISCHARGE: 0
NAUSEA: 0
ABDOMINAL PAIN: 0
SINUS PAIN: 0

## 2022-03-16 ASSESSMENT — SOCIAL DETERMINANTS OF HEALTH (SDOH): HOW HARD IS IT FOR YOU TO PAY FOR THE VERY BASICS LIKE FOOD, HOUSING, MEDICAL CARE, AND HEATING?: NOT HARD AT ALL

## 2022-03-16 NOTE — PROGRESS NOTES
Chief Complaint   Patient presents with    Leg Pain     Bilateral leg and foot pain, starts in the bottom of the feet and goes up the legs. Feet feel tender, sore, achy, burning. been going on for 4 years but increasing over the last couple months       Foot Pain     bilateral foot pain    Health Maintenance     pt schedule for pap 03/24/2022 with Dr. Luis Enrique Thomas         HPI:  Patient is here for follow-up     Pt is following with Neurology Migraine clinic    Off Topamax    Has pain on both feet /lower leg for for over 4 years  Above noted      Allergy and Medications are reviewed and updated. Past Medical History, Surgical History, and Family History has been reviewed and updated. Review of Systems:  Review of Systems   Constitutional: Negative for chills and fever. HENT: Negative for congestion, sinus pain and sore throat. Eyes: Negative for pain and discharge. Respiratory: Negative for shortness of breath (No new SOb). Cardiovascular: Negative for chest pain. Gastrointestinal: Negative for abdominal pain, blood in stool and nausea. Genitourinary: Negative for flank pain and frequency. Musculoskeletal: Negative for neck pain. Hematological: Does not bruise/bleed easily. Psychiatric/Behavioral: Negative for suicidal ideas. Vitals:    03/16/22 1441   BP: 112/70   Position: Sitting   Pulse: 83   Temp: 97.6 °F (36.4 °C)   TempSrc: Temporal   SpO2: 98%   Weight: 186 lb 4.8 oz (84.5 kg)   Height: 5' 8\" (1.727 m)       Physical Exam  Vitals reviewed. Constitutional:       Appearance: She is well-developed. HENT:      Head: Normocephalic and atraumatic. Mouth/Throat:      Pharynx: No oropharyngeal exudate. Eyes:      Conjunctiva/sclera: Conjunctivae normal.      Pupils: Pupils are equal, round, and reactive to light. Neck:      Vascular: No JVD. Cardiovascular:      Rate and Rhythm: Normal rate and regular rhythm.       Pulses:           Dorsalis pedis pulses are 2+ on the right side and 2+ on the left side. Posterior tibial pulses are 2+ on the right side and 2+ on the left side. Pulmonary:      Effort: Pulmonary effort is normal.      Breath sounds: Normal breath sounds. No rales. Abdominal:      General: Bowel sounds are normal.      Palpations: Abdomen is soft. Musculoskeletal:         General: Normal range of motion. Right foot: No deformity. Left foot: No deformity. Feet:      Right foot:      Protective Sensation: 7 sites tested. 7 sites sensed. Skin integrity: No ulcer. Left foot:      Protective Sensation: 7 sites tested. 7 sites sensed. Skin integrity: No ulcer. Lymphadenopathy:      Cervical: No cervical adenopathy. Skin:     General: Skin is warm and dry. Neurological:      Mental Status: She is alert and oriented to person, place, and time. Psychiatric:         Behavior: Behavior normal.          Labs :    Lab Results   Component Value Date    WBC 6.8 2021    HGB 12.6 2021    HCT 39.8 2021     2021    HDL 38 2021    ALT 13 2021    AST 14 2021     2021    K 4.2 2021     (H) 2021    CREATININE 1.0 2021    BUN 10 2021    CO2 18 (L) 2021    TSH 0.836 2021    GLUF 105 (H) 2021    LABA1C 5.0 2020     Lab Results   Component Value Date    COLORU Yellow 2021    NITRU Negative 2021    GLUCOSEU Negative 2021    KETUA Negative 2021    UROBILINOGEN 1.0 2021    BILIRUBINUR Negative 2021             ASSESSMENT     Patient Active Problem List    Diagnosis Date Noted    History of migraine 2020    Ethmoid sinusitis 2020    Migraine without aura and without status migrainosus, not intractable 2020    Antepartum genitourinary tract infection 2013    Previous  delivery, antepartum condition or complication         Diagnosis:     ICD-10-CM    1. Hyperlipidemia, unspecified hyperlipidemia type  E78.5 Lipid, Fasting   2. Numbness in feet  R20.0 Vitamin B12 & Folate     Ambulatory referral to Podiatry     TSH     CBC with Auto Differential   3. Chronic pain of both feet  M79.671 Ambulatory referral to Podiatry    G89.29     M79.672    4. Positive depression screening  Z13.31     To F/U with Counselling services         PLAN:      Neuro note - reviewed    HA-- slowly improving    Ref to Podiatrist    Advise to have ( Fasting) lab test prior to next visit. Pt is stable on current medical treatment. Continue current treatment plan    Side effects/Adverse effects/Precautions are reviewed with patient. Low salt, Low Carb diet an low fat diet  Continue medications as advised and take them regularly  Regular exercises as advised    Discussed natural and expected course of this diagnosis and need to alert me if symptoms do not follow expected course, or if any worse. Smoking cessation if applicable, discussed with patient. Patient Instructions   The medication list included in this document is our record of what you are currently taking, including any changes that were made at today's visit.  If you find any differences when compared to your medications at home, or have any questions that were not answered at your visit, please contact the office. Return in about 6 weeks (around 4/27/2022).

## 2022-03-18 DIAGNOSIS — M79.672 LEFT FOOT PAIN: Primary | ICD-10-CM

## 2022-03-18 DIAGNOSIS — M79.671 RIGHT FOOT PAIN: ICD-10-CM

## 2022-04-04 DIAGNOSIS — E78.5 HYPERLIPIDEMIA, UNSPECIFIED HYPERLIPIDEMIA TYPE: ICD-10-CM

## 2022-04-04 DIAGNOSIS — R20.0 NUMBNESS IN FEET: ICD-10-CM

## 2022-04-04 LAB
BASOPHILS ABSOLUTE: 0.04 E9/L (ref 0–0.2)
BASOPHILS RELATIVE PERCENT: 0.6 % (ref 0–2)
CHOLESTEROL, FASTING: 177 MG/DL (ref 0–199)
EOSINOPHILS ABSOLUTE: 0.14 E9/L (ref 0.05–0.5)
EOSINOPHILS RELATIVE PERCENT: 2 % (ref 0–6)
FOLATE: 8.6 NG/ML (ref 4.8–24.2)
HCT VFR BLD CALC: 40.8 % (ref 34–48)
HDLC SERPL-MCNC: 43 MG/DL
HEMOGLOBIN: 13.1 G/DL (ref 11.5–15.5)
IMMATURE GRANULOCYTES #: 0.03 E9/L
IMMATURE GRANULOCYTES %: 0.4 % (ref 0–5)
LDL CHOLESTEROL CALCULATED: 113 MG/DL (ref 0–99)
LYMPHOCYTES ABSOLUTE: 2.03 E9/L (ref 1.5–4)
LYMPHOCYTES RELATIVE PERCENT: 29.5 % (ref 20–42)
MCH RBC QN AUTO: 29 PG (ref 26–35)
MCHC RBC AUTO-ENTMCNC: 32.1 % (ref 32–34.5)
MCV RBC AUTO: 90.3 FL (ref 80–99.9)
MONOCYTES ABSOLUTE: 0.51 E9/L (ref 0.1–0.95)
MONOCYTES RELATIVE PERCENT: 7.4 % (ref 2–12)
NEUTROPHILS ABSOLUTE: 4.14 E9/L (ref 1.8–7.3)
NEUTROPHILS RELATIVE PERCENT: 60.1 % (ref 43–80)
PDW BLD-RTO: 12.5 FL (ref 11.5–15)
PLATELET # BLD: 242 E9/L (ref 130–450)
PMV BLD AUTO: 10 FL (ref 7–12)
RBC # BLD: 4.52 E12/L (ref 3.5–5.5)
TRIGLYCERIDE, FASTING: 104 MG/DL (ref 0–149)
TSH SERPL DL<=0.05 MIU/L-ACNC: 0.8 UIU/ML (ref 0.27–4.2)
VITAMIN B-12: 458 PG/ML (ref 211–946)
VLDLC SERPL CALC-MCNC: 21 MG/DL
WBC # BLD: 6.9 E9/L (ref 4.5–11.5)

## 2022-04-19 ENCOUNTER — OFFICE VISIT (OUTPATIENT)
Dept: PODIATRY | Age: 37
End: 2022-04-19
Payer: COMMERCIAL

## 2022-04-19 VITALS — WEIGHT: 184 LBS | BODY MASS INDEX: 27.89 KG/M2 | HEIGHT: 68 IN

## 2022-04-19 DIAGNOSIS — G60.9 IDIOPATHIC NEUROPATHY: Primary | ICD-10-CM

## 2022-04-19 DIAGNOSIS — R26.2 DIFFICULTY WALKING: ICD-10-CM

## 2022-04-19 DIAGNOSIS — M79.662 PAIN IN BOTH LOWER LEGS: ICD-10-CM

## 2022-04-19 DIAGNOSIS — M79.661 PAIN IN BOTH LOWER LEGS: ICD-10-CM

## 2022-04-19 PROCEDURE — 99203 OFFICE O/P NEW LOW 30 MIN: CPT | Performed by: PODIATRIST

## 2022-04-19 PROCEDURE — G8427 DOCREV CUR MEDS BY ELIG CLIN: HCPCS | Performed by: PODIATRIST

## 2022-04-19 PROCEDURE — G8417 CALC BMI ABV UP PARAM F/U: HCPCS | Performed by: PODIATRIST

## 2022-04-19 NOTE — PROGRESS NOTES
22     Les Cannon    : 1985 Sex: female   Age: 31 y.o. Patient was referred by: Abilio Ricks. Nirmala Mlelo MD  Patient's PCP/Provider is:  Carlita Escobedo MD    Subjective:    Patient seen today for evaluation regarding paresthesias into both lower extremities for greater than 1 year duration. Chief Complaint   Patient presents with    Foot Pain     bilateral foot        HPI: Patient stated that she does get cramping with associated burning sensations into both feet which does travel proximally into the thigh and lower back region. Patient was a  for 10+ years prior to her current profession as a home health nurse. Patient denies any recent injury or changes in activities. Patient presented today to discuss further treatment options available. ROS:  Const: Positives and pertinent negatives as per HPI. Musculo: Denies symptoms other than stated above. Neuro: Denies symptoms other than stated above. Skin: Denies symptoms other than stated above. Current Medications:    Current Outpatient Medications:     zonisamide (ZONEGRAN) 25 MG capsule, Take 100 mg by mouth daily , Disp: , Rfl:     naratriptan (AMERGE) 2.5 MG tablet, Take 2.5 mg by mouth as needed, Disp: , Rfl:     Erenumab-aooe 70 MG/ML SOAJ, Inject 140 mg into the skin every 30 days Inject one dose subcutaneously every 30 days, Disp: 1 pen, Rfl: 11    Levonorgestrel (MIRENA, 52 MG, IU), by IntraUTERine route, Disp: , Rfl:     loratadine (CLARITIN) 10 MG tablet, Take 10 mg by mouth daily, Disp: , Rfl:     azelastine (ASTELIN) 0.1 % nasal spray, 2 sprays by Nasal route 2 times daily Use in each nostril as directed, Disp: 1 Bottle, Rfl: 1    prochlorperazine (COMPAZINE) 5 MG tablet, Take 1 tablet by mouth every 8 hours as needed for Nausea, Disp: 120 tablet, Rfl: 1    fluticasone (FLONASE) 50 MCG/ACT nasal spray, 1 spray by Each Nostril route daily, Disp: 2 Bottle, Rfl: 1    Allergies:   Allergies   Allergen Reactions    Vyvanse [Lisdexamfetamine] Anxiety    Toradol [Ketorolac Tromethamine] Other (See Comments)     Pt states she gets very restless       Vitals:    22 1214   Weight: 184 lb (83.5 kg)   Height: 5' 8\" (1.727 m)        Past Medical History:   Diagnosis Date    ADHD (attention deficit hyperactivity disorder)     Complication of anesthesia     states spinal did not last entire case    Ethmoid sinusitis 2020    History of migraine 2020    Migraine without aura and without status migrainosus, not intractable 2020    Postpartum depression     with first child    Thrombophilia ([facility] [address]      Family History   Problem Relation Age of Onset    High Blood Pressure Mother     High Cholesterol Mother     High Cholesterol Father     High Blood Pressure Father     High Cholesterol Brother     High Blood Pressure Brother     Diabetes Maternal Grandmother     High Blood Pressure Maternal Grandmother     High Cholesterol Maternal Grandmother     Heart Disease Maternal Grandfather     High Cholesterol Maternal Grandfather     High Blood Pressure Maternal Grandfather     Cancer Paternal Grandfather      Past Surgical History:   Procedure Laterality Date    ABDOMEN SURGERY       section x 2     SECTION      CHOLECYSTECTOMY       Social History     Tobacco Use    Smoking status: Former Smoker     Types: Cigarettes     Quit date: 2017     Years since quittin.3    Smokeless tobacco: Never Used   Vaping Use    Vaping Use: Never used   Substance Use Topics    Alcohol use: No    Drug use: No           Diagnostic studies:    No results found. Procedures:    None    Exam:  VASCULAR: Pedal pulses palpable bilateral foot. Capillary refill time brisk digits 1 through 5 bilateral foot. NEUROLOGICAL: Mild paresthesias noted into both lower extremities  DERMATOLOGICAL: No edema or ecchymotic skin changes present bilateral lower extremities.   No plantar calluses or heel fissuring noted bilateral foot. MUSCULOSKELETAL: Mild tenderness into the plantar arch regions bilaterally. Stable gait noted upon evaluation. Plan Per Assessment  Greer Maldonado was seen today for foot pain. Diagnoses and all orders for this visit:    Idiopathic neuropathy  -     EMG; Future    Pain in both lower legs  -     EMG; Future    Difficulty walking  -     EMG; Future        1. New patient consultation and management  2. We did review radiographs with patient in detail today bilateral foot. No acute osseous abnormalities noted. 3. We did recommend obtaining EMG studies bilateral lower extremities to rule out possible radiculopathy issues as the cause of her chronic lower extremity symptoms. 4. Patient was advised on utilizing oral supplement to help with some of the daily cramping issues. 5. Patient will be followed up once the EMG is performed and reviewed. We will discuss further treatment options available at that time. Seen By:    Harrison Patrick DPM    Electronically signed by Harrison Patrick DPM on 4/19/2022 at 12:29 PM      This note was created using voice recognition software. The note was reviewed however may contain grammatical errors.

## 2022-04-19 NOTE — LETTER
[address] [place] [address] [place]   Phone: [phone]  Fax: [phone]    Miki Bejarano     Cuba Thornton  [id number]   1985 4/19/2022      Dear Dr. Jaycob Patel,    I would like to thank you for the kind referral of Cuba Thornton. She presented to the office today for evaluation regarding chronic paresthesias and cramping into both lower extremities. We did review recent radiographs with patient today. We did recommend obtaining an EMG study to rule out possible radicular issues as the cause of her chronic lower extremity symptoms. We will discuss test results, and further treatment options available. We will have continued follow-up until issues are improved. If you should have any questions concerning her visit today, please do not hesitate to contact me.     Sincerely,    Harrison Patrick DPM

## 2022-04-19 NOTE — PROGRESS NOTES
Patient is in today for evaluation of bilateral foot pain. Patient says she is having pain and burning in both feet.  pcp is Sheyla Talavera MD  Last ov 3/16/22

## 2022-04-27 ENCOUNTER — OFFICE VISIT (OUTPATIENT)
Dept: PHYSICAL MEDICINE AND REHAB | Age: 37
End: 2022-04-27
Payer: COMMERCIAL

## 2022-04-27 VITALS — HEIGHT: 68 IN | BODY MASS INDEX: 27.89 KG/M2 | WEIGHT: 184 LBS

## 2022-04-27 DIAGNOSIS — G60.9 IDIOPATHIC NEUROPATHY: ICD-10-CM

## 2022-04-27 DIAGNOSIS — M79.662 PAIN IN BOTH LOWER LEGS: ICD-10-CM

## 2022-04-27 DIAGNOSIS — M79.661 PAIN IN BOTH LOWER LEGS: ICD-10-CM

## 2022-04-27 DIAGNOSIS — R26.2 DIFFICULTY WALKING: ICD-10-CM

## 2022-04-27 PROCEDURE — G8417 CALC BMI ABV UP PARAM F/U: HCPCS | Performed by: PHYSICAL MEDICINE & REHABILITATION

## 2022-04-27 PROCEDURE — 99243 OFF/OP CNSLTJ NEW/EST LOW 30: CPT | Performed by: PHYSICAL MEDICINE & REHABILITATION

## 2022-04-27 PROCEDURE — 95909 NRV CNDJ TST 5-6 STUDIES: CPT | Performed by: PHYSICAL MEDICINE & REHABILITATION

## 2022-04-27 PROCEDURE — G8428 CUR MEDS NOT DOCUMENT: HCPCS | Performed by: PHYSICAL MEDICINE & REHABILITATION

## 2022-04-27 PROCEDURE — 95886 MUSC TEST DONE W/N TEST COMP: CPT | Performed by: PHYSICAL MEDICINE & REHABILITATION

## 2022-04-27 NOTE — PROGRESS NOTES
[facility]  Electrodiagnostic Laboratory  *Accredited by the [address] [place] [address] with exemplary status  [address] [facility]  Phone: [phone]  Fax: [phone]      Date of Examination: 22  Patient Name: Nikita Olivas  is a 31y.o. year old female who was seen today regarding   Chief Complaint   Patient presents with    Extremity Pain     bilateral foot pain described as cramping and burning that radiates up the legs.  Numbness     intense tingle in the feet    Extremity Weakness     none   . The symptoms started after no injury. The symptoms are constant. Previous workup has included: none. Past Medical History:   Diagnosis Date    ADHD (attention deficit hyperactivity disorder)     Complication of anesthesia     states spinal did not last entire case    Ethmoid sinusitis 2020    History of migraine 2020    Migraine without aura and without status migrainosus, not intractable 2020    Postpartum depression     with first child    Thrombophilia ([facility] [address]        Past Surgical History:   Procedure Laterality Date    ABDOMEN SURGERY       section x 2     SECTION      CHOLECYSTECTOMY         There is not family history of neuromuscular conditions. ROS: There has been no associated vision change, hearing change, speech abnormality, swallowing abnormality, or bowel or bladder dysfunction. Physical Exam: General: The patient is in no apparent distress. Height 5' 8\" (1.727 m), weight 184 lb (83.5 kg). MSK: There is no joint effusion, deformity, instability, swelling, erythema or warmth. AROM is full in the spine and extremities. SLR is negative. Neurologic:  No focal sensorimotor deficit. Reflexes 2+ and symmetric. Gait is normal.    Impression:     1. Idiopathic neuropathy    2. Pain in both lower legs    3. Difficulty walking        Plan:   · EMG is indicated to evaluate the above diagnosis. Orders Placed This Encounter   Procedures    MI MOTOR &/SENS 5-6 NRV CNDJ PRECONF ELTRODE LIMB    MI NEEDLE EMG EA EXTREMTY W/PARASPINL AREA COMPLETE     · EMG was done today and showed  A normal study. However, after needle insertion in the right extensor hallucis longus the patient became vasovagal and there was a brief loss of consciousness. She felt nauseated, dizzy and incoherent. Skin was clammy and cold and initial BP was undetectable. Rapid response team was called and the EMG was discontinued at this point. This report is therefore incomplete. Please refer to RRT documentation for further information patient was handed off to their care at this time. The patient was educated about the diagnosis and the prognosis. · Recommend correlation with lumbar imaging for possible stenosis. If planning to attempt to complete the EMG recommend pre-medication with anti-anxiety medications. · Advised patient to follow up with referring provider. Thank you for allowing me to participate in the care of your patient.       Sincerely,     Paulina Corbin DO

## 2022-04-27 NOTE — PROGRESS NOTES
[address] [facility]  Electrodiagnostic Laboratory  *Accredited by the [address] [place] [address] with exemplary status  [address] [facility] [address]  Phone: [phone]  Fax: [phone]    Referring Provider: Danielle Valiente.*  Primary Care Physician: Josse Freed MD  Patient Name: Don Gibbs  Patient YOB: 1985  Gender: female  BMI: Body mass index is 27.98 kg/m². Height 5' 8\" (1.727 m), weight 184 lb (83.5 kg). 4/27/2022    Reason for Referral: lower extremity idiopathic neuropathy    Description of clinical problem:   Chief Complaint   Patient presents with    Extremity Pain     bilateral foot pain described as cramping and burning that radiates up the legs.  Numbness     intense tingle in the feet    Extremity Weakness     none   Sensory NCS      Nerve / Sites Rec. Site Peak Lat PP Amp Segments Distance Velocity Temp. ms µV  cm m/s °C   L Sural - Ankle (Calf)      Calf Ankle 3.75 10.1 Calf - Ankle 14 52 31.2   L Superficial peroneal - Ankle      Lat leg Ankle 2.92 6.0 Lat leg - Ankle 10 48 31       Motor NCS      Nerve / Sites Muscle Onset Amplitude Segments Distance Velocity Temp.     ms mV  cm m/s °C   L Peroneal - EDB      Ankle EDB 5.16 9.4 Ankle - EDB 8  31      Fib head EDB 11.41 6.9 Fib head - Ankle 29 46 24.1      Above Knee EDB 13.07 6.7 Above Knee - Fib head 10 60 31   L Tibial - AH      Ankle AH 5.99 4.9 Ankle - AH 8  31.3      Pop fossa AH 13.75 2.6 Pop fossa - Ankle 43 55 31.5       F Wave      Nerve Fmin % F    ms %   L Peroneal - EDB 50.94 30   L Tibial - AH 55.94 50       H Reflex      Nerve H Lat    ms   L Tibial - Soleus 33.18   R Tibial - Soleus 33.23       EMG      EMG Summary Table     Spontaneous MUAP Recruitment   Muscle Nerve Roots IA Fib PSW Fasc Amp Dur. PPP Pattern   R. Vastus medialis Femoral L2-L4 N None None None N N N N   R. Tibialis anterior Deep peroneal (Fibular) L4-L5 N None None None N N N N   R.  Gastrocnemius (Medial head) Tibial S1-S2 N None None None N N N N   R. Extensor hallucis longus Deep peroneal (Fibular) L5-S1 N None None None N N N N      Study Limitations:  none    Summary of Findings:   Nerve conduction studies:   · All nerve conduction studies, as listed in the table were normal in latency, amplitude and conduction velocity. Needle EMG:   · Needle EMG was performed using a concentric needle. · Observed motor units were normal in amplitude, duration, phases and recruitment and no active denervation signs were seen. After needle insertion in the right extensor hallucis longus the patient became vasovagal and there was a brief loss of consciousness. She felt nauseated, dizzy and incoherent. Skin was clammy and cold and initial BP was undetectable. Rapid response team was called and the EMG was discontinued at this point. This report is therefore incomplete. Please refer to RRT documentation for further information patient was handed off to their care at this time. Diagnostic Interpretation: This study was normal within the limitations described above but is incomplete. Previous Study: There is not a prior study for comparison. Follow up EMG is recommended if clinically warranted, consider oral anti-anxiety medication if planning to complete exam. .     Technologist: Roxann Cifuentes  LPN  Physician:    Tevin Ruiz D.O., P.T. Board Certified Physical Medicine and Rehabilitation  Board Certified Electrodiagnostic Medicine      Nerve conduction studies and electromyography were performed according to our laboratory policies and procedures which can be provided upon request. All abnormal values are identified in the table.  Laboratory normal values can also be provided upon request.       Cc: Felicity Baca.*  Tanesha De Luna MD

## 2022-05-18 ENCOUNTER — OFFICE VISIT (OUTPATIENT)
Dept: PODIATRY | Age: 37
End: 2022-05-18
Payer: COMMERCIAL

## 2022-05-18 VITALS — WEIGHT: 180 LBS | HEIGHT: 68 IN | BODY MASS INDEX: 27.28 KG/M2

## 2022-05-18 DIAGNOSIS — M79.662 PAIN IN BOTH LOWER LEGS: ICD-10-CM

## 2022-05-18 DIAGNOSIS — M79.661 PAIN IN BOTH LOWER LEGS: ICD-10-CM

## 2022-05-18 DIAGNOSIS — G60.9 IDIOPATHIC PERIPHERAL NEUROPATHY: Primary | ICD-10-CM

## 2022-05-18 DIAGNOSIS — R26.2 DIFFICULTY WALKING: ICD-10-CM

## 2022-05-18 PROCEDURE — G8427 DOCREV CUR MEDS BY ELIG CLIN: HCPCS | Performed by: PODIATRIST

## 2022-05-18 PROCEDURE — G8417 CALC BMI ABV UP PARAM F/U: HCPCS | Performed by: PODIATRIST

## 2022-05-18 PROCEDURE — 1036F TOBACCO NON-USER: CPT | Performed by: PODIATRIST

## 2022-05-18 PROCEDURE — 99213 OFFICE O/P EST LOW 20 MIN: CPT | Performed by: PODIATRIST

## 2022-05-18 NOTE — PROGRESS NOTES
22     Ernestina Gutierrez    : 1985   Sex: female    Age: 31 y.o. Patient's PCP/Provider is:  Al Baez MD    Subjective:  Patient is seen today for follow-up regarding continued evaluation regarding peripheral neuropathy issues into both lower extremities. Patient was unable to go through with the entire EMG study due to some vasovagal issues. Patient still having issues with standing and cramping issues into both lower extremities. Would like to discuss getting the EMG study reordered and performed. No other additional abnormalities noted. Chief Complaint   Patient presents with    Follow-up     EMG results        ROS:  Const: Positives and pertinent negatives as per HPI. Musculo: Denies symptoms other than stated above. Neuro: Denies symptoms other than stated above. Skin: Denies symptoms other than stated above. Current Medications:    Current Outpatient Medications:     zonisamide (ZONEGRAN) 25 MG capsule, Take 100 mg by mouth daily , Disp: , Rfl:     naratriptan (AMERGE) 2.5 MG tablet, Take 2.5 mg by mouth as needed, Disp: , Rfl:     Erenumab-aooe 70 MG/ML SOAJ, Inject 140 mg into the skin every 30 days Inject one dose subcutaneously every 30 days, Disp: 1 pen, Rfl: 11    Levonorgestrel (MIRENA, 52 MG, IU), by IntraUTERine route, Disp: , Rfl:     loratadine (CLARITIN) 10 MG tablet, Take 10 mg by mouth daily, Disp: , Rfl:     azelastine (ASTELIN) 0.1 % nasal spray, 2 sprays by Nasal route 2 times daily Use in each nostril as directed, Disp: 1 Bottle, Rfl: 1    prochlorperazine (COMPAZINE) 5 MG tablet, Take 1 tablet by mouth every 8 hours as needed for Nausea, Disp: 120 tablet, Rfl: 1    fluticasone (FLONASE) 50 MCG/ACT nasal spray, 1 spray by Each Nostril route daily, Disp: 2 Bottle, Rfl: 1    Allergies:   Allergies   Allergen Reactions    Vyvanse [Lisdexamfetamine] Anxiety    Toradol [Ketorolac Tromethamine] Other (See Comments)     Pt states she gets very restless       Vitals:    05/18/22 1401   Weight: 180 lb (81.6 kg)   Height: 5' 8\" (1.727 m)       Exam:  Neurovascular status unchanged. Paresthesias still noted into both lower extremities. Patient still having cramping issues into both lower extremities as well. No other additional abnormalities noted at this time. Diagnostic Studies:     XR FOOT LEFT (MIN 3 VIEWS)    Result Date: 4/19/2022  EXAMINATION: THREE XRAY VIEWS OF THE LEFT FOOT 4/19/2022 11:59 am COMPARISON: None. HISTORY: ORDERING SYSTEM PROVIDED HISTORY: Left foot pain TECHNOLOGIST PROVIDED HISTORY: Standing unless patient unable to stand FINDINGS: There is no evidence of acute fracture. There is normal alignment of the tarsometatarsal joints. No acute joint abnormality. No focal osseous lesion. No focal soft tissue abnormality. No acute osseous findings in the left foot. XR FOOT RIGHT (MIN 3 VIEWS)    Result Date: 4/19/2022  EXAMINATION: THREE XRAY VIEWS OF THE RIGHT FOOT 4/19/2022 11:59 am COMPARISON: None. HISTORY: ORDERING SYSTEM PROVIDED HISTORY: Right foot pain FINDINGS: There is no evidence of fracture or dislocation. No significant osteodegenerative or other osseous abnormalities are seen. No significant surrounding soft tissue abnormality is identified. No fracture or dislocation. Procedures:    None    Plan Per Assessment  Lola Jacinto was seen today for follow-up. Diagnoses and all orders for this visit:    Idiopathic peripheral neuropathy  -     EMG; Future    Pain in both lower legs  -     EMG; Future    Difficulty walking  -     EMG; Future      1. Evaluation and management  2. We did send in a new referral for EMG testing for both lower extremities to assess etiology of the longstanding paresthesias. 3. Patient was advised continued use of her good supportive shoe gear in the interim as well. 4. Patient will be followed up at a later date for continued evaluation and management.   We will discuss EMG results at that time. Seen By:    Alka Carr DPM    Electronically signed by Alka Carr DPM on 5/18/2022 at 2:34 PM    This note was created using voice recognition software. The note was reviewed however may contain grammatical errors.

## 2022-08-06 ENCOUNTER — HOSPITAL ENCOUNTER (EMERGENCY)
Age: 37
Discharge: HOME | End: 2022-08-06
Attending: EMERGENCY MEDICINE
Payer: COMMERCIAL

## 2022-08-06 ENCOUNTER — APPOINTMENT (OUTPATIENT)
Dept: ULTRASOUND IMAGING | Age: 37
End: 2022-08-06
Payer: COMMERCIAL

## 2022-08-06 VITALS
HEART RATE: 92 BPM | WEIGHT: 190 LBS | SYSTOLIC BLOOD PRESSURE: 123 MMHG | TEMPERATURE: 98.1 F | RESPIRATION RATE: 18 BRPM | DIASTOLIC BLOOD PRESSURE: 79 MMHG | OXYGEN SATURATION: 99 % | BODY MASS INDEX: 28.89 KG/M2

## 2022-08-06 DIAGNOSIS — M79.604 RIGHT LEG PAIN: Primary | ICD-10-CM

## 2022-08-06 PROCEDURE — 99284 EMERGENCY DEPT VISIT MOD MDM: CPT

## 2022-08-06 PROCEDURE — 93971 EXTREMITY STUDY: CPT

## 2022-08-06 NOTE — ED NOTES
Pt transported to [address] [place] [address] [address] at this time.      Lou Camarillo RN  08/06/22 4273

## 2022-08-06 NOTE — ED PROVIDER NOTES
HPI:  22, Time: 8:59 AM EDT         Mahesh Deutsch is a 31 y.o. female presenting to the ED for discomfort and swelling in right lower extremity in calf and behind knee, beginning yesterday. States she had family history of \"bad veins\" and is concerned for blood clot. She does endorse some shortness of breath but thinks it's anxiety. Declines pain medicine. The complaint has been persistent, moderate in severity, and worsened by nothing. Patient denies fever/chills, sore throat, cough, congestion, chest pain, headache, visual disturbances, focal paresthesias, focal weakness, abdominal pain, nausea, vomiting, diarrhea, constipation, dysuria, hematuria, trauma, neck or back pain, rash or other complaints. She denies left leg pain, swelling or complaints. ROS:   A complete review of systems was performed and all pertinent positives and negatives are stated within HPI, all other systems reviewed and are negative.      --------------------------------------------- PAST HISTORY ---------------------------------------------  Past Medical History:  has a past medical history of ADHD (attention deficit hyperactivity disorder), Complication of anesthesia, Ethmoid sinusitis, History of migraine, Migraine without aura and without status migrainosus, not intractable, Postpartum depression, and [facility] Past Surgical History:  has a past surgical history that includes  section; Cholecystectomy; and Abdomen surgery. Social History:  reports that she quit smoking about 4 years ago. Her smoking use included cigarettes. She has never used smokeless tobacco. She reports that she does not drink alcohol and does not use drugs.     Family History: family history includes Cancer in her paternal grandfather; Diabetes in her maternal grandmother; Heart Disease in her maternal grandfather; High Blood Pressure in her brother, father, maternal grandfather, maternal grandmother, and mother; High Cholesterol in her brother, father, maternal grandfather, maternal grandmother, and mother. The patients home medications have been reviewed. Allergies: Vyvanse [lisdexamfetamine]        ----------------------------------------PHYSICAL EXAM--------------------------------------  Constitutional:  Well developed, well nourished, no acute distress, non-toxic appearance   Eyes:  PERRL, conjunctiva normal, EOMI  HENT:  Atraumatic, external ears normal, nose normal, oropharynx moist. Neck- normal range of motion, no nuchal rigidity   Respiratory:  No respiratory distress, normal breath sounds, no rales, no wheezing   Cardiovascular:  Normal rate, normal rhythm, no murmurs, no gallops, no rubs. Radial and DP and PT pulses 2+ bilaterally. Compartments are soft. She is warm and well perfused. Cap refill less than 3 seconds. GI:  Soft, nondistended, normal bowel sounds, nontender, no organomegaly, no mass, no rebound, no guarding   :  No costovertebral angle tenderness   Musculoskeletal:  No edema, no tenderness, no deformities. Integument:  Well hydrated, no rash. Adequate perfusion. Neurologic:  Alert & oriented x 3, CN 2-12 normal, no focal deficits noted. Psychiatric:  Speech and behavior appropriate       -------------------------------------------------- RESULTS -------------------------------------------------  I have personally reviewed all laboratory and imaging results for this patient. Results are listed below. LABS:  No results found for this visit on 08/06/22. RADIOLOGY:  Interpreted by Radiologist.  US DUP LOWER EXTREMITY RIGHT PRAVEEN   Final Result   No evidence of DVT in the right lower extremity.                 ------------------------- NURSING NOTES AND VITALS REVIEWED ---------------------------  The nursing notes within the ED encounter and vital signs as below have been reviewed by myself.     /79   Pulse 92   Temp 98.1 °F (36.7 °C) (Oral)   Resp 18   Wt 190 lb (86.2 kg) SpO2 99%   BMI 28.89 kg/m²   Oxygen Saturation Interpretation: Normal      The patients available past medical records and past encounters were reviewed. ------------------------------ ED COURSE/MEDICAL DECISION MAKING----------------------  Medications - No data to display        Procedures:   none      Medical Decision Making:    US neg for DVT, advised to f/u with PCP for repeat us in 1 week if still symptomatic to eval for DVT again. States has been having leg issues for a long time now and needs to see PCP for that. She is nv intact and ambulatory upon discharge   Patient was explicitly instructed on specific signs and symptoms on which to return to the emergency room for. Patient was instructed to return to the ER for any new or worsening symptoms. Additional discharge instructions were given verbally. All questions were answered. Patient is comfortable and agreeable with discharge plan. Patient in no acute distress and non-toxic in appearance. This patient's ED course included: a personal history and physicial eaxmination    This patient has remained hemodynamically stable during their ED course. Re-Evaluations:  Time: 9:57 AM EDT   Re-evaluation. Patients symptoms show no change    Consultations:   none    Critical Care: none    I, Brenda Poon DO, am the Primary Provider of Record    Counseling: The emergency provider has spoken with the patient and discussed todays results, in addition to providing specific details for the plan of care and counseling regarding the diagnosis and prognosis. Questions are answered at this time and they are agreeable with the plan.    --------------------------- IMPRESSION AND DISPOSITION ---------------------------------    IMPRESSION  1.  Right leg pain        DISPOSITION  Disposition: Discharge to home  Patient condition is stable             Malini Stephenson DO  08/06/22 5524

## 2022-08-10 ENCOUNTER — OFFICE VISIT (OUTPATIENT)
Dept: ENT CLINIC | Age: 37
End: 2022-08-10
Payer: COMMERCIAL

## 2022-08-10 VITALS
BODY MASS INDEX: 29.87 KG/M2 | HEIGHT: 68 IN | HEART RATE: 71 BPM | SYSTOLIC BLOOD PRESSURE: 116 MMHG | DIASTOLIC BLOOD PRESSURE: 75 MMHG | WEIGHT: 197.1 LBS

## 2022-08-10 DIAGNOSIS — J31.0 RHINITIS, CHRONIC: ICD-10-CM

## 2022-08-10 DIAGNOSIS — J32.9 CHRONIC SINUSITIS, UNSPECIFIED LOCATION: Primary | ICD-10-CM

## 2022-08-10 DIAGNOSIS — J30.89 SEASONAL ALLERGIC RHINITIS DUE TO OTHER ALLERGIC TRIGGER: ICD-10-CM

## 2022-08-10 PROCEDURE — 1036F TOBACCO NON-USER: CPT | Performed by: OTOLARYNGOLOGY

## 2022-08-10 PROCEDURE — G8427 DOCREV CUR MEDS BY ELIG CLIN: HCPCS | Performed by: OTOLARYNGOLOGY

## 2022-08-10 PROCEDURE — 99213 OFFICE O/P EST LOW 20 MIN: CPT | Performed by: OTOLARYNGOLOGY

## 2022-08-10 PROCEDURE — G8417 CALC BMI ABV UP PARAM F/U: HCPCS | Performed by: OTOLARYNGOLOGY

## 2022-08-10 RX ORDER — FLUTICASONE PROPIONATE 50 MCG
1 SPRAY, SUSPENSION (ML) NASAL DAILY
Qty: 2 EACH | Refills: 1 | Status: SHIPPED | OUTPATIENT
Start: 2022-08-10

## 2022-08-10 RX ORDER — AZELASTINE 1 MG/ML
2 SPRAY, METERED NASAL 2 TIMES DAILY
Qty: 1 EACH | Refills: 1 | Status: SHIPPED | OUTPATIENT
Start: 2022-08-10

## 2022-08-10 ASSESSMENT — ENCOUNTER SYMPTOMS
RHINORRHEA: 0
COUGH: 0
SINUS PRESSURE: 0
SHORTNESS OF BREATH: 0
VOMITING: 0

## 2022-08-10 NOTE — PROGRESS NOTES
[facility] Otolaryngology  Dr. Ayala Lubin. Scott Overall. Ms.Ed        Patient Name:  Pam Sanz  :  1985     CHIEF C/O:    Chief Complaint   Patient presents with    Follow-up     6 mo sinus       HISTORY OBTAINED FROM:  patient    HISTORY OF PRESENT ILLNESS:       Nicolle Pickard is a 31y.o. year old female, here today for follow up of chronic sinusitis; underwent CT. Still using flonase and astelin and doing well. CT scan reviewed with the patient today, no current complaints of epistaxis headaches or vision changes, feels like nasal congestion and rhinitis are improving. No complaints of new hoarseness shortness of breath stridor headaches or vision changes. CT scan does not reveal any significant pathological changes to the maxillary ethmoid sphenoid and frontal sinuses at this time, the inflammatory changes of the nose itself. 8/10/22: here for 6 mo allergy check. On Astelin, Flonase and OTC allergy medication. She is doing well, loves the sprays and is well controlled. No side effects.       Past Medical History:   Diagnosis Date    ADHD (attention deficit hyperactivity disorder)     Complication of anesthesia     states spinal did not last entire case    Ethmoid sinusitis 2020    History of migraine 2020    Migraine without aura and without status migrainosus, not intractable 2020    Postpartum depression     with first child    Thromb[facility] ([facility]      Past Surgical History:   Procedure Laterality Date    ABDOMEN SURGERY       section x 2     SECTION      CHOLECYSTECTOMY      MOLE REMOVAL Right 2022       Current Outpatient Medications:     naratriptan (AMERGE) 2.5 MG tablet, Take 2.5 mg by mouth as needed, Disp: , Rfl:     Erenumab-aooe 70 MG/ML SOAJ, Inject 140 mg into the skin every 30 days Inject one dose subcutaneously every 30 days, Disp: 1 pen, Rfl: 11    Levonorgestrel (MIRENA, 52 MG, IU), by IntraUTERine route, Disp: , Rfl:     loratadine (CLARITIN) 10 MG tablet, Sitting, Cuff Size: Medium Adult)   Pulse 71   Ht 5' 8\" (1.727 m)   Wt 197 lb 1.6 oz (89.4 kg)   BMI 29.97 kg/m²   Physical Exam  Vitals and nursing note reviewed. Constitutional:       Appearance: She is well-developed. HENT:      Head: Normocephalic and atraumatic. No contusion, masses or laceration. Jaw: No tenderness or swelling. Right Ear: Tympanic membrane, ear canal and external ear normal. No decreased hearing noted. There is no impacted cerumen. Left Ear: Tympanic membrane, ear canal and external ear normal. No decreased hearing noted. There is no impacted cerumen. Nose: Septal deviation present. No congestion or rhinorrhea. Right Nostril: No epistaxis. Left Nostril: No epistaxis. Right Turbinates: Not enlarged. Left Turbinates: Not enlarged. Mouth/Throat:      Mouth: Mucous membranes are moist. No oral lesions. Dentition: No gum lesions. Pharynx: No oropharyngeal exudate or posterior oropharyngeal erythema. Eyes:      Pupils: Pupils are equal, round, and reactive to light. Neck:      Thyroid: No thyromegaly. Trachea: No tracheal deviation. Cardiovascular:      Rate and Rhythm: Normal rate. Pulmonary:      Effort: Pulmonary effort is normal. No respiratory distress. Musculoskeletal:         General: Normal range of motion. Cervical back: Normal range of motion. Lymphadenopathy:      Cervical: No cervical adenopathy. Skin:     General: Skin is warm. Findings: No erythema. Neurological:      Mental Status: She is alert. Cranial Nerves: No cranial nerve deficit. IMPRESSION/PLAN:  Allergic rhinitis  Chronic sinusitis   CT scan previously negative   Cont Astelin and Flonase   Follow up in 6 months     Dr. Ede Joyner.  Otolaryngology Facial Plastic Surgery  :  [facility] Otolaryngology/Facial Plastic Surgery Residency  Associate Clinical Professor:  Adam Peacock, [address] [place] [address] [facility]          Sam Aldo  1985      I have discussed the case, including pertinent history and exam findings with the resident. I have seen and examined the patient and the key elements of the encounter have been performed by me. I agree with the assessment, plan and orders as documented by the resident. Patient here for follow up of medical problems. Remainder of medical problems as per resident note.       [address] [facility], DO  9/8/22

## 2022-08-11 ENCOUNTER — OFFICE VISIT (OUTPATIENT)
Dept: FAMILY MEDICINE CLINIC | Age: 37
End: 2022-08-11
Payer: COMMERCIAL

## 2022-08-11 ENCOUNTER — OFFICE VISIT (OUTPATIENT)
Dept: PODIATRY | Age: 37
End: 2022-08-11
Payer: COMMERCIAL

## 2022-08-11 VITALS
DIASTOLIC BLOOD PRESSURE: 78 MMHG | BODY MASS INDEX: 31.75 KG/M2 | SYSTOLIC BLOOD PRESSURE: 120 MMHG | HEART RATE: 84 BPM | HEIGHT: 68 IN | OXYGEN SATURATION: 100 % | WEIGHT: 209.5 LBS | TEMPERATURE: 97.9 F

## 2022-08-11 VITALS — WEIGHT: 197 LBS | HEIGHT: 68 IN | BODY MASS INDEX: 29.86 KG/M2

## 2022-08-11 DIAGNOSIS — J30.9 ALLERGIC SINUSITIS: ICD-10-CM

## 2022-08-11 DIAGNOSIS — M79.662 PAIN IN BOTH LOWER LEGS: ICD-10-CM

## 2022-08-11 DIAGNOSIS — M79.661 PAIN IN BOTH LOWER LEGS: ICD-10-CM

## 2022-08-11 DIAGNOSIS — E78.5 HYPERLIPIDEMIA, UNSPECIFIED HYPERLIPIDEMIA TYPE: Primary | ICD-10-CM

## 2022-08-11 DIAGNOSIS — G60.9 IDIOPATHIC PERIPHERAL NEUROPATHY: Primary | ICD-10-CM

## 2022-08-11 DIAGNOSIS — R20.2 PARESTHESIA OF FOOT, BILATERAL: ICD-10-CM

## 2022-08-11 DIAGNOSIS — R26.2 DIFFICULTY WALKING: ICD-10-CM

## 2022-08-11 DIAGNOSIS — G43.909 MIGRAINE WITHOUT STATUS MIGRAINOSUS, NOT INTRACTABLE, UNSPECIFIED MIGRAINE TYPE: ICD-10-CM

## 2022-08-11 PROCEDURE — 99213 OFFICE O/P EST LOW 20 MIN: CPT | Performed by: INTERNAL MEDICINE

## 2022-08-11 PROCEDURE — 1036F TOBACCO NON-USER: CPT | Performed by: PODIATRIST

## 2022-08-11 PROCEDURE — G8427 DOCREV CUR MEDS BY ELIG CLIN: HCPCS | Performed by: INTERNAL MEDICINE

## 2022-08-11 PROCEDURE — G8417 CALC BMI ABV UP PARAM F/U: HCPCS | Performed by: INTERNAL MEDICINE

## 2022-08-11 PROCEDURE — 1036F TOBACCO NON-USER: CPT | Performed by: INTERNAL MEDICINE

## 2022-08-11 PROCEDURE — G8427 DOCREV CUR MEDS BY ELIG CLIN: HCPCS | Performed by: PODIATRIST

## 2022-08-11 PROCEDURE — G8417 CALC BMI ABV UP PARAM F/U: HCPCS | Performed by: PODIATRIST

## 2022-08-11 PROCEDURE — 99213 OFFICE O/P EST LOW 20 MIN: CPT | Performed by: PODIATRIST

## 2022-08-11 RX ORDER — ZONISAMIDE 100 MG/1
CAPSULE ORAL
COMMUNITY
Start: 2022-08-04

## 2022-08-11 ASSESSMENT — ENCOUNTER SYMPTOMS
SHORTNESS OF BREATH: 0
EYE PAIN: 0
SORE THROAT: 0
BLOOD IN STOOL: 0
ABDOMINAL PAIN: 0
SINUS PAIN: 0
EYE DISCHARGE: 0
NAUSEA: 0

## 2022-08-11 ASSESSMENT — PATIENT HEALTH QUESTIONNAIRE - PHQ9
2. FEELING DOWN, DEPRESSED OR HOPELESS: 0
1. LITTLE INTEREST OR PLEASURE IN DOING THINGS: 0
SUM OF ALL RESPONSES TO PHQ QUESTIONS 1-9: 0
SUM OF ALL RESPONSES TO PHQ9 QUESTIONS 1 & 2: 0

## 2022-08-11 ASSESSMENT — LIFESTYLE VARIABLES
HOW OFTEN DO YOU HAVE A DRINK CONTAINING ALCOHOL: MONTHLY OR LESS
HOW MANY STANDARD DRINKS CONTAINING ALCOHOL DO YOU HAVE ON A TYPICAL DAY: 1 OR 2

## 2022-08-11 NOTE — PROGRESS NOTES
Patient is here for bilateral foot pain.  Patient states EMG was inconclusive since she pasted out during the test. Kelly Awad MD  Electronically signed by Dionne Ramos LPN on 2/14/2071 at 8:20 PM

## 2022-08-11 NOTE — PROGRESS NOTES
22     Jude Daugherty    : 1985   Sex: female    Age: 31 y.o. Patient's PCP/Provider is:  Crystal Pulliam MD    Subjective:  Patient is seen today for follow-up regarding continued evaluation regarding chronic paresthesias and pain into both lower extremities. Patient presents today to discuss rescheduling her EMG testing. She denies any additional issues at this time. She has been trying to wear her good supportive shoe gear and insoles as instructed in the interim. Chief Complaint   Patient presents with    Follow-up     Bilateral foot pain       ROS:  Const: Positives and pertinent negatives as per HPI. Musculo: Denies symptoms other than stated above. Neuro: Denies symptoms other than stated above. Skin: Denies symptoms other than stated above. Current Medications:    Current Outpatient Medications:     azelastine (ASTELIN) 0.1 % nasal spray, 2 sprays by Nasal route in the morning and 2 sprays before bedtime. Use in each nostril as directed., Disp: 1 each, Rfl: 1    fluticasone (FLONASE) 50 MCG/ACT nasal spray, 1 spray by Each Nostril route in the morning., Disp: 2 each, Rfl: 1    naratriptan (AMERGE) 2.5 MG tablet, Take 2.5 mg by mouth as needed, Disp: , Rfl:     Erenumab-aooe 70 MG/ML SOAJ, Inject 140 mg into the skin every 30 days Inject one dose subcutaneously every 30 days, Disp: 1 pen, Rfl: 11    Levonorgestrel (MIRENA, 52 MG, IU), by IntraUTERine route, Disp: , Rfl:     loratadine (CLARITIN) 10 MG tablet, Take 10 mg by mouth daily, Disp: , Rfl:     prochlorperazine (COMPAZINE) 5 MG tablet, Take 1 tablet by mouth every 8 hours as needed for Nausea, Disp: 120 tablet, Rfl: 1    zonisamide (ZONEGRAN) 25 MG capsule, Take 100 mg by mouth daily , Disp: , Rfl:     Allergies: Allergies   Allergen Reactions    Vyvanse [Lisdexamfetamine] Anxiety       Vitals:    22 1421   Weight: 197 lb (89.4 kg)   Height: 5' 8\" (1.727 m)       Exam:  Neurovascular status unchanged. Paresthesias noted into both lower extremities. No edema or ecchymotic skin changes noted into both lower extremities. Adequate range of motion lower extremities noted with stable gait. Diagnostic Studies:     US DUP LOWER EXTREMITY RIGHT PRAVEEN    Result Date: 8/6/2022  EXAMINATION: DUPLEX VENOUS ULTRASOUND OF THE RIGHT LOWER EXTREMITY 8/6/2022 9:08 am TECHNIQUE: Duplex ultrasound using B-mode/gray scaled imaging and Doppler spectral analysis and color flow was obtained of the deep venous structures of the right lower extremity. COMPARISON: None. HISTORY: ORDERING SYSTEM PROVIDED HISTORY: discomfort: rule out DVT TECHNOLOGIST PROVIDED HISTORY: Reason for exam:->discomfort: rule out DVT What reading provider will be dictating this exam?->CRC FINDINGS: The visualized veins of the right lower extremity are patent and free of echogenic thrombus. The veins demonstrate good compressibility with normal color flow study and spectral analysis. No evidence of DVT in the right lower extremity. Procedures:    None    Plan Per Assessment  Caroline Fishman was seen today for follow-up. Diagnoses and all orders for this visit:    Idiopathic peripheral neuropathy  -     EMG; Future    Pain in both lower legs  -     EMG; Future    Difficulty walking  -     EMG; Future      Evaluation and management  We did discuss rescheduling the EMG study on both lower extremities to rule out any possible radicular issues. Patient is to continue with her current shoe gear and compression garments as needed with every day work activities. Patient will be followed up at a later date for continued evaluation and management. Seen By:    Anisa Freeman DPM    Electronically signed by Anisa Freeman DPM on 8/11/2022 at 2:45 PM    This note was created using voice recognition software. The note was reviewed however may contain grammatical errors.

## 2022-08-11 NOTE — PROGRESS NOTES
Chief Complaint   Patient presents with    Migraine     [facility] is managing migraines doing a lot better     Health Maintenance     PAP done with Dr. Bro Steele 05/2022        HPI:  Patient is here for follow-up     Feeling better    Her Migraine episodes are better  Now has mod to sev may be once a month    Tolerating her current regime of medications - better       Allergy and Medications are reviewed and updated. Past Medical History, Surgical History, and Family History has been reviewed and updated. Review of Systems:  Review of Systems   Constitutional:  Negative for chills and fever. HENT:  Negative for congestion, sinus pain and sore throat. Eyes:  Negative for pain and discharge. Respiratory:  Negative for shortness of breath (No new SOb). Cardiovascular:  Negative for chest pain. Gastrointestinal:  Negative for abdominal pain, blood in stool and nausea. Genitourinary:  Negative for flank pain and frequency. Musculoskeletal:  Negative for neck pain. Hematological:  Does not bruise/bleed easily. Psychiatric/Behavioral:  Negative for suicidal ideas. Vitals:    08/11/22 1606   BP: 120/78   Position: Sitting   Pulse: 84   Temp: 97.9 °F (36.6 °C)   TempSrc: Temporal   SpO2: 100%   Weight: 209 lb 8 oz (95 kg)   Height: 5' 8\" (1.727 m)       Physical Exam  Vitals reviewed. Constitutional:       Appearance: She is well-developed. HENT:      Head: Normocephalic and atraumatic. Nose: Nose normal.   Eyes:      Conjunctiva/sclera: Conjunctivae normal.      Pupils: Pupils are equal, round, and reactive to light. Neck:      Vascular: No JVD. Cardiovascular:      Rate and Rhythm: Normal rate and regular rhythm. Pulmonary:      Effort: Pulmonary effort is normal.      Breath sounds: Normal breath sounds. Abdominal:      General: Bowel sounds are normal.      Palpations: Abdomen is soft. Musculoskeletal:         General: Normal range of motion.    Skin:     General: Skin is warm and dry. Neurological:      Mental Status: She is alert and oriented to person, place, and time. Psychiatric:         Behavior: Behavior normal.        Labs :    Lab Results   Component Value Date    WBC 6.9 2022    HGB 13.1 2022    HCT 40.8 2022     2022    HDL 43 2022    ALT 13 2021    AST 14 2021     2021    K 4.2 2021     (H) 2021    CREATININE 1.0 2021    BUN 10 2021    CO2 18 (L) 2021    TSH 0.800 2022    GLUF 105 (H) 2021    LABA1C 5.0 2020     Lab Results   Component Value Date/Time    COLORU Yellow 2021 10:50 AM    NITRU Negative 2021 10:50 AM    GLUCOSEU Negative 2021 10:50 AM    KETUA Negative 2021 10:50 AM    UROBILINOGEN 1.0 2021 10:50 AM    BILIRUBINUR Negative 2021 10:50 AM             ASSESSMENT     Patient Active Problem List    Diagnosis Date Noted    Idiopathic peripheral neuropathy 2022     Priority: Medium    Pain in both lower legs 2022     Priority: Medium    Hyperlipidemia 2022     Priority: Medium    Migraine without status migrainosus, not intractable 2022     Priority: Medium    Paresthesia of foot, bilateral 2022     Priority: Medium    History of migraine 2020    Allergic sinusitis 2020    Migraine without aura and without status migrainosus, not intractable 2020    Antepartum genitourinary tract infection 2013    Previous  delivery, antepartum condition or complication 4811        Diagnosis:   1. Hyperlipidemia, unspecified hyperlipidemia type  2. Migraine without status migrainosus, not intractable, unspecified migraine type  3. Allergic sinusitis  4.  Paresthesia of foot, bilateral       PLAN:       Pt is feeling okay  Shanell for EMG for legs    Her Neuro notes from [facility] noted    Office notes of ENT and Podiatrist - reviewed    Pt is stable on current medical treatment. Continue current treatment plan    Side effects/Adverse effects/Precautions are reviewed with patient. Low salt, Low Carb diet an low fat diet  Continue medications as advised and take them regularly  Regular exercises as advised    Discussed natural and expected course of this diagnosis and need to alert me if symptoms do not follow expected course, or if any worse. Smoking cessation if applicable, discussed with patient. There are no Patient Instructions on file for this visit. Return in about 3 months (around 11/11/2022).

## 2022-08-26 ENCOUNTER — OFFICE VISIT (OUTPATIENT)
Dept: PHYSICAL MEDICINE AND REHAB | Age: 37
End: 2022-08-26
Payer: COMMERCIAL

## 2022-08-26 VITALS — BODY MASS INDEX: 29.25 KG/M2 | WEIGHT: 193 LBS | HEIGHT: 68 IN

## 2022-08-26 DIAGNOSIS — M79.661 PAIN IN BOTH LOWER LEGS: ICD-10-CM

## 2022-08-26 DIAGNOSIS — G60.9 IDIOPATHIC PERIPHERAL NEUROPATHY: ICD-10-CM

## 2022-08-26 DIAGNOSIS — M79.662 PAIN IN BOTH LOWER LEGS: ICD-10-CM

## 2022-08-26 DIAGNOSIS — R26.2 DIFFICULTY WALKING: ICD-10-CM

## 2022-08-26 PROCEDURE — 95885 MUSC TST DONE W/NERV TST LIM: CPT | Performed by: PHYSICAL MEDICINE & REHABILITATION

## 2022-08-26 PROCEDURE — 95886 MUSC TEST DONE W/N TEST COMP: CPT | Performed by: PHYSICAL MEDICINE & REHABILITATION

## 2022-08-26 NOTE — PROGRESS NOTES
[facility]  Electrodiagnostic Laboratory  *Accredited by the [address] [place] [address] with exemplary status  [address] [facility] [address]  Phone: [phone]  Fax: [phone]    Referring Provider: Bruce Parks.*  Primary Care Physician: Carol Hernandez MD  Patient Name: Elvia Malloy  Patient YOB: 1985  Gender: female  BMI: Body mass index is 29.35 kg/m². Height 5' 8\" (1.727 m), weight 193 lb (87.5 kg). 8/26/2022    Reason for Referral: Idiopathic peripheral neuropathy      Today's examination was strictly for completing the previously attempted and incomplete EMG in April 2022 at which time patient became vasovagal during the needle examination of the right leg and was unable to complete the study. EMG      EMG Summary Table     Spontaneous MUAP Recruitment   Muscle Nerve Roots IA Fib PSW Fasc Amp Dur. PPP Pattern   L. Vastus medialis Femoral L2-L4 N None None None N N N N   L. Tibialis anterior Deep peroneal (Fibular) L4-L5 N None None None N N N N   L. Gastrocnemius (Medial head) Tibial S1-S2 N None None None N N N N   L. Extensor hallucis longus Deep peroneal (Fibular) L5-S1 N None None None N N N N   L. Lumbar paraspinals (low)  - N None None None N N N N   L. Lumbar paraspinals (mid)  - N None None None N N N N   R. Lumbar paraspinals (low)  - N None None None N N N N   R. Lumbar paraspinals (mid)  - N None None None N N N N        Study Limitations:  none    Needle EMG:   Needle EMG was performed using a concentric needle. Observed motor units were normal in amplitude, duration, phases and recruitment and no active denervation signs were seen. Diagnostic Interpretation: This study was normal.     Follow up EMG is recommended if clinically warranted. Technologist: Danielle Jacobson  Physician:    Darby Lopez D.O., P.T.   Board Certified Physical Medicine and Rehabilitation  Board Certified Electrodiagnostic Medicine      Nerve conduction studies and electromyography were performed according to our laboratory policies and procedures which can be provided upon request. All abnormal values are identified in the table.  Laboratory normal values can also be provided upon request.       Cc: Davide Yusuf.*  Padmini Castro MD

## 2022-08-26 NOTE — PATIENT INSTRUCTIONS
Electrodiagnotic Laboratory  [facility] the [facility]  BERNABE Francois D.O. [facility]  [address] [facility] [address]  Phone: [phone]  Fax: [phone]        Today you had an electrodiagnostic exam which included nerve conduction studies (NCS) and electromyography (EMG). This test evaluated the electrical activity of your nerves and muscles to help determine if you have a nerve or muscle disease. This test can help determine the location and type of a nerve or muscle problem. This will help your referring doctor diagnose your condition and determine the appropriate next step in your treatment plan. After your test:    1. There are no long lasting side effects of the test.     2. You may resume your normal activities without restrictions. 3.  Resume any medications that were stopped for the test.     4  If you have sore areas or bruising in your muscles where the needle was placed, apply a cold pack to the sore area for 15-20 minutes three to four times a day as needed for pain. The soreness should go away in about 1-2 days. 5. Your results were provided  Briefly at the end of your test and the final detailed report will be provided to your referring physician, and/or primary care physician and any other parties you requested within 1-2 days of the examination. You may wish to contact your referring provider after a few days to determine what they would like you to do next. 6.  Please call [phone] with any questions or concerns and if you develop increased body temperature/fever, swelling, tenderness, increased pain and/or drainage from the sites where the needle was placed. Thank you for choosing us for your health care needs.

## 2022-09-27 ENCOUNTER — TELEPHONE (OUTPATIENT)
Dept: PODIATRY | Age: 37
End: 2022-09-27

## 2022-11-14 ENCOUNTER — OFFICE VISIT (OUTPATIENT)
Dept: FAMILY MEDICINE CLINIC | Age: 37
End: 2022-11-14
Payer: COMMERCIAL

## 2022-11-14 VITALS
SYSTOLIC BLOOD PRESSURE: 120 MMHG | HEIGHT: 68 IN | OXYGEN SATURATION: 100 % | BODY MASS INDEX: 30.3 KG/M2 | TEMPERATURE: 97.1 F | HEART RATE: 67 BPM | DIASTOLIC BLOOD PRESSURE: 78 MMHG | WEIGHT: 199.9 LBS

## 2022-11-14 DIAGNOSIS — E78.5 HYPERLIPIDEMIA, UNSPECIFIED HYPERLIPIDEMIA TYPE: Primary | ICD-10-CM

## 2022-11-14 DIAGNOSIS — R20.2 PARESTHESIA OF FOOT, BILATERAL: ICD-10-CM

## 2022-11-14 DIAGNOSIS — G43.909 MIGRAINE WITHOUT STATUS MIGRAINOSUS, NOT INTRACTABLE, UNSPECIFIED MIGRAINE TYPE: ICD-10-CM

## 2022-11-14 DIAGNOSIS — M41.9 SCOLIOSIS, UNSPECIFIED SCOLIOSIS TYPE, UNSPECIFIED SPINAL REGION: ICD-10-CM

## 2022-11-14 PROCEDURE — G8417 CALC BMI ABV UP PARAM F/U: HCPCS | Performed by: INTERNAL MEDICINE

## 2022-11-14 PROCEDURE — 99214 OFFICE O/P EST MOD 30 MIN: CPT | Performed by: INTERNAL MEDICINE

## 2022-11-14 PROCEDURE — G8484 FLU IMMUNIZE NO ADMIN: HCPCS | Performed by: INTERNAL MEDICINE

## 2022-11-14 PROCEDURE — G8427 DOCREV CUR MEDS BY ELIG CLIN: HCPCS | Performed by: INTERNAL MEDICINE

## 2022-11-14 PROCEDURE — 1036F TOBACCO NON-USER: CPT | Performed by: INTERNAL MEDICINE

## 2022-11-14 ASSESSMENT — ENCOUNTER SYMPTOMS
BLOOD IN STOOL: 0
EYE DISCHARGE: 0
SHORTNESS OF BREATH: 0
SINUS PAIN: 0
EYE PAIN: 0
ABDOMINAL PAIN: 0
NAUSEA: 0
SORE THROAT: 0

## 2022-11-14 NOTE — PROGRESS NOTES
Chief Complaint   Patient presents with    Hyperlipidemia     Pt here for follow up, pt reports feeling ok. Health Maintenance     Pap - due        HPI:  Patient is here for follow-up     Feeling well    Migraine- controlled     Following with Clinic    Had EMG done- report - N ( Lower extremities)     Allergy and Medications are reviewed and updated. Past Medical History, Surgical History, and Family History has been reviewed and updated. Review of Systems:  Review of Systems   Constitutional:  Negative for chills and fever. HENT:  Negative for congestion, sinus pain and sore throat. Eyes:  Negative for pain and discharge. Respiratory:  Negative for shortness of breath (No new SOb). Cardiovascular:  Negative for chest pain. Gastrointestinal:  Negative for abdominal pain, blood in stool and nausea. Genitourinary:  Negative for flank pain and frequency. Musculoskeletal:  Negative for neck pain. Hematological:  Does not bruise/bleed easily. Psychiatric/Behavioral:  Negative for suicidal ideas. Vitals:    11/14/22 1145   BP: 120/78   Position: Sitting   Pulse: 67   Temp: 97.1 °F (36.2 °C)   TempSrc: Temporal   SpO2: 100%   Weight: 199 lb 14.4 oz (90.7 kg)   Height: 5' 8\" (1.727 m)       Physical Exam  Vitals reviewed. Constitutional:       Appearance: She is well-developed. HENT:      Head: Normocephalic and atraumatic. Nose: Nose normal.   Eyes:      Conjunctiva/sclera: Conjunctivae normal.      Pupils: Pupils are equal, round, and reactive to light. Neck:      Vascular: No JVD. Cardiovascular:      Rate and Rhythm: Normal rate and regular rhythm. Pulmonary:      Effort: Pulmonary effort is normal.      Breath sounds: Normal breath sounds. Abdominal:      General: Bowel sounds are normal.      Palpations: Abdomen is soft. Musculoskeletal:         General: Normal range of motion. Skin:     General: Skin is warm and dry.    Neurological:      Mental Status: She is alert and oriented to person, place, and time. Psychiatric:         Behavior: Behavior normal.        Labs :    Lab Results   Component Value Date    WBC 6.9 2022    HGB 13.1 2022    HCT 40.8 2022     2022    HDL 43 2022    ALT 13 2021    AST 14 2021     2021    K 4.2 2021     (H) 2021    CREATININE 1.0 2021    BUN 10 2021    CO2 18 (L) 2021    TSH 0.800 2022    GLUF 105 (H) 2021    LABA1C 5.0 2020     Lab Results   Component Value Date/Time    COLORU Yellow 2021 10:50 AM    NITRU Negative 2021 10:50 AM    GLUCOSEU Negative 2021 10:50 AM    KETUA Negative 2021 10:50 AM    UROBILINOGEN 1.0 2021 10:50 AM    BILIRUBINUR Negative 2021 10:50 AM     No results found for: PSA, CEA, , AE4318,           ASSESSMENT     Patient Active Problem List    Diagnosis Date Noted    Idiopathic peripheral neuropathy 2022     Priority: Medium    Pain in both lower legs 2022     Priority: Medium    Hyperlipidemia 2022     Priority: Medium    Migraine without status migrainosus, not intractable 2022     Priority: Medium    Paresthesia of foot, bilateral 2022     Priority: Medium    History of migraine 2020    Allergic sinusitis 2020    Migraine without aura and without status migrainosus, not intractable 2020    Antepartum genitourinary tract infection 2013    Previous  delivery, antepartum condition or complication         Diagnosis:   1. Hyperlipidemia, unspecified hyperlipidemia type  -     Comprehensive Metabolic Panel, Fasting; Future  -     Lipid, Fasting; Future  -     Urinalysis with Microscopic; Future  2. Migraine without status migrainosus, not intractable, unspecified migraine type  -     CBC with Auto Differential; Future  -     TSH; Future  3.  Paresthesia of foot, bilateral  -     CBC with Auto Differential; Future  -     Comprehensive Metabolic Panel, Fasting; Future  4. Scoliosis, unspecified scoliosis type, unspecified spinal region       PLAN:           There are no Patient Instructions on file for this visit. Return in about 3 months (around 2/14/2023).

## 2022-12-02 ENCOUNTER — OFFICE VISIT (OUTPATIENT)
Dept: FAMILY MEDICINE CLINIC | Age: 37
End: 2022-12-02
Payer: COMMERCIAL

## 2022-12-02 ENCOUNTER — TELEPHONE (OUTPATIENT)
Dept: FAMILY MEDICINE CLINIC | Age: 37
End: 2022-12-02

## 2022-12-02 VITALS
RESPIRATION RATE: 17 BRPM | HEIGHT: 68 IN | WEIGHT: 199 LBS | OXYGEN SATURATION: 99 % | SYSTOLIC BLOOD PRESSURE: 117 MMHG | HEART RATE: 62 BPM | DIASTOLIC BLOOD PRESSURE: 89 MMHG | BODY MASS INDEX: 30.16 KG/M2 | TEMPERATURE: 97.6 F

## 2022-12-02 DIAGNOSIS — H92.02 ACUTE PAIN OF LEFT EAR: Primary | ICD-10-CM

## 2022-12-02 PROCEDURE — 99213 OFFICE O/P EST LOW 20 MIN: CPT

## 2022-12-02 RX ORDER — AMOXICILLIN 500 MG/1
1 TABLET, FILM COATED ORAL 2 TIMES DAILY
Qty: 20 TABLET | Refills: 0 | Status: SHIPPED | OUTPATIENT
Start: 2022-12-02 | End: 2022-12-12

## 2022-12-02 RX ORDER — NEOMYCIN SULFATE, POLYMYXIN B SULFATE AND HYDROCORTISONE 10; 3.5; 1 MG/ML; MG/ML; [USP'U]/ML
3 SUSPENSION/ DROPS AURICULAR (OTIC) 4 TIMES DAILY
Qty: 10 ML | Refills: 0 | Status: SHIPPED | OUTPATIENT
Start: 2022-12-02 | End: 2022-12-12

## 2022-12-02 NOTE — TELEPHONE ENCOUNTER
Pt had migraine this morning and used a gel ice pack which burst and the contents went in her ear and she tried cleaning it out wants to know if there is anything else she could do because it still feels like there is stuff in there real deep and she feels a little pressure in that ear    Please Advise?

## 2022-12-02 NOTE — PROGRESS NOTES
Chief Complaint   Foreign Body in [facility],Zo 3 (Was laying on ice pack and it broke and got in ear she feels discomfort in the ear)      History of Present Illness   Source of history provided by:  patient. Tawny Narvaez is a 31 y.o. old female presenting to the walk in clinic for ear fullness to the left ear for the past 1 day. Pt reports that she fell asleep on a ice pack on her face and the ice pack opened and was within her left ear. There is mild pain associated with the complaint. Pt has tried nothing OTC for relief. Pt denies a history of cerumen impaction in the past. Denies any pain, bleeding, discharge from the ear, fever, chills, lethargy, or additional URI symptoms. ROS    Unless otherwise stated in this report or unable to obtain because of the patient's clinical or mental status as evidenced by the medical record, this patients's positive and negative responses for Review of Systems, constitutional, psych, eyes, ENT, cardiovascular, respiratory, gastrointestinal, neurological, genitourinary, musculoskeletal, integument systems and systems related to the presenting problem are either stated in the preceding or were not pertinent or were negative for the symptoms and/or complaints related to the medical problem. Physical Exam         VS:  /89 (Site: Left Upper Arm, Position: Sitting, Cuff Size: Medium Adult)   Pulse 62   Temp 97.6 °F (36.4 °C) (Temporal)   Resp 17   Ht 5' 8\" (1.727 m)   Wt 199 lb (90.3 kg)   SpO2 99%   BMI 30.26 kg/m²    Oxygen Saturation Interpretation: Normal.    Constitutional:  Alert, development consistent with age. Eyes:  PERRL, EOMI, no discharge or conjunctival injection. Ears:  External Ears: Normal bilaterally. TM's & External Canals:  Pre procedure: Pt's canal erythematous, TMs visualized and intact. Post procedure: Bilateral TMs translucent without erythema or perforation, translucent. Nose:  No drainage or active bleeding.   Mouth:  Mucous membranes moist without lesions, tongue and gums normal.  Throat:  Pharynx without injection, exudate, or tonsillar hypertrophy. Airway patient. No blood or drainage noted. Neck:  Supple. No lymphadenopathy. Lungs:  Clear to auscultation and breath sounds equal.  Heart:  Regular rate and rhythm. Skin:  Normal turgor. Warm, dry, without visible rash, unless noted elsewhere. Neurological:  Orientation age-appropriate unless noted elseware. Motor functions intact. Lab / Imaging Results   (All laboratory and radiology results have been personally reviewed by myself)  Labs:      Imaging: All Radiology results interpreted by Radiologist unless otherwise noted. Assessment / Plan     Impression(s):  Avery Hernandez was seen today for foreign body in ear. Diagnoses and all orders for this visit:    Acute pain of left ear    Other orders  -     Amoxicillin 500 MG TABS; Take 1 tablet by mouth 2 times daily for 10 days  -     neomycin-polymyxin-hydrocortisone (CORTISPORIN) 3.5-92114-6 otic suspension; Place 3 drops in ear(s) 4 times daily for 10 days      Disposition:  Disposition: Discharge to home. Pt tolerated the procedure well and reports total improvement in symptoms. No signs of trauma or active bleeding noted to the site. Advised to closely monitor for signs of secondary infection at home including pain, purulent drainage, uncontrolled bleeding, surrounding redness, or fever. Pt states understanding and is in agreement with this care plan. All questions answered. HUDSON Cheng - CNP    **This report was transcribed using voice recognition software. Every effort was made to ensure accuracy; however, inadvertent computerized transcription errors may be present.

## 2022-12-02 NOTE — TELEPHONE ENCOUNTER
Dr Reyna Kong stated go to the wALK IN CLINIC to be seen and evaluated. Called patient and LMOM to inform.

## 2023-02-22 ENCOUNTER — NURSE TRIAGE (OUTPATIENT)
Dept: OTHER | Facility: CLINIC | Age: 38
End: 2023-02-22
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
## 2023-03-15 ENCOUNTER — TELEPHONE (OUTPATIENT)
Dept: ADMINISTRATIVE | Age: 38
End: 2023-03-15

## 2023-03-16 DIAGNOSIS — R20.2 PARESTHESIA OF FOOT, BILATERAL: ICD-10-CM

## 2023-03-16 DIAGNOSIS — G43.909 MIGRAINE WITHOUT STATUS MIGRAINOSUS, NOT INTRACTABLE, UNSPECIFIED MIGRAINE TYPE: ICD-10-CM

## 2023-03-16 DIAGNOSIS — E78.5 HYPERLIPIDEMIA, UNSPECIFIED HYPERLIPIDEMIA TYPE: ICD-10-CM

## 2023-03-16 LAB
ALBUMIN SERPL-MCNC: 4 G/DL (ref 3.5–5.2)
ALP SERPL-CCNC: 75 U/L (ref 35–104)
ALT SERPL-CCNC: 20 U/L (ref 0–32)
ANION GAP SERPL CALCULATED.3IONS-SCNC: 13 MMOL/L (ref 7–16)
AST SERPL-CCNC: 19 U/L (ref 0–31)
BACTERIA URNS QL MICRO: ABNORMAL /HPF
BASOPHILS # BLD: 0.03 E9/L (ref 0–0.2)
BASOPHILS NFR BLD: 0.4 % (ref 0–2)
BILIRUB SERPL-MCNC: 0.4 MG/DL (ref 0–1.2)
BILIRUB UR QL STRIP: NEGATIVE
BUN SERPL-MCNC: 10 MG/DL (ref 6–20)
CALCIUM SERPL-MCNC: 8.7 MG/DL (ref 8.6–10.2)
CHLORIDE SERPL-SCNC: 110 MMOL/L (ref 98–107)
CHOLESTEROL, FASTING: 227 MG/DL (ref 0–199)
CLARITY UR: CLEAR
CO2 SERPL-SCNC: 20 MMOL/L (ref 22–29)
COLOR UR: YELLOW
CREAT SERPL-MCNC: 0.8 MG/DL (ref 0.5–1)
EOSINOPHIL # BLD: 0.15 E9/L (ref 0.05–0.5)
EOSINOPHIL NFR BLD: 2.1 % (ref 0–6)
EPI CELLS #/AREA URNS HPF: ABNORMAL /HPF
ERYTHROCYTE [DISTWIDTH] IN BLOOD BY AUTOMATED COUNT: 12.9 FL (ref 11.5–15)
GLUCOSE FASTING: 93 MG/DL (ref 74–99)
GLUCOSE UR STRIP-MCNC: NEGATIVE MG/DL
HCT VFR BLD AUTO: 42.4 % (ref 34–48)
HDLC SERPL-MCNC: 45 MG/DL
HGB BLD-MCNC: 13.1 G/DL (ref 11.5–15.5)
HGB UR QL STRIP: NEGATIVE
IMM GRANULOCYTES # BLD: 0.02 E9/L
IMM GRANULOCYTES NFR BLD: 0.3 % (ref 0–5)
KETONES UR STRIP-MCNC: NEGATIVE MG/DL
LDL CHOLESTEROL CALCULATED: 155 MG/DL (ref 0–99)
LEUKOCYTE ESTERASE UR QL STRIP: NEGATIVE
LYMPHOCYTES # BLD: 1.91 E9/L (ref 1.5–4)
LYMPHOCYTES NFR BLD: 26.7 % (ref 20–42)
MCH RBC QN AUTO: 28.5 PG (ref 26–35)
MCHC RBC AUTO-ENTMCNC: 30.9 % (ref 32–34.5)
MCV RBC AUTO: 92.2 FL (ref 80–99.9)
MONOCYTES # BLD: 0.52 E9/L (ref 0.1–0.95)
MONOCYTES NFR BLD: 7.3 % (ref 2–12)
NEUTROPHILS # BLD: 4.52 E9/L (ref 1.8–7.3)
NEUTS SEG NFR BLD: 63.2 % (ref 43–80)
NITRITE UR QL STRIP: NEGATIVE
PH UR STRIP: 6 [PH] (ref 5–9)
PLATELET # BLD AUTO: 256 E9/L (ref 130–450)
PMV BLD AUTO: 10 FL (ref 7–12)
POTASSIUM SERPL-SCNC: 4.1 MMOL/L (ref 3.5–5)
PROT SERPL-MCNC: 6.9 G/DL (ref 6.4–8.3)
PROT UR STRIP-MCNC: NEGATIVE MG/DL
RBC # BLD AUTO: 4.6 E12/L (ref 3.5–5.5)
RBC #/AREA URNS HPF: ABNORMAL /HPF (ref 0–2)
SODIUM SERPL-SCNC: 143 MMOL/L (ref 132–146)
SP GR UR STRIP: >=1.03 (ref 1–1.03)
TRIGLYCERIDE, FASTING: 133 MG/DL (ref 0–149)
TSH SERPL-MCNC: 1.22 UIU/ML (ref 0.27–4.2)
UROBILINOGEN UR STRIP-ACNC: 0.2 E.U./DL
VLDLC SERPL CALC-MCNC: 27 MG/DL
WBC # BLD: 7.2 E9/L (ref 4.5–11.5)
WBC #/AREA URNS HPF: ABNORMAL /HPF (ref 0–5)

## 2023-05-24 ENCOUNTER — APPOINTMENT (OUTPATIENT)
Dept: GENERAL RADIOLOGY | Age: 38
End: 2023-05-24
Payer: COMMERCIAL

## 2023-05-24 ENCOUNTER — HOSPITAL ENCOUNTER (OUTPATIENT)
Age: 38
Setting detail: OBSERVATION
Discharge: HOME | End: 2023-05-25
Attending: EMERGENCY MEDICINE | Admitting: FAMILY MEDICINE
Payer: COMMERCIAL

## 2023-05-24 DIAGNOSIS — R42 DIZZINESS: ICD-10-CM

## 2023-05-24 DIAGNOSIS — R07.9 CHEST PAIN, UNSPECIFIED TYPE: Primary | ICD-10-CM

## 2023-05-24 DIAGNOSIS — R19.7 DIARRHEA, UNSPECIFIED TYPE: ICD-10-CM

## 2023-05-24 DIAGNOSIS — R00.2 PALPITATIONS: ICD-10-CM

## 2023-05-24 LAB
ALBUMIN SERPL-MCNC: 4.5 G/DL (ref 3.5–5.2)
ALP SERPL-CCNC: 84 U/L (ref 35–104)
ALT SERPL-CCNC: 21 U/L (ref 0–32)
ANION GAP SERPL CALCULATED.3IONS-SCNC: 11 MMOL/L (ref 7–16)
AST SERPL-CCNC: 22 U/L (ref 0–31)
BASOPHILS # BLD: 0.06 E9/L (ref 0–0.2)
BASOPHILS NFR BLD: 0.6 % (ref 0–2)
BILIRUB SERPL-MCNC: 0.6 MG/DL (ref 0–1.2)
BILIRUB UR QL STRIP: NEGATIVE
BUN SERPL-MCNC: 13 MG/DL (ref 6–20)
CALCIUM SERPL-MCNC: 9.2 MG/DL (ref 8.6–10.2)
CHLORIDE SERPL-SCNC: 102 MMOL/L (ref 98–107)
CLARITY UR: CLEAR
CO2 SERPL-SCNC: 24 MMOL/L (ref 22–29)
COLOR UR: NORMAL
CREAT SERPL-MCNC: 0.9 MG/DL (ref 0.5–1)
D DIMER: <200 NG/ML DDU
EOSINOPHIL # BLD: 0.09 E9/L (ref 0.05–0.5)
EOSINOPHIL NFR BLD: 0.9 % (ref 0–6)
ERYTHROCYTE [DISTWIDTH] IN BLOOD BY AUTOMATED COUNT: 12.7 FL (ref 11.5–15)
GLUCOSE SERPL-MCNC: 91 MG/DL (ref 74–99)
GLUCOSE UR STRIP-MCNC: NEGATIVE MG/DL
HCG, URINE, POC: NEGATIVE
HCT VFR BLD AUTO: 40.8 % (ref 34–48)
HGB BLD-MCNC: 13.2 G/DL (ref 11.5–15.5)
HGB UR QL STRIP: NEGATIVE
IMM GRANULOCYTES # BLD: 0.05 E9/L
IMM GRANULOCYTES NFR BLD: 0.5 % (ref 0–5)
KETONES UR STRIP-MCNC: NEGATIVE MG/DL
LACTATE BLDV-SCNC: 0.9 MMOL/L (ref 0.5–2.2)
LEUKOCYTE ESTERASE UR QL STRIP: NEGATIVE
LYMPHOCYTES # BLD: 2.09 E9/L (ref 1.5–4)
LYMPHOCYTES NFR BLD: 21.7 % (ref 20–42)
Lab: NORMAL
MCH RBC QN AUTO: 29.1 PG (ref 26–35)
MCHC RBC AUTO-ENTMCNC: 32.4 % (ref 32–34.5)
MCV RBC AUTO: 89.9 FL (ref 80–99.9)
MONOCYTES # BLD: 0.61 E9/L (ref 0.1–0.95)
MONOCYTES NFR BLD: 6.3 % (ref 2–12)
NEGATIVE QC PASS/FAIL: NORMAL
NEUTROPHILS # BLD: 6.71 E9/L (ref 1.8–7.3)
NEUTS SEG NFR BLD: 70 % (ref 43–80)
NITRITE UR QL STRIP: NEGATIVE
PH UR STRIP: 6 [PH] (ref 5–9)
PLATELET # BLD AUTO: 303 E9/L (ref 130–450)
PMV BLD AUTO: 9.5 FL (ref 7–12)
POSITIVE QC PASS/FAIL: NORMAL
POTASSIUM SERPL-SCNC: 4 MMOL/L (ref 3.5–5)
PROT SERPL-MCNC: 7.6 G/DL (ref 6.4–8.3)
PROT UR STRIP-MCNC: NEGATIVE MG/DL
RBC # BLD AUTO: 4.54 E12/L (ref 3.5–5.5)
SODIUM SERPL-SCNC: 137 MMOL/L (ref 132–146)
SP GR UR STRIP: 1.02 (ref 1–1.03)
T4 SERPL-MCNC: 8.1 MCG/DL (ref 4.5–11.7)
TROPONIN, HIGH SENSITIVITY: <6 NG/L (ref 0–9)
TROPONIN, HIGH SENSITIVITY: <6 NG/L (ref 0–9)
TSH SERPL-MCNC: 1.15 UIU/ML (ref 0.27–4.2)
UROBILINOGEN UR STRIP-ACNC: 0.2 E.U./DL
WBC # BLD: 9.6 E9/L (ref 4.5–11.5)

## 2023-05-24 PROCEDURE — 83605 ASSAY OF LACTIC ACID: CPT

## 2023-05-24 PROCEDURE — 84436 ASSAY OF TOTAL THYROXINE: CPT

## 2023-05-24 PROCEDURE — 84484 ASSAY OF TROPONIN QUANT: CPT

## 2023-05-24 PROCEDURE — 71046 X-RAY EXAM CHEST 2 VIEWS: CPT

## 2023-05-24 PROCEDURE — 99285 EMERGENCY DEPT VISIT HI MDM: CPT

## 2023-05-24 PROCEDURE — 84443 ASSAY THYROID STIM HORMONE: CPT

## 2023-05-24 PROCEDURE — 2580000003 HC RX 258: Performed by: PHYSICIAN ASSISTANT

## 2023-05-24 PROCEDURE — 80053 COMPREHEN METABOLIC PANEL: CPT

## 2023-05-24 PROCEDURE — 81003 URINALYSIS AUTO W/O SCOPE: CPT

## 2023-05-24 PROCEDURE — 93005 ELECTROCARDIOGRAM TRACING: CPT | Performed by: PHYSICIAN ASSISTANT

## 2023-05-24 PROCEDURE — 36415 COLL VENOUS BLD VENIPUNCTURE: CPT

## 2023-05-24 PROCEDURE — 85378 FIBRIN DEGRADE SEMIQUANT: CPT

## 2023-05-24 PROCEDURE — 85025 COMPLETE CBC W/AUTO DIFF WBC: CPT

## 2023-05-24 RX ORDER — 0.9 % SODIUM CHLORIDE 0.9 %
1000 INTRAVENOUS SOLUTION INTRAVENOUS ONCE
Status: COMPLETED | OUTPATIENT
Start: 2023-05-24 | End: 2023-05-25

## 2023-05-24 RX ADMIN — SODIUM CHLORIDE 1000 ML: 9 INJECTION, SOLUTION INTRAVENOUS at 22:24

## 2023-05-24 ASSESSMENT — LIFESTYLE VARIABLES
HOW OFTEN DO YOU HAVE A DRINK CONTAINING ALCOHOL: MONTHLY OR LESS
HOW MANY STANDARD DRINKS CONTAINING ALCOHOL DO YOU HAVE ON A TYPICAL DAY: PATIENT DOES NOT DRINK

## 2023-05-24 ASSESSMENT — PAIN SCALES - GENERAL: PAINLEVEL_OUTOF10: 5

## 2023-05-24 ASSESSMENT — PAIN DESCRIPTION - LOCATION: LOCATION: CHEST;JAW

## 2023-05-24 ASSESSMENT — PAIN DESCRIPTION - ORIENTATION: ORIENTATION: RIGHT;LEFT

## 2023-05-24 ASSESSMENT — PAIN - FUNCTIONAL ASSESSMENT: PAIN_FUNCTIONAL_ASSESSMENT: 0-10

## 2023-05-24 ASSESSMENT — PAIN DESCRIPTION - PAIN TYPE: TYPE: ACUTE PAIN

## 2023-05-25 ENCOUNTER — APPOINTMENT (OUTPATIENT)
Dept: NUCLEAR MEDICINE | Age: 38
End: 2023-05-25
Payer: COMMERCIAL

## 2023-05-25 ENCOUNTER — APPOINTMENT (OUTPATIENT)
Dept: NON INVASIVE DIAGNOSTICS | Age: 38
End: 2023-05-25
Payer: COMMERCIAL

## 2023-05-25 VITALS
BODY MASS INDEX: 30.79 KG/M2 | HEART RATE: 77 BPM | OXYGEN SATURATION: 100 % | SYSTOLIC BLOOD PRESSURE: 118 MMHG | TEMPERATURE: 98.1 F | DIASTOLIC BLOOD PRESSURE: 90 MMHG | RESPIRATION RATE: 14 BRPM | WEIGHT: 202.5 LBS

## 2023-05-25 PROBLEM — R07.9 CHEST PAIN: Status: ACTIVE | Noted: 2023-05-25

## 2023-05-25 PROBLEM — R42 DIZZINESS: Status: ACTIVE | Noted: 2023-05-25

## 2023-05-25 LAB
CHOLESTEROL, TOTAL: 198 MG/DL (ref 0–199)
HCG SERPL QL: NEGATIVE
HDLC SERPL-MCNC: 35 MG/DL
LDLC SERPL CALC-MCNC: 137 MG/DL (ref 0–99)
LV EF: 63 %
LV EF: 77 %
LVEF MODALITY: NORMAL
LVEF MODALITY: NORMAL
TRIGL SERPL-MCNC: 132 MG/DL (ref 0–149)
TROPONIN, HIGH SENSITIVITY: <6 NG/L (ref 0–9)
VLDLC SERPL CALC-MCNC: 26 MG/DL

## 2023-05-25 PROCEDURE — G0378 HOSPITAL OBSERVATION PER HR: HCPCS

## 2023-05-25 PROCEDURE — 93017 CV STRESS TEST TRACING ONLY: CPT

## 2023-05-25 PROCEDURE — 6370000000 HC RX 637 (ALT 250 FOR IP): Performed by: PHYSICIAN ASSISTANT

## 2023-05-25 PROCEDURE — 93005 ELECTROCARDIOGRAM TRACING: CPT | Performed by: EMERGENCY MEDICINE

## 2023-05-25 PROCEDURE — 93306 TTE W/DOPPLER COMPLETE: CPT

## 2023-05-25 PROCEDURE — 6360000002 HC RX W HCPCS: Performed by: FAMILY MEDICINE

## 2023-05-25 PROCEDURE — 99222 1ST HOSP IP/OBS MODERATE 55: CPT | Performed by: INTERNAL MEDICINE

## 2023-05-25 PROCEDURE — 2580000003 HC RX 258: Performed by: FAMILY MEDICINE

## 2023-05-25 PROCEDURE — 6360000002 HC RX W HCPCS: Performed by: PHYSICIAN ASSISTANT

## 2023-05-25 PROCEDURE — 93016 CV STRESS TEST SUPVJ ONLY: CPT | Performed by: INTERNAL MEDICINE

## 2023-05-25 PROCEDURE — 78452 HT MUSCLE IMAGE SPECT MULT: CPT

## 2023-05-25 PROCEDURE — 93018 CV STRESS TEST I&R ONLY: CPT | Performed by: INTERNAL MEDICINE

## 2023-05-25 PROCEDURE — A9500 TC99M SESTAMIBI: HCPCS | Performed by: RADIOLOGY

## 2023-05-25 PROCEDURE — 96375 TX/PRO/DX INJ NEW DRUG ADDON: CPT

## 2023-05-25 PROCEDURE — 6360000002 HC RX W HCPCS: Performed by: INTERNAL MEDICINE

## 2023-05-25 PROCEDURE — 3430000000 HC RX DIAGNOSTIC RADIOPHARMACEUTICAL: Performed by: RADIOLOGY

## 2023-05-25 PROCEDURE — 84484 ASSAY OF TROPONIN QUANT: CPT

## 2023-05-25 PROCEDURE — 6370000000 HC RX 637 (ALT 250 FOR IP): Performed by: FAMILY MEDICINE

## 2023-05-25 PROCEDURE — 96374 THER/PROPH/DIAG INJ IV PUSH: CPT

## 2023-05-25 PROCEDURE — 84703 CHORIONIC GONADOTROPIN ASSAY: CPT

## 2023-05-25 PROCEDURE — 36415 COLL VENOUS BLD VENIPUNCTURE: CPT

## 2023-05-25 PROCEDURE — 80061 LIPID PANEL: CPT

## 2023-05-25 RX ORDER — TETRAKIS(2-METHOXYISOBUTYLISOCYANIDE)COPPER(I) TETRAFLUOROBORATE 1 MG/ML
30 INJECTION, POWDER, LYOPHILIZED, FOR SOLUTION INTRAVENOUS
Status: COMPLETED | OUTPATIENT
Start: 2023-05-25 | End: 2023-05-25

## 2023-05-25 RX ORDER — SODIUM CHLORIDE 9 MG/ML
INJECTION, SOLUTION INTRAVENOUS PRN
Status: DISCONTINUED | OUTPATIENT
Start: 2023-05-25 | End: 2023-05-25 | Stop reason: HOSPADM

## 2023-05-25 RX ORDER — ATORVASTATIN CALCIUM 40 MG/1
40 TABLET, FILM COATED ORAL NIGHTLY
Qty: 90 TABLET | Refills: 0 | Status: SHIPPED | OUTPATIENT
Start: 2023-05-25

## 2023-05-25 RX ORDER — ASPIRIN 81 MG/1
324 TABLET, CHEWABLE ORAL ONCE
Status: COMPLETED | OUTPATIENT
Start: 2023-05-25 | End: 2023-05-25

## 2023-05-25 RX ORDER — ATORVASTATIN CALCIUM 40 MG/1
40 TABLET, FILM COATED ORAL NIGHTLY
Status: DISCONTINUED | OUTPATIENT
Start: 2023-05-25 | End: 2023-05-25 | Stop reason: HOSPADM

## 2023-05-25 RX ORDER — ACETAMINOPHEN 650 MG/1
650 SUPPOSITORY RECTAL EVERY 6 HOURS PRN
Status: DISCONTINUED | OUTPATIENT
Start: 2023-05-25 | End: 2023-05-25 | Stop reason: HOSPADM

## 2023-05-25 RX ORDER — FENTANYL CITRATE 0.05 MG/ML
25 INJECTION, SOLUTION INTRAMUSCULAR; INTRAVENOUS ONCE
Status: COMPLETED | OUTPATIENT
Start: 2023-05-25 | End: 2023-05-25

## 2023-05-25 RX ORDER — NITROGLYCERIN 0.4 MG/1
0.4 TABLET SUBLINGUAL EVERY 5 MIN PRN
Status: DISCONTINUED | OUTPATIENT
Start: 2023-05-25 | End: 2023-05-25 | Stop reason: HOSPADM

## 2023-05-25 RX ORDER — SODIUM CHLORIDE 0.9 % (FLUSH) 0.9 %
5-40 SYRINGE (ML) INJECTION EVERY 12 HOURS SCHEDULED
Status: DISCONTINUED | OUTPATIENT
Start: 2023-05-25 | End: 2023-05-25 | Stop reason: HOSPADM

## 2023-05-25 RX ORDER — TETRAKIS(2-METHOXYISOBUTYLISOCYANIDE)COPPER(I) TETRAFLUOROBORATE 1 MG/ML
10 INJECTION, POWDER, LYOPHILIZED, FOR SOLUTION INTRAVENOUS
Status: COMPLETED | OUTPATIENT
Start: 2023-05-25 | End: 2023-05-25

## 2023-05-25 RX ORDER — SODIUM CHLORIDE 0.9 % (FLUSH) 0.9 %
5-40 SYRINGE (ML) INJECTION PRN
Status: DISCONTINUED | OUTPATIENT
Start: 2023-05-25 | End: 2023-05-25 | Stop reason: HOSPADM

## 2023-05-25 RX ORDER — ASPIRIN 81 MG/1
81 TABLET, CHEWABLE ORAL DAILY
Status: DISCONTINUED | OUTPATIENT
Start: 2023-05-26 | End: 2023-05-25 | Stop reason: HOSPADM

## 2023-05-25 RX ORDER — ACETAMINOPHEN 325 MG/1
650 TABLET ORAL EVERY 6 HOURS PRN
Status: DISCONTINUED | OUTPATIENT
Start: 2023-05-25 | End: 2023-05-25 | Stop reason: HOSPADM

## 2023-05-25 RX ORDER — LORAZEPAM 2 MG/ML
0.5 INJECTION INTRAMUSCULAR ONCE
Status: COMPLETED | OUTPATIENT
Start: 2023-05-25 | End: 2023-05-25

## 2023-05-25 RX ORDER — ENOXAPARIN SODIUM 100 MG/ML
40 INJECTION SUBCUTANEOUS DAILY
Status: DISCONTINUED | OUTPATIENT
Start: 2023-05-25 | End: 2023-05-25 | Stop reason: HOSPADM

## 2023-05-25 RX ADMIN — ENOXAPARIN SODIUM 40 MG: 100 INJECTION SUBCUTANEOUS at 07:47

## 2023-05-25 RX ADMIN — Medication 30 MILLICURIE: at 10:33

## 2023-05-25 RX ADMIN — ASPIRIN 324 MG: 81 TABLET, CHEWABLE ORAL at 00:46

## 2023-05-25 RX ADMIN — LORAZEPAM 0.5 MG: 2 INJECTION INTRAMUSCULAR; INTRAVENOUS at 00:48

## 2023-05-25 RX ADMIN — REGADENOSON 0.4 MG: 0.08 INJECTION, SOLUTION INTRAVENOUS at 10:28

## 2023-05-25 RX ADMIN — ATORVASTATIN CALCIUM 40 MG: 40 TABLET, FILM COATED ORAL at 02:13

## 2023-05-25 RX ADMIN — Medication 10 MILLICURIE: at 09:30

## 2023-05-25 RX ADMIN — FENTANYL CITRATE 25 MCG: 0.05 INJECTION, SOLUTION INTRAMUSCULAR; INTRAVENOUS at 00:47

## 2023-05-25 RX ADMIN — SODIUM CHLORIDE, PRESERVATIVE FREE 10 ML: 5 INJECTION INTRAVENOUS at 07:47

## 2023-05-25 ASSESSMENT — PAIN DESCRIPTION - LOCATION: LOCATION: CHEST

## 2023-05-25 ASSESSMENT — PAIN SCALES - GENERAL
PAINLEVEL_OUTOF10: 0
PAINLEVEL_OUTOF10: 3
PAINLEVEL_OUTOF10: 3

## 2023-05-25 ASSESSMENT — PAIN DESCRIPTION - DESCRIPTORS: DESCRIPTORS: ACHING

## 2023-05-25 NOTE — PROGRESS NOTES
CLINICAL PHARMACY NOTE: MEDS TO BEDS    Total # of Prescriptions Filled: 1   The following medications were delivered to the patient:  Atorvastatin 40 mg    Additional Documentation:

## 2023-05-25 NOTE — H&P
Medication Sig Start Date End Date Taking? Authorizing Provider   EMGALITY 120 MG/ML SOAJ inject 1 milliliter ( 120 milligrams ) subcutaneously and INTO TH. ..  (REFER TO PRESCRIPTION NOTES). 3/21/23   Historical Provider, MD   tiZANidine (ZANAFLEX) 4 MG tablet Take 0.5 tablets by mouth nightly as needed (spasm) 4/12/23   Mitch Sims MD   zonisamide (ZONEGRAN) 100 MG capsule take 1 capsule by mouth once daily 8/4/22   Historical Provider, MD   azelastine (ASTELIN) 0.1 % nasal spray 2 sprays by Nasal route in the morning and 2 sprays before bedtime. Use in each nostril as directed. 8/10/22   Tone Cap, DO   fluticasone (FLONASE) 50 MCG/ACT nasal spray 1 spray by Each Nostril route in the morning. 8/10/22   Tone Cap, DO   naratriptan (AMERGE) 2.5 MG tablet Take 1 tablet by mouth as needed 2/23/22   Historical Provider, MD Garcia Fuelling 70 MG/ML SOAJ Inject 140 mg into the skin every 30 days Inject one dose subcutaneously every 30 days 1/25/22 1/25/23  Renee Lyn MD   Levonorgestrel (MIRENA, 52 MG, IU) by IntraUTERine route    Historical Provider, MD   loratadine (CLARITIN) 10 MG tablet Take 1 tablet by mouth daily    Historical Provider, MD       Allergies:    Vyvanse [lisdexamfetamine]    Social History:    reports that she quit smoking about 5 years ago. Her smoking use included cigarettes. She has never used smokeless tobacco. She reports that she does not drink alcohol and does not use drugs. Family History:   family history includes Cancer in her paternal grandfather; Diabetes in her maternal grandmother; Heart Disease in her maternal grandfather; High Blood Pressure in her brother, father, maternal grandfather, maternal grandmother, and mother; High Cholesterol in her brother, father, maternal grandfather, maternal grandmother, and mother.      PHYSICAL EXAM:  Vitals:  /81   Pulse 91   Temp 97.9 °F (36.6 °C)   Resp 20   Wt 202 lb (91.6 kg)   SpO2 100%   BMI 30.71

## 2023-05-25 NOTE — DISCHARGE INSTRUCTIONS
Consider starting a fish oil supplement daily. Your HDL is low -- 35    Your LDL is high -- 137      Your information:  Name: Sandy De La Torre  : 1985    Your instructions:    YOU ARE BEING DISCHARGED HOME. PLEASE MAKE AND KEEP YOUR FOLLOW UP APPOINTMENTS. What to do after you leave the hospital:    Recommended diet: regular diet    Recommended activity: activity as tolerated    IF YOU EXPERIENCE ANY OF THE FOLLOWING SYMPTOMS, CHEST PAIN, SHORTNESS OF BREATH, COUGHING UP BLOOD OR BLOODY SPUTUM, STOMACH PAIN OR CRAMPING, DARK, TARRY STOOLS, LOSS OF APPETITE, GENERAL NOT FEELING WELL, SIGNS AND SYMPTOMS OF INFECTION LIKE FEVER AND OR CHILLS, PLEASE CALL DR Elayne Orlando MD OR RETURN TO THE EMERGENCY ROOM. The following personal items were collected during your admission and were returned to you:    Belongings  Dental Appliances: None  Vision - Corrective Lenses: None  Hearing Aid: None  Clothing: Socks, Undergarments, Pants, Footwear, Shirt  Jewelry: Earrings  Body Piercings Removed: Yes (back)  Electronic Devices: Cell Phone  Weapons (Notify Protective Services/Security): None  Other Valuables: Wallet, Keys, Money  Home Medications: None  Valuables Given To: Patient  Provide Name(s) of Who Valuable(s) Were Given To: na  Responsible person(s) in the waiting room: na  Patient approves for provider to speak to responsible person post operatively: Yes    Information obtained by:  By signing below, I understand that if any problems occur once I leave the hospital I am to contact  Elayne Orlando MD or go to emergency room. I understand and acknowledge receipt of the instructions indicated above.

## 2023-05-25 NOTE — DISCHARGE INSTR - DIET
Good nutrition is important when healing from an illness, injury, or surgery. Follow any nutrition recommendations given to you during your hospital stay. If you were given an oral nutrition supplement while in the hospital, continue to take this supplement at home. You can take it with meals, in-between meals, and/or before bedtime. These supplements can be purchased at most local grocery stores, pharmacies, and chain [employer]-stores. If you have any questions about your diet or nutrition, call the hospital and ask for the dietitian.       Regular diet as prior

## 2023-05-25 NOTE — ED PROVIDER NOTES
Shared LEONARDO-ED Attending Visit. CC: No               [facility]  ED  Encounter Note  Admit Date/RoomTime: 2023  8:25 PM  ED Room:   NAME: Sonja Sarkar  : 1985  MRN: [id number]  PCP: Man Wong MD    CHIEF COMPLAINT     Diarrhea (Sudden bout diarrhea/ then chest pain into neck/ lt rt side weakness/ dizziness at restaurant)    HISTORY OF PRESENT ILLNESS        Sonja Sarkar is a 30 y.o. female who presents to the ED by private vehicle for diarrhea, chest pain and lightheadedness, beginning just prior to arrival. The complaint has been stable and are moderate in severity. The patient states that she was at [address] when she had sudden onset of stools and the fact are well controlled bowels. She states that stools were watery and describes fairly large amount. After that, the patient proceeded to restaurant for dinner. Reports as she was walking into the restaurant she developed a squeezing left-sided chest pain that radiated to her jaw. The patient reports she felt dizzy and lightheaded and thought she was going to pass out. She states that she tried to sit and rest for a while and had some intermittent heart palpitations and more lightheadedness prompting her to come in and seek treatment. Patient did had an episode of this about a week ago. She states that after the episode a week ago she tried to follow-up with Dr. Estrada Cruz. She went to the wrong office and had to reschedule her appointment. The patient states she has seen him in the past because a couple years ago she was having intermittent chest pain and he did a stress test.  She was told at that time that everything looked good. The patient is a former smoker with hyperlipidemia. She reports a very strong history of coronary artery disease. act at the age of 47 recently. She has multiple uncles and other relatives with nonobstructive coronary disease.   At this time

## 2023-05-25 NOTE — CONSULTS
voice recognition software. Every effort was made to ensure accuracy; however, inadvertent computerized transcription errors may be present. [facility], [address] [place] [address], [place] [address] Cardiology    Electronically signed by Logan Ibrahim PA-C on 5/25/2023 at 9:03 AM      I independently interviewed and examined the patient. I have reviewed the above documentation completed by the LEONARDO. Please see my additional contributions to the HPI, physical exam, and assessment / medical decision making. 72-year-old female with morbid obesity and abnormal lipid profile who presented with palpitations, shortness of breath and atypical chest pain and stress test shows no perfusion defect. Awaiting echocardiogram to guide therapy. Review of Systems:  Cardiac: As per HPI  General: No fever, chills  Pulmonary: As per HPI  GI: No nausea, vomiting  Musculoskeletal: DE SOUZA x 4, no focal motor deficits  Skin: Intact, no rashes  Neuro/Psych: No headache or seizures    Physical Exam:  BP (!) 118/90   Pulse 77   Temp 98.1 °F (36.7 °C) (Oral)   Resp 14   Wt 202 lb 8 oz (91.9 kg)   SpO2 100%   BMI 30.79 kg/m²   Appearance: Awake, alert, no acute respiratory distress  Skin: Intact, no rash  Head: Normocephalic, atraumatic  ENMT: MMM, no rhinorrhea  Neck: Supple, no carotid bruits  Lungs: Clear to auscultation bilaterally. No wheezes, rales, or rhonchi. Cardiac: Regular rate and rhythm, +S1S2, no murmurs apparent  Abdomen: Soft, +bowel sounds  Extremities: Moves all extremities x 4, no lower extremity edema  Neurologic: No focal motor deficits apparent, normal mood and affect    Myocardial perfusion stress test May 25, 2023:  Gated SPECT left ventricular ejection fraction was calculated to be  77%, with normal wall motion. IMPRESSION:     The myocardial perfusion imaging was normal with attenuation. Overall left ventricular systolic function was normal without regional  wall motion abnormalities.      Overall low risk

## 2023-05-25 NOTE — PROCEDURES
[facility] Nuclear Stress Test:    Cardiologist: Dr. Bong Pederson MD    Date: 5/25/2023    Indications for study: Chest pain    No chest pain  No new arrhythmias  No EKG changes suggestive of stress induced ischemia  Nuclear images pending    Bong Pederson MD  [facility] ([facility]) Cardiology

## 2023-05-25 NOTE — CARE COORDINATION
Case Management Assessment  Initial Evaluation    Date/Time of Evaluation: 5/25/2023 9:21 AM  Assessment Completed by: UVALDO Loja    If patient is discharged prior to next notation, then this note serves as note for discharge by case management. Patient Name: Steve Angel                   YOB: 1985  Diagnosis: Palpitations [R00.2]  Chest pain [R07.9]  Chest pain, unspecified type [R07.9]  Diarrhea, unspecified type [R19.7]                   Date / Time: 5/24/2023  8:25 PM    Patient Admission Status: Observation   Readmission Risk (Low < 19, Mod (19-27), High > 27): No data recorded  Current PCP: Demetris Huff MD  PCP verified by ? Yes    Chart Reviewed: Yes      History Provided by: Patient  Patient Orientation: Alert and Oriented, Person, Place, Situation    Patient Cognition: Alert    Hospitalization in the last 30 days (Readmission):  No    If yes, Readmission Assessment in  Navigator will be completed. Advance Directives:      Code Status: Full Code   Patient's Primary Decision Maker is: Legal Next of Kin    Primary Decision Maker: WinchesterBill - Parent - [phone]    Primary Decision Maker: Gabriele Cifuentes - Parent - [phone]  Advance Care Planning     Healthcare Decision Maker:    Primary Decision Maker: Chapin Hernandez - Parent - [phone]    Primary Decision Maker: Gabriele Cifuentes - Parent - [phone]       Discharge Planning:    Patient lives with: her 3 children any the pts mother. House  Primary Care Giver: Self    Patient Support Systems include: Parent      Current services prior to admission: None              Type of Home Care services:  None    ADLS  Prior functional level: Independent in ADLs/IADLs  Current functional level: Independent in ADLs/IADLs    PT AM-PAC:   /24  OT AM-PAC:   /24    Family can provide assistance at DC:  Other (comment) (pt is independant at home and works full time.)    Would you like Case Management to discuss the discharge

## 2023-05-26 ENCOUNTER — TELEPHONE (OUTPATIENT)
Dept: FAMILY MEDICINE CLINIC | Age: 38
End: 2023-05-26

## 2023-05-26 LAB
EKG ATRIAL RATE: 64 BPM
EKG ATRIAL RATE: 71 BPM
EKG P AXIS: 22 DEGREES
EKG P AXIS: 35 DEGREES
EKG P-R INTERVAL: 136 MS
EKG P-R INTERVAL: 138 MS
EKG Q-T INTERVAL: 380 MS
EKG Q-T INTERVAL: 430 MS
EKG QRS DURATION: 86 MS
EKG QRS DURATION: 90 MS
EKG QTC CALCULATION (BAZETT): 412 MS
EKG QTC CALCULATION (BAZETT): 443 MS
EKG R AXIS: 35 DEGREES
EKG R AXIS: 58 DEGREES
EKG T AXIS: -17 DEGREES
EKG T AXIS: -58 DEGREES
EKG VENTRICULAR RATE: 64 BPM
EKG VENTRICULAR RATE: 71 BPM

## 2023-05-26 PROCEDURE — 93010 ELECTROCARDIOGRAM REPORT: CPT | Performed by: INTERNAL MEDICINE

## 2023-05-26 NOTE — TELEPHONE ENCOUNTER
Care Transitions Initial Follow Up Call    Outreach made within 2 business days of discharge: Yes    Patient: Steve Angel Patient : 1985   MRN: [id number]  Reason for Admission: There are no discharge diagnoses documented for the most recent discharge. Discharge Date: 23       Spoke with: patient    Discharge department/facility: [address] [place] [address] Interactive Patient Contact:  Was patient able to fill all prescriptions: Yes  Was patient instructed to bring all medications to the follow-up visit: Yes  Is patient taking all medications as directed in the discharge summary? Yes  Does patient understand their discharge instructions: Yes  Does patient have questions or concerns that need addressed prior to 7-14 day follow up office visit: no    Scheduled appointment with PCP within 7-14 days--appt scheduled 23. Follow Up  No future appointments.     Abilio Alston

## 2023-06-01 ENCOUNTER — OFFICE VISIT (OUTPATIENT)
Dept: FAMILY MEDICINE CLINIC | Age: 38
End: 2023-06-01
Payer: COMMERCIAL

## 2023-06-01 DIAGNOSIS — R07.9 CHEST PAIN, UNSPECIFIED TYPE: ICD-10-CM

## 2023-06-01 DIAGNOSIS — E78.5 HYPERLIPIDEMIA, UNSPECIFIED HYPERLIPIDEMIA TYPE: Primary | ICD-10-CM

## 2023-06-01 PROCEDURE — 99214 OFFICE O/P EST MOD 30 MIN: CPT | Performed by: FAMILY MEDICINE

## 2023-06-02 ENCOUNTER — OFFICE VISIT (OUTPATIENT)
Dept: FAMILY MEDICINE CLINIC | Age: 38
End: 2023-06-02
Payer: COMMERCIAL

## 2023-06-02 VITALS
HEART RATE: 75 BPM | SYSTOLIC BLOOD PRESSURE: 110 MMHG | TEMPERATURE: 98 F | HEIGHT: 68 IN | WEIGHT: 206 LBS | DIASTOLIC BLOOD PRESSURE: 76 MMHG | OXYGEN SATURATION: 99 % | BODY MASS INDEX: 31.22 KG/M2

## 2023-06-02 DIAGNOSIS — F32.A ANXIETY AND DEPRESSION: ICD-10-CM

## 2023-06-02 DIAGNOSIS — F41.9 ANXIETY AND DEPRESSION: ICD-10-CM

## 2023-06-02 DIAGNOSIS — R07.9 CHEST PAIN, UNSPECIFIED TYPE: Primary | ICD-10-CM

## 2023-06-02 DIAGNOSIS — E78.5 HYPERLIPIDEMIA, UNSPECIFIED HYPERLIPIDEMIA TYPE: ICD-10-CM

## 2023-06-02 PROCEDURE — 99204 OFFICE O/P NEW MOD 45 MIN: CPT | Performed by: FAMILY MEDICINE

## 2023-06-02 ASSESSMENT — ENCOUNTER SYMPTOMS
GASTROINTESTINAL NEGATIVE: 1
SHORTNESS OF BREATH: 0
RESPIRATORY NEGATIVE: 1
EYES NEGATIVE: 1
ALLERGIC/IMMUNOLOGIC NEGATIVE: 1

## 2023-06-02 NOTE — PATIENT INSTRUCTIONS
Continue present treatment  Low-sodium low-fat diet  Go to the ER if any worse  Follow-up with a cardiologist  Get the lab work done before the next visit  Return to clinic earlier if any problems

## 2023-06-02 NOTE — PROGRESS NOTES
124/84        Physical Exam  Constitutional:       Appearance: She is well-developed. HENT:      Head: Normocephalic and atraumatic. Eyes:      Conjunctiva/sclera: Conjunctivae normal.      Pupils: Pupils are equal, round, and reactive to light. Cardiovascular:      Rate and Rhythm: Normal rate and regular rhythm. Heart sounds: Normal heart sounds. Pulmonary:      Effort: Pulmonary effort is normal.      Breath sounds: Normal breath sounds. Abdominal:      General: Bowel sounds are normal.      Palpations: Abdomen is soft. Musculoskeletal:         General: Normal range of motion. Cervical back: Normal range of motion and neck supple. Skin:     General: Skin is warm and dry. Neurological:      Mental Status: She is alert and oriented to person, place, and time. Psychiatric:         Thought Content:  Thought content normal.          Labs :    Lab Results   Component Value Date    WBC 9.6 05/24/2023    HGB 13.2 05/24/2023    HCT 40.8 05/24/2023     05/24/2023    CHOL 198 05/25/2023    TRIG 132 05/25/2023    HDL 35 05/25/2023    ALT 21 05/24/2023    AST 22 05/24/2023     05/24/2023    K 4.0 05/24/2023     05/24/2023    CREATININE 0.9 05/24/2023    BUN 13 05/24/2023    CO2 24 05/24/2023    TSH 1.150 05/24/2023    GLUF 93 03/16/2023    LABA1C 5.0 05/14/2020     Lab Results   Component Value Date/Time    COLORU DKYELLOW 05/24/2023 09:43 PM    NITRU Negative 05/24/2023 09:43 PM    GLUCOSEU Negative 05/24/2023 09:43 PM    KETUA Negative 05/24/2023 09:43 PM    UROBILINOGEN 0.2 05/24/2023 09:43 PM    BILIRUBINUR Negative 05/24/2023 09:43 PM     No results found for: PSA, CEA, , DH9376,       Controlled Substances Monitoring:                                    ASSESSMENT     Patient Active Problem List    Diagnosis Date Noted    Idiopathic peripheral neuropathy 08/11/2022    Pain in both lower legs 08/11/2022    Hyperlipidemia 08/11/2022    Migraine without status

## 2023-06-05 ENCOUNTER — NURSE ONLY (OUTPATIENT)
Dept: CARDIOLOGY CLINIC | Age: 38
End: 2023-06-05

## 2023-06-05 NOTE — PROGRESS NOTES
Patient was in today and had 14 day ZIO XT placed per Dr. Bishop Chandra. Patient given instructions, questions answered. Patient verbalized understanding. Monitor [id number]. ELIF Morfin

## 2023-06-27 ENCOUNTER — OFFICE VISIT (OUTPATIENT)
Dept: CARDIOLOGY CLINIC | Age: 38
End: 2023-06-27

## 2023-06-27 VITALS
HEART RATE: 64 BPM | BODY MASS INDEX: 30.92 KG/M2 | RESPIRATION RATE: 16 BRPM | SYSTOLIC BLOOD PRESSURE: 106 MMHG | HEIGHT: 68 IN | DIASTOLIC BLOOD PRESSURE: 62 MMHG | WEIGHT: 204 LBS

## 2023-06-27 DIAGNOSIS — R07.9 CHEST PAIN, UNSPECIFIED TYPE: Primary | ICD-10-CM

## 2023-06-28 DIAGNOSIS — E78.5 HYPERLIPIDEMIA, UNSPECIFIED HYPERLIPIDEMIA TYPE: ICD-10-CM

## 2023-06-28 DIAGNOSIS — R07.9 CHEST PAIN, UNSPECIFIED TYPE: ICD-10-CM

## 2023-06-28 LAB
ALBUMIN SERPL-MCNC: 4.2 G/DL (ref 3.5–5.2)
ALP SERPL-CCNC: 87 U/L (ref 35–104)
ALT SERPL-CCNC: 14 U/L (ref 0–32)
ANION GAP SERPL CALCULATED.3IONS-SCNC: 10 MMOL/L (ref 7–16)
AST SERPL-CCNC: 14 U/L (ref 0–31)
BASOPHILS # BLD: 0.03 E9/L (ref 0–0.2)
BASOPHILS NFR BLD: 0.4 % (ref 0–2)
BILIRUB SERPL-MCNC: 0.3 MG/DL (ref 0–1.2)
BUN SERPL-MCNC: 9 MG/DL (ref 6–20)
CALCIUM SERPL-MCNC: 8.7 MG/DL (ref 8.6–10.2)
CHLORIDE SERPL-SCNC: 107 MMOL/L (ref 98–107)
CHOLESTEROL, TOTAL: 102 MG/DL (ref 0–199)
CO2 SERPL-SCNC: 23 MMOL/L (ref 22–29)
CREAT SERPL-MCNC: 0.8 MG/DL (ref 0.5–1)
EOSINOPHIL # BLD: 0.07 E9/L (ref 0.05–0.5)
EOSINOPHIL NFR BLD: 1 % (ref 0–6)
ERYTHROCYTE [DISTWIDTH] IN BLOOD BY AUTOMATED COUNT: 13.2 FL (ref 11.5–15)
GLUCOSE SERPL-MCNC: 97 MG/DL (ref 74–99)
HCT VFR BLD AUTO: 42.8 % (ref 34–48)
HDLC SERPL-MCNC: 38 MG/DL
HGB BLD-MCNC: 12.9 G/DL (ref 11.5–15.5)
IMM GRANULOCYTES # BLD: 0.03 E9/L
IMM GRANULOCYTES NFR BLD: 0.4 % (ref 0–5)
LDLC SERPL CALC-MCNC: 50 MG/DL (ref 0–99)
LYMPHOCYTES # BLD: 1.94 E9/L (ref 1.5–4)
LYMPHOCYTES NFR BLD: 28.5 % (ref 20–42)
MCH RBC QN AUTO: 28.4 PG (ref 26–35)
MCHC RBC AUTO-ENTMCNC: 30.1 % (ref 32–34.5)
MCV RBC AUTO: 94.3 FL (ref 80–99.9)
MONOCYTES # BLD: 0.39 E9/L (ref 0.1–0.95)
MONOCYTES NFR BLD: 5.7 % (ref 2–12)
NEUTROPHILS # BLD: 4.34 E9/L (ref 1.8–7.3)
NEUTS SEG NFR BLD: 64 % (ref 43–80)
PLATELET # BLD AUTO: 262 E9/L (ref 130–450)
PMV BLD AUTO: 10 FL (ref 7–12)
POTASSIUM SERPL-SCNC: 4.6 MMOL/L (ref 3.5–5)
PROT SERPL-MCNC: 6.9 G/DL (ref 6.4–8.3)
RBC # BLD AUTO: 4.54 E12/L (ref 3.5–5.5)
SODIUM SERPL-SCNC: 140 MMOL/L (ref 132–146)
TRIGL SERPL-MCNC: 69 MG/DL (ref 0–149)
VLDLC SERPL CALC-MCNC: 14 MG/DL
WBC # BLD: 6.8 E9/L (ref 4.5–11.5)

## 2023-06-30 ENCOUNTER — OFFICE VISIT (OUTPATIENT)
Dept: FAMILY MEDICINE CLINIC | Age: 38
End: 2023-06-30
Payer: COMMERCIAL

## 2023-06-30 VITALS
OXYGEN SATURATION: 98 % | SYSTOLIC BLOOD PRESSURE: 110 MMHG | TEMPERATURE: 97.6 F | HEART RATE: 72 BPM | BODY MASS INDEX: 31.22 KG/M2 | DIASTOLIC BLOOD PRESSURE: 72 MMHG | HEIGHT: 68 IN | WEIGHT: 206 LBS

## 2023-06-30 DIAGNOSIS — Z86.69 HISTORY OF MIGRAINE: Chronic | ICD-10-CM

## 2023-06-30 DIAGNOSIS — M41.9 SCOLIOSIS, UNSPECIFIED SCOLIOSIS TYPE, UNSPECIFIED SPINAL REGION: ICD-10-CM

## 2023-06-30 DIAGNOSIS — E78.5 HYPERLIPIDEMIA, UNSPECIFIED HYPERLIPIDEMIA TYPE: Primary | ICD-10-CM

## 2023-06-30 PROCEDURE — 99214 OFFICE O/P EST MOD 30 MIN: CPT | Performed by: FAMILY MEDICINE

## 2023-06-30 RX ORDER — ATORVASTATIN CALCIUM 40 MG/1
40 TABLET, FILM COATED ORAL NIGHTLY
Qty: 90 TABLET | Refills: 0 | Status: SHIPPED | OUTPATIENT
Start: 2023-06-30

## 2023-06-30 ASSESSMENT — ENCOUNTER SYMPTOMS
BACK PAIN: 1
ALLERGIC/IMMUNOLOGIC NEGATIVE: 1
RESPIRATORY NEGATIVE: 1
GASTROINTESTINAL NEGATIVE: 1
EYES NEGATIVE: 1

## 2023-07-14 DIAGNOSIS — R42 DIZZINESS: ICD-10-CM

## 2023-07-14 NOTE — RESULT ENCOUNTER NOTE
Braulio Malone ordered this inpatient, but it is a Karl patient in office. Can we please have results forwarded to him for review. Thanks!       [place]

## 2023-10-07 ENCOUNTER — HOSPITAL ENCOUNTER (EMERGENCY)
Age: 38
Discharge: HOME | End: 2023-10-07
Payer: COMMERCIAL

## 2023-10-07 VITALS
SYSTOLIC BLOOD PRESSURE: 124 MMHG | DIASTOLIC BLOOD PRESSURE: 87 MMHG | RESPIRATION RATE: 18 BRPM | HEART RATE: 71 BPM | WEIGHT: 200 LBS | BODY MASS INDEX: 30.41 KG/M2 | TEMPERATURE: 98.2 F | OXYGEN SATURATION: 100 %

## 2023-10-07 DIAGNOSIS — K04.7 DENTAL INFECTION: Primary | ICD-10-CM

## 2023-10-07 PROCEDURE — 99211 OFF/OP EST MAY X REQ PHY/QHP: CPT

## 2023-10-07 RX ORDER — AMOXICILLIN 500 MG/1
500 CAPSULE ORAL 3 TIMES DAILY
Qty: 30 CAPSULE | Refills: 0 | Status: SHIPPED | OUTPATIENT
Start: 2023-10-07 | End: 2023-10-17

## 2023-10-07 ASSESSMENT — PAIN - FUNCTIONAL ASSESSMENT
PAIN_FUNCTIONAL_ASSESSMENT: 0-10
PAIN_FUNCTIONAL_ASSESSMENT: NONE - DENIES PAIN

## 2023-10-07 ASSESSMENT — PAIN SCALES - GENERAL: PAINLEVEL_OUTOF10: 5

## 2023-10-07 ASSESSMENT — PAIN DESCRIPTION - LOCATION: LOCATION: TEETH

## 2023-10-07 NOTE — ED PROVIDER NOTES
HPI: Ruby Alvarez 30 y.o. female with a past medical history of   Past Medical History:   Diagnosis Date    ADHD (attention deficit hyperactivity disorder)     Complication of anesthesia     states spinal did not last entire case    Ethmoid sinusitis 2020    H/O cardiovascular stress test 2023    Nuclear Lexiscan Stress Test    History of migraine 2020    Migraine without aura and without status migrainosus, not intractable 2020    Postpartum depression     with first child    Scoliosis     Thrombophilia ([address])     presents with a complaint of dental pain. The patient said that her tooth broke off a few days ago like pieces of the tooth and she has been now having some pain and pressure on the right cheek and some slight swelling there. She said that she has a dentist but he keeps canceling appointments on her and she said he keeps going on out of town she said she has been trying to get her teeth worked on for 2 years. Does not have a fever does not have a sore throat does not have any nasal congestion or other complaints. Review of Systems:   Pertinent positives and negatives are stated within HPI, all other systems reviewed and are negative.         --------------------------------------------- PAST HISTORY ---------------------------------------------  Past Medical History:  has a past medical history of ADHD (attention deficit hyperactivity disorder), Complication of anesthesia, Ethmoid sinusitis, H/O cardiovascular stress test, History of migraine, Migraine without aura and without status migrainosus, not intractable, Postpartum depression, Scoliosis, and Thrombophilia ([address] Past Surgical History:  has a past surgical history that includes  section; Cholecystectomy; Abdomen surgery; and Mole removal (Right, 2022). Social History:  reports that she quit smoking about 5 years ago. Her smoking use included cigarettes.  She has never used smokeless tobacco. She reports

## 2023-10-11 DIAGNOSIS — Z86.69 HISTORY OF MIGRAINE: Chronic | ICD-10-CM

## 2023-10-11 DIAGNOSIS — M41.9 SCOLIOSIS, UNSPECIFIED SCOLIOSIS TYPE, UNSPECIFIED SPINAL REGION: ICD-10-CM

## 2023-10-11 DIAGNOSIS — E78.5 HYPERLIPIDEMIA, UNSPECIFIED HYPERLIPIDEMIA TYPE: ICD-10-CM

## 2023-10-11 LAB
ABSOLUTE IMMATURE GRANULOCYTE: 0.03 K/UL (ref 0–0.58)
ALBUMIN SERPL-MCNC: 4.4 G/DL (ref 3.5–5.2)
ALP BLD-CCNC: 97 U/L (ref 35–104)
ALT SERPL-CCNC: 29 U/L (ref 0–32)
ANION GAP SERPL CALCULATED.3IONS-SCNC: 13 MMOL/L (ref 7–16)
AST SERPL-CCNC: 24 U/L (ref 0–31)
BASOPHILS ABSOLUTE: 0.03 K/UL (ref 0–0.2)
BASOPHILS RELATIVE PERCENT: 0 % (ref 0–2)
BILIRUB SERPL-MCNC: 0.6 MG/DL (ref 0–1.2)
BUN BLDV-MCNC: 12 MG/DL (ref 6–20)
CALCIUM SERPL-MCNC: 8.9 MG/DL (ref 8.6–10.2)
CHLORIDE BLD-SCNC: 103 MMOL/L (ref 98–107)
CHOLESTEROL: 143 MG/DL
CO2: 21 MMOL/L (ref 22–29)
CREAT SERPL-MCNC: 0.9 MG/DL (ref 0.5–1)
EOSINOPHILS ABSOLUTE: 0.12 K/UL (ref 0.05–0.5)
EOSINOPHILS RELATIVE PERCENT: 2 % (ref 0–6)
GFR SERPL CREATININE-BSD FRML MDRD: >60 ML/MIN/1.73M2
GLUCOSE BLD-MCNC: 87 MG/DL (ref 74–99)
HCT VFR BLD CALC: 44 % (ref 34–48)
HDLC SERPL-MCNC: 42 MG/DL
HEMOGLOBIN: 13.6 G/DL (ref 11.5–15.5)
IMMATURE GRANULOCYTES: 0 % (ref 0–5)
LDL CHOLESTEROL: 76 MG/DL
LYMPHOCYTES ABSOLUTE: 2.14 K/UL (ref 1.5–4)
LYMPHOCYTES RELATIVE PERCENT: 30 % (ref 20–42)
MCH RBC QN AUTO: 28.6 PG (ref 26–35)
MCHC RBC AUTO-ENTMCNC: 30.9 G/DL (ref 32–34.5)
MCV RBC AUTO: 92.6 FL (ref 80–99.9)
MONOCYTES ABSOLUTE: 0.56 K/UL (ref 0.1–0.95)
MONOCYTES RELATIVE PERCENT: 8 % (ref 2–12)
NEUTROPHILS ABSOLUTE: 4.27 K/UL (ref 1.8–7.3)
NEUTROPHILS RELATIVE PERCENT: 60 % (ref 43–80)
PDW BLD-RTO: 12.9 % (ref 11.5–15)
PLATELET # BLD: 245 K/UL (ref 130–450)
PMV BLD AUTO: 10.2 FL (ref 7–12)
POTASSIUM SERPL-SCNC: 4.5 MMOL/L (ref 3.5–5)
RBC # BLD: 4.75 M/UL (ref 3.5–5.5)
SODIUM BLD-SCNC: 137 MMOL/L (ref 132–146)
TOTAL PROTEIN: 7.6 G/DL (ref 6.4–8.3)
TRIGL SERPL-MCNC: 123 MG/DL
VLDLC SERPL CALC-MCNC: 25 MG/DL
WBC # BLD: 7.2 K/UL (ref 4.5–11.5)

## 2023-10-13 ENCOUNTER — OFFICE VISIT (OUTPATIENT)
Dept: FAMILY MEDICINE CLINIC | Age: 38
End: 2023-10-13

## 2023-10-13 VITALS
DIASTOLIC BLOOD PRESSURE: 66 MMHG | TEMPERATURE: 97.4 F | WEIGHT: 205 LBS | OXYGEN SATURATION: 99 % | HEART RATE: 63 BPM | BODY MASS INDEX: 31.17 KG/M2 | SYSTOLIC BLOOD PRESSURE: 104 MMHG

## 2023-10-13 DIAGNOSIS — E78.5 HYPERLIPIDEMIA, UNSPECIFIED HYPERLIPIDEMIA TYPE: Primary | ICD-10-CM

## 2023-10-13 DIAGNOSIS — F32.A ANXIETY AND DEPRESSION: ICD-10-CM

## 2023-10-13 DIAGNOSIS — Z86.69 HISTORY OF MIGRAINE: ICD-10-CM

## 2023-10-13 DIAGNOSIS — F41.9 ANXIETY AND DEPRESSION: ICD-10-CM

## 2023-10-13 ASSESSMENT — ENCOUNTER SYMPTOMS
RESPIRATORY NEGATIVE: 1
BACK PAIN: 1
ALLERGIC/IMMUNOLOGIC NEGATIVE: 1
GASTROINTESTINAL NEGATIVE: 1
EYES NEGATIVE: 1

## 2023-10-13 NOTE — PATIENT INSTRUCTIONS
Continue present treatment  Low-sodium low-fat low-carb diet  Regular exercises  Follow with the consultants  Lab work before the next visit  Return to clinic earlier if any problems

## 2023-11-27 ENCOUNTER — HOSPITAL ENCOUNTER (EMERGENCY)
Age: 38
Discharge: HOME | End: 2023-11-28
Attending: EMERGENCY MEDICINE
Payer: COMMERCIAL

## 2023-11-27 VITALS
TEMPERATURE: 98.6 F | BODY MASS INDEX: 30.31 KG/M2 | WEIGHT: 200 LBS | DIASTOLIC BLOOD PRESSURE: 95 MMHG | SYSTOLIC BLOOD PRESSURE: 134 MMHG | RESPIRATION RATE: 18 BRPM | HEART RATE: 75 BPM | HEIGHT: 68 IN | OXYGEN SATURATION: 99 %

## 2023-11-27 DIAGNOSIS — R10.84 GENERALIZED ABDOMINAL PAIN: Primary | ICD-10-CM

## 2023-11-27 LAB
HCG, URINE, POC: NEGATIVE
Lab: NORMAL
NEGATIVE QC PASS/FAIL: NORMAL
POSITIVE QC PASS/FAIL: NORMAL

## 2023-11-27 PROCEDURE — 81001 URINALYSIS AUTO W/SCOPE: CPT

## 2023-11-27 PROCEDURE — 99285 EMERGENCY DEPT VISIT HI MDM: CPT

## 2023-11-27 PROCEDURE — 83690 ASSAY OF LIPASE: CPT

## 2023-11-27 PROCEDURE — 85025 COMPLETE CBC W/AUTO DIFF WBC: CPT

## 2023-11-27 PROCEDURE — 83605 ASSAY OF LACTIC ACID: CPT

## 2023-11-27 PROCEDURE — 80053 COMPREHEN METABOLIC PANEL: CPT

## 2023-11-27 RX ORDER — KETOROLAC TROMETHAMINE 30 MG/ML
30 INJECTION, SOLUTION INTRAMUSCULAR; INTRAVENOUS ONCE
Status: DISCONTINUED | OUTPATIENT
Start: 2023-11-28 | End: 2023-11-28 | Stop reason: HOSPADM

## 2023-11-27 RX ORDER — 0.9 % SODIUM CHLORIDE 0.9 %
1000 INTRAVENOUS SOLUTION INTRAVENOUS ONCE
Status: COMPLETED | OUTPATIENT
Start: 2023-11-28 | End: 2023-11-28

## 2023-11-27 ASSESSMENT — PAIN DESCRIPTION - ORIENTATION: ORIENTATION: MID

## 2023-11-27 ASSESSMENT — PAIN DESCRIPTION - LOCATION: LOCATION: ABDOMEN

## 2023-11-27 ASSESSMENT — PAIN DESCRIPTION - DESCRIPTORS: DESCRIPTORS: ACHING;CRAMPING

## 2023-11-27 ASSESSMENT — PAIN - FUNCTIONAL ASSESSMENT: PAIN_FUNCTIONAL_ASSESSMENT: 0-10

## 2023-11-27 ASSESSMENT — PAIN SCALES - GENERAL: PAINLEVEL_OUTOF10: 4

## 2023-11-27 ASSESSMENT — PAIN DESCRIPTION - PAIN TYPE: TYPE: ACUTE PAIN

## 2023-11-28 ENCOUNTER — APPOINTMENT (OUTPATIENT)
Dept: CT IMAGING | Age: 38
End: 2023-11-28
Payer: COMMERCIAL

## 2023-11-28 LAB
ALBUMIN SERPL-MCNC: 4.3 G/DL (ref 3.5–5.2)
ALP SERPL-CCNC: 101 U/L (ref 35–104)
ALT SERPL-CCNC: 22 U/L (ref 0–32)
AMORPH SED URNS QL MICRO: PRESENT
ANION GAP SERPL CALCULATED.3IONS-SCNC: 11 MMOL/L (ref 7–16)
AST SERPL-CCNC: 18 U/L (ref 0–31)
BACTERIA URNS QL MICRO: ABNORMAL
BASOPHILS # BLD: 0.04 K/UL (ref 0–0.2)
BASOPHILS NFR BLD: 0 % (ref 0–2)
BILIRUB SERPL-MCNC: 0.7 MG/DL (ref 0–1.2)
BILIRUB UR QL STRIP: ABNORMAL
BUN SERPL-MCNC: 10 MG/DL (ref 6–20)
CALCIUM SERPL-MCNC: 9.1 MG/DL (ref 8.6–10.2)
CHLORIDE SERPL-SCNC: 102 MMOL/L (ref 98–107)
CLARITY UR: CLEAR
CO2 SERPL-SCNC: 24 MMOL/L (ref 22–29)
COLOR UR: YELLOW
CREAT SERPL-MCNC: 0.9 MG/DL (ref 0.5–1)
EOSINOPHIL # BLD: 0.09 K/UL (ref 0.05–0.5)
EOSINOPHILS RELATIVE PERCENT: 1 % (ref 0–6)
ERYTHROCYTE [DISTWIDTH] IN BLOOD BY AUTOMATED COUNT: 12.9 % (ref 11.5–15)
GFR SERPL CREATININE-BSD FRML MDRD: >60 ML/MIN/1.73M2
GLUCOSE SERPL-MCNC: 88 MG/DL (ref 74–99)
GLUCOSE UR STRIP-MCNC: NEGATIVE MG/DL
HCT VFR BLD AUTO: 39.2 % (ref 34–48)
HGB BLD-MCNC: 12.7 G/DL (ref 11.5–15.5)
HGB UR QL STRIP.AUTO: NEGATIVE
IMM GRANULOCYTES # BLD AUTO: 0.05 K/UL (ref 0–0.58)
IMM GRANULOCYTES NFR BLD: 1 % (ref 0–5)
KETONES UR STRIP-MCNC: 15 MG/DL
LACTATE BLDV-SCNC: 1.3 MMOL/L (ref 0.5–2.2)
LEUKOCYTE ESTERASE UR QL STRIP: NEGATIVE
LIPASE SERPL-CCNC: 17 U/L (ref 13–60)
LYMPHOCYTES NFR BLD: 2.62 K/UL (ref 1.5–4)
LYMPHOCYTES RELATIVE PERCENT: 24 % (ref 20–42)
MCH RBC QN AUTO: 28.9 PG (ref 26–35)
MCHC RBC AUTO-ENTMCNC: 32.4 G/DL (ref 32–34.5)
MCV RBC AUTO: 89.1 FL (ref 80–99.9)
MONOCYTES NFR BLD: 0.67 K/UL (ref 0.1–0.95)
MONOCYTES NFR BLD: 6 % (ref 2–12)
MUCOUS THREADS URNS QL MICRO: PRESENT
NEUTROPHILS NFR BLD: 69 % (ref 43–80)
NEUTS SEG NFR BLD: 7.63 K/UL (ref 1.8–7.3)
NITRITE UR QL STRIP: NEGATIVE
PH UR STRIP: 5.5 [PH] (ref 5–9)
PLATELET # BLD AUTO: 236 K/UL (ref 130–450)
PMV BLD AUTO: 8.9 FL (ref 7–12)
POTASSIUM SERPL-SCNC: 4 MMOL/L (ref 3.5–5)
PROT SERPL-MCNC: 7.3 G/DL (ref 6.4–8.3)
PROT UR STRIP-MCNC: NEGATIVE MG/DL
RBC # BLD AUTO: 4.4 M/UL (ref 3.5–5.5)
RBC #/AREA URNS HPF: ABNORMAL /HPF
SODIUM SERPL-SCNC: 137 MMOL/L (ref 132–146)
SP GR UR STRIP: >1.03 (ref 1–1.03)
UROBILINOGEN UR STRIP-ACNC: 0.2 EU/DL (ref 0–1)
WBC #/AREA URNS HPF: ABNORMAL /HPF
WBC OTHER # BLD: 11.1 K/UL (ref 4.5–11.5)

## 2023-11-28 PROCEDURE — 74177 CT ABD & PELVIS W/CONTRAST: CPT

## 2023-11-28 PROCEDURE — 6360000004 HC RX CONTRAST MEDICATION: Performed by: RADIOLOGY

## 2023-11-28 PROCEDURE — 2580000003 HC RX 258: Performed by: EMERGENCY MEDICINE

## 2023-11-28 RX ORDER — METOCLOPRAMIDE 10 MG/1
10 TABLET ORAL EVERY 6 HOURS PRN
Qty: 20 TABLET | Refills: 0 | Status: SHIPPED | OUTPATIENT
Start: 2023-11-28

## 2023-11-28 RX ORDER — POLYETHYLENE GLYCOL 3350, SODIUM CHLORIDE, POTASSIUM CHLORIDE, SODIUM BICARBONATE, AND SODIUM SULFATE 240; 5.84; 2.98; 6.72; 22.72 G/4L; G/4L; G/4L; G/4L; G/4L
4000 POWDER, FOR SOLUTION ORAL ONCE
Qty: 4000 ML | Refills: 0 | Status: SHIPPED | OUTPATIENT
Start: 2023-11-28 | End: 2023-11-28

## 2023-11-28 RX ADMIN — SODIUM CHLORIDE 1000 ML: 9 INJECTION, SOLUTION INTRAVENOUS at 00:13

## 2023-11-28 RX ADMIN — IOPAMIDOL 75 ML: 755 INJECTION, SOLUTION INTRAVENOUS at 01:02

## 2023-11-28 NOTE — ED NOTES
Department of Emergency Medicine  FIRST PROVIDER TRIAGE NOTE             [facility]           11/27/23  9:04 PM EST    Date of Encounter: 11/27/23   MRN: [id number]      HPI: Corina Gandhi is a 30 y.o. female who presents to the ED for Abdominal Pain (Abd pain, flank pain, nausea x 4 days) and Flank Pain (Abd pain, flank pain, nausea x 4 days)  She presents to the emergency department for bilateral flank pain which began about 4 days ago. Patient states the pain has been intermittent. Denies any urinary symptoms. Complains of nausea, but denies vomiting. States she has had some constipation. She took a fleets enema yesterday with results as well as using MiraLAX today and she shows her to have a bowel movement. ROS: Negative for cp or sob. PE: Gen Appearance/Constitutional: alert  CV: regular rate     Initial Plan of Care: All treatment areas with department are currently occupied. Plan to order/Initiate the following while awaiting opening in ED: labs.   Initiate Treatment-Testing, Proceed toTreatment Area When Bed Available for ED Attending/EDGARD to Continue Care    Electronically signed by HUDSON Josue CNP   DD: 11/27/23       HUDSON Josue CNP  11/27/23 9390

## 2023-11-28 NOTE — ED PROVIDER NOTES
home  Patient condition is stable    NOTE: This report was transcribed using voice recognition software.  Every effort was made to ensure accuracy; however, inadvertent computerized transcription errors may be present        Yayo Corley MD  11/28/23 4862

## 2023-11-29 ENCOUNTER — OFFICE VISIT (OUTPATIENT)
Dept: FAMILY MEDICINE CLINIC | Age: 38
End: 2023-11-29
Payer: COMMERCIAL

## 2023-11-29 VITALS
WEIGHT: 204.5 LBS | DIASTOLIC BLOOD PRESSURE: 66 MMHG | OXYGEN SATURATION: 98 % | HEIGHT: 68 IN | SYSTOLIC BLOOD PRESSURE: 108 MMHG | HEART RATE: 76 BPM | TEMPERATURE: 97.6 F | BODY MASS INDEX: 30.99 KG/M2

## 2023-11-29 DIAGNOSIS — R10.11 RIGHT UPPER QUADRANT ABDOMINAL PAIN: ICD-10-CM

## 2023-11-29 DIAGNOSIS — E78.5 HYPERLIPIDEMIA, UNSPECIFIED HYPERLIPIDEMIA TYPE: ICD-10-CM

## 2023-11-29 DIAGNOSIS — Z83.719 FAMILY HISTORY OF COLON POLYPS, UNSPECIFIED: Primary | ICD-10-CM

## 2023-11-29 DIAGNOSIS — G43.909 MIGRAINE WITHOUT STATUS MIGRAINOSUS, NOT INTRACTABLE, UNSPECIFIED MIGRAINE TYPE: ICD-10-CM

## 2023-11-29 PROCEDURE — G8482 FLU IMMUNIZE ORDER/ADMIN: HCPCS | Performed by: INTERNAL MEDICINE

## 2023-11-29 PROCEDURE — 99213 OFFICE O/P EST LOW 20 MIN: CPT | Performed by: INTERNAL MEDICINE

## 2023-11-29 PROCEDURE — 1036F TOBACCO NON-USER: CPT | Performed by: INTERNAL MEDICINE

## 2023-11-29 PROCEDURE — G8427 DOCREV CUR MEDS BY ELIG CLIN: HCPCS | Performed by: INTERNAL MEDICINE

## 2023-11-29 PROCEDURE — G8417 CALC BMI ABV UP PARAM F/U: HCPCS | Performed by: INTERNAL MEDICINE

## 2023-11-29 ASSESSMENT — ENCOUNTER SYMPTOMS
BLOOD IN STOOL: 0
SORE THROAT: 0
EYE PAIN: 0
SHORTNESS OF BREATH: 0
EYE DISCHARGE: 0
ABDOMINAL PAIN: 0
NAUSEA: 0
SINUS PAIN: 0

## 2023-12-08 ENCOUNTER — TELEPHONE (OUTPATIENT)
Dept: FAMILY MEDICINE CLINIC | Age: 38
End: 2023-12-08

## 2023-12-08 NOTE — TELEPHONE ENCOUNTER
Patient called stating she tested Positive on Tuesday 12.5. 21.  just informing Dr Ravinder Rebollar  Last seen 11/29/2023  Next appt 1/15/2024

## 2023-12-28 RX ORDER — ATORVASTATIN CALCIUM 40 MG/1
40 TABLET, FILM COATED ORAL NIGHTLY
Qty: 90 TABLET | Refills: 0 | Status: SHIPPED | OUTPATIENT
Start: 2023-12-28

## 2024-04-03 ENCOUNTER — OFFICE VISIT (OUTPATIENT)
Dept: FAMILY MEDICINE CLINIC | Age: 39
End: 2024-04-03
Payer: COMMERCIAL

## 2024-04-03 VITALS
BODY MASS INDEX: 30.99 KG/M2 | TEMPERATURE: 98.1 F | DIASTOLIC BLOOD PRESSURE: 82 MMHG | HEART RATE: 85 BPM | WEIGHT: 204.5 LBS | OXYGEN SATURATION: 99 % | SYSTOLIC BLOOD PRESSURE: 134 MMHG | HEIGHT: 68 IN

## 2024-04-03 DIAGNOSIS — Z83.719 FAMILY HISTORY OF COLON POLYPS, UNSPECIFIED: ICD-10-CM

## 2024-04-03 DIAGNOSIS — E78.5 HYPERLIPIDEMIA, UNSPECIFIED HYPERLIPIDEMIA TYPE: Primary | ICD-10-CM

## 2024-04-03 DIAGNOSIS — G43.909 MIGRAINE WITHOUT STATUS MIGRAINOSUS, NOT INTRACTABLE, UNSPECIFIED MIGRAINE TYPE: ICD-10-CM

## 2024-04-03 PROCEDURE — G8417 CALC BMI ABV UP PARAM F/U: HCPCS | Performed by: INTERNAL MEDICINE

## 2024-04-03 PROCEDURE — 1036F TOBACCO NON-USER: CPT | Performed by: INTERNAL MEDICINE

## 2024-04-03 PROCEDURE — G8427 DOCREV CUR MEDS BY ELIG CLIN: HCPCS | Performed by: INTERNAL MEDICINE

## 2024-04-03 PROCEDURE — 99214 OFFICE O/P EST MOD 30 MIN: CPT | Performed by: INTERNAL MEDICINE

## 2024-04-03 RX ORDER — ATORVASTATIN CALCIUM 40 MG/1
40 TABLET, FILM COATED ORAL NIGHTLY
Qty: 90 TABLET | Refills: 0 | Status: SHIPPED | OUTPATIENT
Start: 2024-04-03

## 2024-04-03 RX ORDER — DESIPRAMINE HYDROCHLORIDE 50 MG/1
50 TABLET ORAL NIGHTLY
COMMUNITY
Start: 2024-03-29

## 2024-04-03 ASSESSMENT — PATIENT HEALTH QUESTIONNAIRE - PHQ9
7. TROUBLE CONCENTRATING ON THINGS, SUCH AS READING THE NEWSPAPER OR WATCHING TELEVISION: NOT AT ALL
SUM OF ALL RESPONSES TO PHQ QUESTIONS 1-9: 0
2. FEELING DOWN, DEPRESSED OR HOPELESS: NOT AT ALL
9. THOUGHTS THAT YOU WOULD BE BETTER OFF DEAD, OR OF HURTING YOURSELF: NOT AT ALL
SUM OF ALL RESPONSES TO PHQ QUESTIONS 1-9: 0
4. FEELING TIRED OR HAVING LITTLE ENERGY: NOT AT ALL
SUM OF ALL RESPONSES TO PHQ QUESTIONS 1-9: 0
5. POOR APPETITE OR OVEREATING: NOT AT ALL
6. FEELING BAD ABOUT YOURSELF - OR THAT YOU ARE A FAILURE OR HAVE LET YOURSELF OR YOUR FAMILY DOWN: NOT AT ALL
SUM OF ALL RESPONSES TO PHQ9 QUESTIONS 1 & 2: 0
3. TROUBLE FALLING OR STAYING ASLEEP: NOT AT ALL
10. IF YOU CHECKED OFF ANY PROBLEMS, HOW DIFFICULT HAVE THESE PROBLEMS MADE IT FOR YOU TO DO YOUR WORK, TAKE CARE OF THINGS AT HOME, OR GET ALONG WITH OTHER PEOPLE: NOT DIFFICULT AT ALL
1. LITTLE INTEREST OR PLEASURE IN DOING THINGS: NOT AT ALL
SUM OF ALL RESPONSES TO PHQ QUESTIONS 1-9: 0
8. MOVING OR SPEAKING SO SLOWLY THAT OTHER PEOPLE COULD HAVE NOTICED. OR THE OPPOSITE, BEING SO FIGETY OR RESTLESS THAT YOU HAVE BEEN MOVING AROUND A LOT MORE THAN USUAL: NOT AT ALL

## 2024-04-03 ASSESSMENT — ENCOUNTER SYMPTOMS
NAUSEA: 0
SHORTNESS OF BREATH: 0
ABDOMINAL PAIN: 0
BLOOD IN STOOL: 0
SORE THROAT: 0
SINUS PAIN: 0
EYE PAIN: 0

## 2024-04-03 NOTE — PROGRESS NOTES
Future  -     TSH; Future  2. Migraine without status migrainosus, not intractable, unspecified migraine type  -     CBC with Auto Differential; Future  -     Urinalysis with Microscopic; Future  3. Family history of colon polyps, unspecified         PLAN:       Colonoscopy - Dr Yu- reviewed  Rpt 5 yrs    Pt is stable on current medical treatment.   Continue current treatment plan    Side effects/Adverse effects/Precautions are reviewed with patient.     Low salt, Low Carb diet an low fat diet  Continue medications as advised and take them regularly  Regular exercises as advised    Discussed natural and expected course of this diagnosis and need to alert me if symptoms do not follow expected course, or if any worse.    Smoking cessation if applicable, discussed with patient.       There are no Patient Instructions on file for this visit.    Return in about 2 months (around 6/17/2024).

## 2024-09-06 ENCOUNTER — TELEPHONE (OUTPATIENT)
Dept: FAMILY MEDICINE CLINIC | Age: 39
End: 2024-09-06

## 2024-09-26 ENCOUNTER — HOSPITAL ENCOUNTER (EMERGENCY)
Age: 39
Discharge: HOME | End: 2024-09-26
Attending: EMERGENCY MEDICINE
Payer: COMMERCIAL

## 2024-09-26 VITALS
RESPIRATION RATE: 16 BRPM | DIASTOLIC BLOOD PRESSURE: 79 MMHG | TEMPERATURE: 98.1 F | SYSTOLIC BLOOD PRESSURE: 142 MMHG | HEART RATE: 75 BPM | OXYGEN SATURATION: 100 %

## 2024-09-26 DIAGNOSIS — T50.901A ACCIDENTAL OVERDOSE, INITIAL ENCOUNTER: Primary | ICD-10-CM

## 2024-09-26 LAB
AMPHET UR QL SCN: NEGATIVE
ANION GAP SERPL CALCULATED.3IONS-SCNC: 12 MMOL/L (ref 7–16)
BARBITURATES UR QL SCN: NEGATIVE
BASOPHILS # BLD: 0.02 K/UL (ref 0–0.2)
BASOPHILS NFR BLD: 0 % (ref 0–2)
BENZODIAZ UR QL: NEGATIVE
BUN SERPL-MCNC: 12 MG/DL (ref 6–20)
BUPRENORPHINE UR QL: NEGATIVE
CALCIUM SERPL-MCNC: 9.4 MG/DL (ref 8.6–10.2)
CANNABINOIDS UR QL SCN: NEGATIVE
CHLORIDE SERPL-SCNC: 102 MMOL/L (ref 98–107)
CO2 SERPL-SCNC: 22 MMOL/L (ref 22–29)
COCAINE UR QL SCN: NEGATIVE
CREAT SERPL-MCNC: 1 MG/DL (ref 0.5–1)
EOSINOPHIL # BLD: 0.01 K/UL (ref 0.05–0.5)
EOSINOPHILS RELATIVE PERCENT: 0 % (ref 0–6)
ERYTHROCYTE [DISTWIDTH] IN BLOOD BY AUTOMATED COUNT: 13 % (ref 11.5–15)
FENTANYL UR QL: NEGATIVE
GFR, ESTIMATED: 78 ML/MIN/1.73M2
GLUCOSE SERPL-MCNC: 101 MG/DL (ref 74–99)
HCG, URINE, POC: NEGATIVE
HCT VFR BLD AUTO: 42.3 % (ref 34–48)
HGB BLD-MCNC: 13.7 G/DL (ref 11.5–15.5)
IMM GRANULOCYTES # BLD AUTO: 0.03 K/UL (ref 0–0.58)
IMM GRANULOCYTES NFR BLD: 0 % (ref 0–5)
LYMPHOCYTES NFR BLD: 2.14 K/UL (ref 1.5–4)
LYMPHOCYTES RELATIVE PERCENT: 32 % (ref 20–42)
Lab: NORMAL
MCH RBC QN AUTO: 28.7 PG (ref 26–35)
MCHC RBC AUTO-ENTMCNC: 32.4 G/DL (ref 32–34.5)
MCV RBC AUTO: 88.5 FL (ref 80–99.9)
METHADONE UR QL: NEGATIVE
MONOCYTES NFR BLD: 0.48 K/UL (ref 0.1–0.95)
MONOCYTES NFR BLD: 7 % (ref 2–12)
NEGATIVE QC PASS/FAIL: NORMAL
NEUTROPHILS NFR BLD: 60 % (ref 43–80)
NEUTS SEG NFR BLD: 4.03 K/UL (ref 1.8–7.3)
OPIATES UR QL SCN: NEGATIVE
OXYCODONE UR QL SCN: NEGATIVE
PCP UR QL SCN: NEGATIVE
PLATELET # BLD AUTO: 302 K/UL (ref 130–450)
PMV BLD AUTO: 9.3 FL (ref 7–12)
POSITIVE QC PASS/FAIL: NORMAL
POTASSIUM SERPL-SCNC: 4.2 MMOL/L (ref 3.5–5)
RBC # BLD AUTO: 4.78 M/UL (ref 3.5–5.5)
SODIUM SERPL-SCNC: 136 MMOL/L (ref 132–146)
TEST INFORMATION: NORMAL
WBC OTHER # BLD: 6.7 K/UL (ref 4.5–11.5)

## 2024-09-26 PROCEDURE — 99284 EMERGENCY DEPT VISIT MOD MDM: CPT

## 2024-09-26 PROCEDURE — 80048 BASIC METABOLIC PNL TOTAL CA: CPT

## 2024-09-26 PROCEDURE — 2580000003 HC RX 258: Performed by: EMERGENCY MEDICINE

## 2024-09-26 PROCEDURE — 36415 COLL VENOUS BLD VENIPUNCTURE: CPT

## 2024-09-26 PROCEDURE — 80307 DRUG TEST PRSMV CHEM ANLYZR: CPT

## 2024-09-26 PROCEDURE — 93005 ELECTROCARDIOGRAM TRACING: CPT | Performed by: EMERGENCY MEDICINE

## 2024-09-26 PROCEDURE — 85025 COMPLETE CBC W/AUTO DIFF WBC: CPT

## 2024-09-26 RX ORDER — 0.9 % SODIUM CHLORIDE 0.9 %
1000 INTRAVENOUS SOLUTION INTRAVENOUS ONCE
Status: COMPLETED | OUTPATIENT
Start: 2024-09-26 | End: 2024-09-26

## 2024-09-26 RX ADMIN — SODIUM CHLORIDE 1000 ML: 9 INJECTION, SOLUTION INTRAVENOUS at 11:27

## 2024-09-26 ASSESSMENT — ENCOUNTER SYMPTOMS
NAUSEA: 1
COUGH: 0
DIARRHEA: 0
SHORTNESS OF BREATH: 0
VOMITING: 0
SORE THROAT: 0
WHEEZING: 0
CHEST TIGHTNESS: 0
SINUS PRESSURE: 0
ABDOMINAL PAIN: 0
EYE PAIN: 0
RHINORRHEA: 0
BACK PAIN: 0

## 2024-09-26 ASSESSMENT — LIFESTYLE VARIABLES
HOW OFTEN DO YOU HAVE A DRINK CONTAINING ALCOHOL: NEVER
HOW MANY STANDARD DRINKS CONTAINING ALCOHOL DO YOU HAVE ON A TYPICAL DAY: PATIENT DOES NOT DRINK

## 2024-09-27 LAB
EKG ATRIAL RATE: 78 BPM
EKG P AXIS: 32 DEGREES
EKG P-R INTERVAL: 122 MS
EKG Q-T INTERVAL: 380 MS
EKG QRS DURATION: 86 MS
EKG QTC CALCULATION (BAZETT): 433 MS
EKG R AXIS: 38 DEGREES
EKG T AXIS: 28 DEGREES
EKG VENTRICULAR RATE: 78 BPM

## 2024-09-27 PROCEDURE — 93010 ELECTROCARDIOGRAM REPORT: CPT | Performed by: INTERNAL MEDICINE

## 2024-10-31 NOTE — PATIENT INSTRUCTIONS
Colonoscopy 10/10/2024:BBPS 6 (good prep), normal terminal ileum, normal entire colon, perianal and digital rectal exams normal. No specimens collected. Colonoscopy for screening recommended in 10 years. CBC 9/6/2024:WBC 5.1, hemoglobin 13.6, .5, platelet 309. Electrodiagnotic Laboratory  Accredited by the [facility] with Exemplary status  BERNABE Gutierres D.O. [facility]  [address] [place]  Phone: [phone]  Fax: [phone]        Today you had an electrodiagnostic exam which included nerve conduction studies (NCS) and electromyography (EMG). This test evaluated the electrical activity of your nerves and muscles to help determine if you have a nerve or muscle disease. This test can help determine the location and type of a nerve or muscle problem. This will help your referring doctor diagnose your condition and determine the appropriate next step in your treatment plan. After your test:    1. There are no long lasting side effects of the test.     2. You may resume your normal activities without restrictions. 3.  Resume any medications that were stopped for the test.     4  If you have sore areas or bruising in your muscles where the needle was placed, apply a cold pack to the sore area for 15-20 minutes three to four times a day as needed for pain. The soreness should go away in about 1-2 days. 5. Your results were provided  Briefly at the end of your test and the final detailed report will be provided to your referring physician, and/or primary care physician and any other parties you requested within 1-2 days of the examination. You may wish to contact your referring provider after a few days to determine what they would like you to do next. 6.  Please call [phone] with any questions or concerns and if you develop increased body temperature/fever, swelling, tenderness, increased pain and/or drainage from the sites where the needle was placed. Thank you for choosing us for your health care needs.

## 2025-01-04 DIAGNOSIS — E78.5 HYPERLIPIDEMIA, UNSPECIFIED HYPERLIPIDEMIA TYPE: ICD-10-CM

## 2025-01-06 NOTE — TELEPHONE ENCOUNTER
Name of Medication(s) Requested:  Requested Prescriptions     Pending Prescriptions Disp Refills    atorvastatin (LIPITOR) 40 MG tablet 90 tablet 0     Sig: Take 1 tablet by mouth nightly       Medication is on current medication list Yes    Dosage and directions were verified? Yes    Quantity verified: 90 day supply     Pharmacy Verified?  Yes    Last Appointment:  4/3/2024    Future appts:  Future Appointments   Date Time Provider Department Center   1/17/2025  1:00 PM Gerry Norton MD [facility] DEP        (If no appt send self scheduling link. .REFILLAPPT)  Scheduling request sent?     [] Yes  [x] No    Does patient need updated?  [] Yes  [x] No

## 2025-01-07 ENCOUNTER — TELEPHONE (OUTPATIENT)
Dept: FAMILY MEDICINE CLINIC | Age: 40
End: 2025-01-07

## 2025-01-07 NOTE — TELEPHONE ENCOUNTER
Called patient regarding her upcoming appointment she scheduled online with Dr. JHON Norton voicemail not sat up unable to leave message. My chart message sent

## 2025-01-08 RX ORDER — ATORVASTATIN CALCIUM 40 MG/1
40 TABLET, FILM COATED ORAL NIGHTLY
Qty: 30 TABLET | Refills: 0 | Status: SHIPPED | OUTPATIENT
Start: 2025-01-08

## 2025-01-08 NOTE — TELEPHONE ENCOUNTER
Called patient back  she would prefer to see Dr. Norton advised I would have to let Dr. CAROLYN Norton know and see if he would take her as a patient. Pt stated she has seen him several times and has an appointment on 01/17/2025 to follow up with him.

## 2025-01-08 NOTE — TELEPHONE ENCOUNTER
Gerry Norton MD Simmons, Angela, MA  Caller: Unspecified (Yesterday, 12:52 PM)  Phone Number: [phone]     Patient should follow-up with Dr. Camejo            Called patient and informed Dr. CAROLYN Norton is not taking new patients at this time. Pt verbalized understanding and asked if she could schedule with Dr. Camejo. Appt scheduled and patient informed ludwin Grewal would like her to have her Labs done prior to her appt patient agreed orders are in the system

## 2025-01-08 NOTE — TELEPHONE ENCOUNTER
Patient scheduled an appointment for 01/21/2025 advised to have overdue labs done prior to appt. Pt verbalized understanding and agreed    Last seen 4/3/2024  Next appt 01/21/2025

## 2025-01-15 DIAGNOSIS — E78.5 HYPERLIPIDEMIA, UNSPECIFIED HYPERLIPIDEMIA TYPE: ICD-10-CM

## 2025-01-15 DIAGNOSIS — G43.909 MIGRAINE WITHOUT STATUS MIGRAINOSUS, NOT INTRACTABLE, UNSPECIFIED MIGRAINE TYPE: ICD-10-CM

## 2025-01-15 LAB
BASOPHILS ABSOLUTE: 0.02 K/UL (ref 0–0.2)
BASOPHILS RELATIVE PERCENT: 0 % (ref 0–2)
EOSINOPHILS ABSOLUTE: 0 K/UL (ref 0.05–0.5)
EOSINOPHILS RELATIVE PERCENT: 0 % (ref 0–6)
HCT VFR BLD CALC: 41.9 % (ref 34–48)
HEMOGLOBIN: 13.4 G/DL (ref 11.5–15.5)
IMMATURE GRANULOCYTES %: 0 % (ref 0–5)
IMMATURE GRANULOCYTES ABSOLUTE: <0.03 K/UL (ref 0–0.58)
LYMPHOCYTES ABSOLUTE: 1.9 K/UL (ref 1.5–4)
LYMPHOCYTES RELATIVE PERCENT: 37 % (ref 20–42)
MCH RBC QN AUTO: 29.2 PG (ref 26–35)
MCHC RBC AUTO-ENTMCNC: 32 G/DL (ref 32–34.5)
MCV RBC AUTO: 91.3 FL (ref 80–99.9)
MONOCYTES ABSOLUTE: 0.42 K/UL (ref 0.1–0.95)
MONOCYTES RELATIVE PERCENT: 8 % (ref 2–12)
NEUTROPHILS ABSOLUTE: 2.78 K/UL (ref 1.8–7.3)
NEUTROPHILS RELATIVE PERCENT: 54 % (ref 43–80)
PDW BLD-RTO: 13 % (ref 11.5–15)
PLATELET # BLD: 276 K/UL (ref 130–450)
PMV BLD AUTO: 9.8 FL (ref 7–12)
RBC # BLD: 4.59 M/UL (ref 3.5–5.5)
WBC # BLD: 5.1 K/UL (ref 4.5–11.5)

## 2025-01-16 LAB
ALBUMIN: 4.2 G/DL (ref 3.5–5.2)
ALP BLD-CCNC: 88 U/L (ref 35–104)
ALT SERPL-CCNC: 11 U/L (ref 0–32)
AMORPHOUS: PRESENT
ANION GAP SERPL CALCULATED.3IONS-SCNC: 9 MMOL/L (ref 7–16)
AST SERPL-CCNC: 12 U/L (ref 0–31)
BACTERIA: ABNORMAL
BILIRUB SERPL-MCNC: 0.7 MG/DL (ref 0–1.2)
BILIRUBIN, URINE: NEGATIVE
BUN BLDV-MCNC: 15 MG/DL (ref 6–20)
CALCIUM SERPL-MCNC: 9.5 MG/DL (ref 8.6–10.2)
CHLORIDE BLD-SCNC: 105 MMOL/L (ref 98–107)
CHOLESTEROL, FASTING: 193 MG/DL
CO2: 25 MMOL/L (ref 22–29)
COLOR, UA: YELLOW
CREAT SERPL-MCNC: 0.9 MG/DL (ref 0.5–1)
GFR, ESTIMATED: 84 ML/MIN/1.73M2
GLUCOSE FASTING: 96 MG/DL (ref 74–99)
GLUCOSE URINE: NEGATIVE MG/DL
HDLC SERPL-MCNC: 40 MG/DL
KETONES, URINE: NEGATIVE MG/DL
LDL CHOLESTEROL: 131 MG/DL
LEUKOCYTE ESTERASE, URINE: NEGATIVE
NITRITE, URINE: NEGATIVE
PH, URINE: 5.5 (ref 5–9)
POTASSIUM SERPL-SCNC: 4.6 MMOL/L (ref 3.5–5)
PROTEIN UA: NEGATIVE MG/DL
RBC UA: ABNORMAL /HPF
SODIUM BLD-SCNC: 139 MMOL/L (ref 132–146)
SPECIFIC GRAVITY UA: >1.03 (ref 1–1.03)
TOTAL PROTEIN: 7.2 G/DL (ref 6.4–8.3)
TRIGLYCERIDE, FASTING: 110 MG/DL
TSH SERPL DL<=0.05 MIU/L-ACNC: 0.92 UIU/ML (ref 0.27–4.2)
TURBIDITY: ABNORMAL
URINE HGB: NEGATIVE
UROBILINOGEN, URINE: 0.2 EU/DL (ref 0–1)
VLDLC SERPL CALC-MCNC: 22 MG/DL
WBC UA: ABNORMAL /HPF

## 2025-01-21 ENCOUNTER — OFFICE VISIT (OUTPATIENT)
Dept: FAMILY MEDICINE CLINIC | Age: 40
End: 2025-01-21
Payer: COMMERCIAL

## 2025-01-21 VITALS
HEIGHT: 68 IN | OXYGEN SATURATION: 99 % | HEART RATE: 98 BPM | DIASTOLIC BLOOD PRESSURE: 76 MMHG | BODY MASS INDEX: 28.39 KG/M2 | WEIGHT: 187.3 LBS | SYSTOLIC BLOOD PRESSURE: 124 MMHG | TEMPERATURE: 98.2 F

## 2025-01-21 DIAGNOSIS — E78.5 HYPERLIPIDEMIA, UNSPECIFIED HYPERLIPIDEMIA TYPE: Primary | ICD-10-CM

## 2025-01-21 DIAGNOSIS — Z83.719 FAMILY HISTORY OF COLON POLYPS, UNSPECIFIED: ICD-10-CM

## 2025-01-21 DIAGNOSIS — G43.909 MIGRAINE WITHOUT STATUS MIGRAINOSUS, NOT INTRACTABLE, UNSPECIFIED MIGRAINE TYPE: ICD-10-CM

## 2025-01-21 PROCEDURE — G8427 DOCREV CUR MEDS BY ELIG CLIN: HCPCS | Performed by: INTERNAL MEDICINE

## 2025-01-21 PROCEDURE — G8417 CALC BMI ABV UP PARAM F/U: HCPCS | Performed by: INTERNAL MEDICINE

## 2025-01-21 PROCEDURE — 1036F TOBACCO NON-USER: CPT | Performed by: INTERNAL MEDICINE

## 2025-01-21 PROCEDURE — 99214 OFFICE O/P EST MOD 30 MIN: CPT | Performed by: INTERNAL MEDICINE

## 2025-01-21 SDOH — ECONOMIC STABILITY: FOOD INSECURITY: WITHIN THE PAST 12 MONTHS, YOU WORRIED THAT YOUR FOOD WOULD RUN OUT BEFORE YOU GOT MONEY TO BUY MORE.: NEVER TRUE

## 2025-01-21 SDOH — ECONOMIC STABILITY: FOOD INSECURITY: WITHIN THE PAST 12 MONTHS, THE FOOD YOU BOUGHT JUST DIDN'T LAST AND YOU DIDN'T HAVE MONEY TO GET MORE.: NEVER TRUE

## 2025-01-21 ASSESSMENT — ENCOUNTER SYMPTOMS
ABDOMINAL PAIN: 0
EYE DISCHARGE: 0
SHORTNESS OF BREATH: 0
NAUSEA: 0
EYE PAIN: 0
SORE THROAT: 0
BLOOD IN STOOL: 0
SINUS PAIN: 0

## 2025-01-21 ASSESSMENT — PATIENT HEALTH QUESTIONNAIRE - PHQ9
SUM OF ALL RESPONSES TO PHQ9 QUESTIONS 1 & 2: 0
10. IF YOU CHECKED OFF ANY PROBLEMS, HOW DIFFICULT HAVE THESE PROBLEMS MADE IT FOR YOU TO DO YOUR WORK, TAKE CARE OF THINGS AT HOME, OR GET ALONG WITH OTHER PEOPLE: NOT DIFFICULT AT ALL
2. FEELING DOWN, DEPRESSED OR HOPELESS: NOT AT ALL
4. FEELING TIRED OR HAVING LITTLE ENERGY: NOT AT ALL
5. POOR APPETITE OR OVEREATING: NOT AT ALL
SUM OF ALL RESPONSES TO PHQ QUESTIONS 1-9: 0
SUM OF ALL RESPONSES TO PHQ QUESTIONS 1-9: 0
6. FEELING BAD ABOUT YOURSELF - OR THAT YOU ARE A FAILURE OR HAVE LET YOURSELF OR YOUR FAMILY DOWN: NOT AT ALL
1. LITTLE INTEREST OR PLEASURE IN DOING THINGS: NOT AT ALL
3. TROUBLE FALLING OR STAYING ASLEEP: NOT AT ALL
SUM OF ALL RESPONSES TO PHQ QUESTIONS 1-9: 0
7. TROUBLE CONCENTRATING ON THINGS, SUCH AS READING THE NEWSPAPER OR WATCHING TELEVISION: NOT AT ALL
9. THOUGHTS THAT YOU WOULD BE BETTER OFF DEAD, OR OF HURTING YOURSELF: NOT AT ALL
SUM OF ALL RESPONSES TO PHQ QUESTIONS 1-9: 0
8. MOVING OR SPEAKING SO SLOWLY THAT OTHER PEOPLE COULD HAVE NOTICED. OR THE OPPOSITE, BEING SO FIGETY OR RESTLESS THAT YOU HAVE BEEN MOVING AROUND A LOT MORE THAN USUAL: NOT AT ALL

## 2025-01-21 NOTE — PROGRESS NOTES
Chief Complaint   Patient presents with    Hyperlipidemia     Pt here for follow up on hyperlipidemia, pt reports feeling ok.     Discuss Labs     Completed on 01/15/2025     Health Maintenance     Flu vaccine - pt declined, Pap- due - pt reminded        HPI:  Patient is here for follow-up     Pt is for f/u    Feeling okay    Last visit April last year    Could not come last two visit( No show)    Following with Neuro at [facility] for migraine, which are controlled with current treatment   Ob Gyn - Dr Preciado - Last visit 9/24      Allergy and Medications are reviewed and updated.  Past Medical History, Surgical History, and Family History has been reviewed and updated.    Review of Systems:  Review of Systems   Constitutional:  Negative for chills and fever.   HENT:  Negative for congestion, sinus pain and sore throat.    Eyes:  Negative for pain and discharge.   Respiratory:  Negative for shortness of breath (No new SOb).    Cardiovascular:  Negative for chest pain.   Gastrointestinal:  Negative for abdominal pain, blood in stool and nausea.   Genitourinary:  Negative for flank pain and frequency.   Musculoskeletal:  Negative for neck pain.   Hematological:  Does not bruise/bleed easily.   Psychiatric/Behavioral:  Negative for suicidal ideas.          Vitals:    01/21/25 1055   BP: 124/76   Position: Sitting   Pulse: 98   Temp: 98.2 °F (36.8 °C)   TempSrc: Temporal   SpO2: 99%   Weight: 85 kg (187 lb 4.8 oz)   Height: 1.727 m (5' 8\")       Physical Exam  Vitals reviewed.   Constitutional:       Appearance: She is well-developed.   HENT:      Head: Normocephalic and atraumatic.      Nose: Nose normal.   Eyes:      Conjunctiva/sclera: Conjunctivae normal.      Pupils: Pupils are equal, round, and reactive to light.   Neck:      Vascular: No JVD.   Cardiovascular:      Rate and Rhythm: Normal rate and regular rhythm.   Pulmonary:      Effort: Pulmonary effort is normal.      Breath sounds: Normal breath sounds.   Abdominal:

## 2025-02-07 DIAGNOSIS — E78.5 HYPERLIPIDEMIA, UNSPECIFIED HYPERLIPIDEMIA TYPE: ICD-10-CM

## 2025-02-07 RX ORDER — ATORVASTATIN CALCIUM 40 MG/1
40 TABLET, FILM COATED ORAL NIGHTLY
Qty: 30 TABLET | Refills: 2 | Status: SHIPPED | OUTPATIENT
Start: 2025-02-07

## 2025-02-07 NOTE — TELEPHONE ENCOUNTER
Name of Medication(s) Requested:  Requested Prescriptions     Pending Prescriptions Disp Refills    atorvastatin (LIPITOR) 40 MG tablet 30 tablet 2     Sig: Take 1 tablet by mouth nightly       Medication is on current medication list Yes    Dosage and directions were verified? Yes    Quantity verified: 30 day supply     Pharmacy Verified?  Yes    Last Appointment:  1/21/2025    Future appts:  Future Appointments   Date Time Provider Department Center   4/28/2025  2:15 PM Zeina Camejo MD [facility] DE[facility]        (If no appt send self scheduling link. .REFILLAPPT)  Scheduling request sent?     [] Yes  [x] No    Does patient need updated?  [] Yes  [x] No

## 2025-03-07 ENCOUNTER — PATIENT MESSAGE (OUTPATIENT)
Dept: FAMILY MEDICINE CLINIC | Age: 40
End: 2025-03-07

## 2025-03-17 ENCOUNTER — PATIENT MESSAGE (OUTPATIENT)
Dept: FAMILY MEDICINE CLINIC | Age: 40
End: 2025-03-17

## 2025-03-31 ENCOUNTER — OFFICE VISIT (OUTPATIENT)
Dept: FAMILY MEDICINE CLINIC | Age: 40
End: 2025-03-31
Payer: COMMERCIAL

## 2025-03-31 VITALS
OXYGEN SATURATION: 99 % | HEIGHT: 68 IN | HEART RATE: 92 BPM | DIASTOLIC BLOOD PRESSURE: 80 MMHG | BODY MASS INDEX: 29.37 KG/M2 | TEMPERATURE: 98.5 F | SYSTOLIC BLOOD PRESSURE: 132 MMHG | WEIGHT: 193.8 LBS

## 2025-03-31 DIAGNOSIS — J32.2 CHRONIC ETHMOIDAL SINUSITIS: Primary | ICD-10-CM

## 2025-03-31 DIAGNOSIS — E78.5 HYPERLIPIDEMIA, UNSPECIFIED HYPERLIPIDEMIA TYPE: ICD-10-CM

## 2025-03-31 DIAGNOSIS — G43.909 MIGRAINE WITHOUT STATUS MIGRAINOSUS, NOT INTRACTABLE, UNSPECIFIED MIGRAINE TYPE: ICD-10-CM

## 2025-03-31 PROCEDURE — G8427 DOCREV CUR MEDS BY ELIG CLIN: HCPCS | Performed by: INTERNAL MEDICINE

## 2025-03-31 PROCEDURE — 1036F TOBACCO NON-USER: CPT | Performed by: INTERNAL MEDICINE

## 2025-03-31 PROCEDURE — G8417 CALC BMI ABV UP PARAM F/U: HCPCS | Performed by: INTERNAL MEDICINE

## 2025-03-31 PROCEDURE — 99214 OFFICE O/P EST MOD 30 MIN: CPT | Performed by: INTERNAL MEDICINE

## 2025-03-31 RX ORDER — FREMANEZUMAB-VFRM 225 MG/1.5ML
1 INJECTION SUBCUTANEOUS
COMMUNITY

## 2025-03-31 RX ORDER — CETIRIZINE HYDROCHLORIDE 10 MG/1
10 TABLET ORAL DAILY
COMMUNITY

## 2025-03-31 ASSESSMENT — ENCOUNTER SYMPTOMS
BLOOD IN STOOL: 0
ABDOMINAL PAIN: 0
SORE THROAT: 0
SHORTNESS OF BREATH: 0
EYE DISCHARGE: 0
SINUS PAIN: 1
EYE PAIN: 0
SINUS PRESSURE: 1
NAUSEA: 0

## 2025-03-31 NOTE — PROGRESS NOTES
Chief Complaint   Patient presents with    Ear Fullness     C/o left ear fullness,pain and popping, sinus drainage, chronic migraines increasing lately        Referral - General     Referral ENT - Dr. Abel        HPI:  Patient is here for follow-up     Above noted    Has ch sinus symptoms, on left, getting more - since feb     Had tried Azelastine in past - not helping    Currently on Zyrtec and Flonase    Has seen Dr Abel in past        Allergy and Medications are reviewed and updated.  Past Medical History, Surgical History, and Family History has been reviewed and updated.    Review of Systems:  Review of Systems   Constitutional:  Negative for chills and fever.   HENT:  Positive for postnasal drip, sinus pressure (chronic) and sinus pain (Chronic on left). Negative for congestion and sore throat.    Eyes:  Negative for pain and discharge.   Respiratory:  Negative for shortness of breath (No new SOb).    Cardiovascular:  Negative for chest pain.   Gastrointestinal:  Negative for abdominal pain, blood in stool and nausea.   Genitourinary:  Negative for flank pain and frequency.   Musculoskeletal:  Negative for neck pain.   Hematological:  Does not bruise/bleed easily.   Psychiatric/Behavioral:  Negative for suicidal ideas.          Vitals:    03/31/25 1010   BP: 132/80   BP Site: Left Upper Arm   Patient Position: Sitting   Pulse: 92   Temp: 98.5 °F (36.9 °C)   TempSrc: Temporal   SpO2: 99%   Weight: 87.9 kg (193 lb 12.8 oz)   Height: 1.727 m (5' 8\")       Physical Exam  Vitals reviewed.   Constitutional:       Appearance: She is well-developed.   HENT:      Head: Normocephalic and atraumatic.      Nose: Nose normal.   Eyes:      Conjunctiva/sclera: Conjunctivae normal.      Pupils: Pupils are equal, round, and reactive to light.   Neck:      Vascular: No JVD.   Cardiovascular:      Rate and Rhythm: Normal rate and regular rhythm.   Pulmonary:      Effort: Pulmonary effort is normal.      Breath sounds:

## 2025-04-21 ENCOUNTER — TELEPHONE (OUTPATIENT)
Dept: ENT CLINIC | Age: 40
End: 2025-04-21

## 2025-04-22 NOTE — TELEPHONE ENCOUNTER
Spoke to patient and ask what was the concern from abdomen CT. Patient states she had a sinus CT which showed some concerns in her mychart. After reviewing I advised patient I will send to Dr. Abel and get back with her. Patient understood. Please review sinus CT and advise if sooner appointment is needed (apt 6/10/25)

## 2025-05-09 ENCOUNTER — OFFICE VISIT (OUTPATIENT)
Dept: FAMILY MEDICINE CLINIC | Age: 40
End: 2025-05-09

## 2025-05-09 VITALS
SYSTOLIC BLOOD PRESSURE: 122 MMHG | WEIGHT: 193 LBS | HEART RATE: 89 BPM | DIASTOLIC BLOOD PRESSURE: 89 MMHG | OXYGEN SATURATION: 98 % | RESPIRATION RATE: 19 BRPM | BODY MASS INDEX: 29.25 KG/M2 | TEMPERATURE: 97.4 F | HEIGHT: 68 IN

## 2025-05-09 DIAGNOSIS — M79.671 RIGHT FOOT PAIN: ICD-10-CM

## 2025-05-09 DIAGNOSIS — S91.331A PUNCTURE WOUND OF PLANTAR ASPECT OF RIGHT FOOT, INITIAL ENCOUNTER: Primary | ICD-10-CM

## 2025-05-09 DIAGNOSIS — E78.5 HYPERLIPIDEMIA, UNSPECIFIED HYPERLIPIDEMIA TYPE: ICD-10-CM

## 2025-05-09 LAB
BASOPHILS ABSOLUTE: 0.02 K/UL (ref 0–0.2)
BASOPHILS RELATIVE PERCENT: 0 % (ref 0–2)
EOSINOPHILS ABSOLUTE: 0 K/UL (ref 0.05–0.5)
EOSINOPHILS RELATIVE PERCENT: 0 % (ref 0–6)
HCT VFR BLD CALC: 42.7 % (ref 34–48)
HEMOGLOBIN: 13.2 G/DL (ref 11.5–15.5)
IMMATURE GRANULOCYTES %: 0 % (ref 0–5)
IMMATURE GRANULOCYTES ABSOLUTE: <0.03 K/UL (ref 0–0.58)
LYMPHOCYTES ABSOLUTE: 1.71 K/UL (ref 1.5–4)
LYMPHOCYTES RELATIVE PERCENT: 31 % (ref 20–42)
MCH RBC QN AUTO: 28.8 PG (ref 26–35)
MCHC RBC AUTO-ENTMCNC: 30.9 G/DL (ref 32–34.5)
MCV RBC AUTO: 93 FL (ref 80–99.9)
MONOCYTES ABSOLUTE: 0.42 K/UL (ref 0.1–0.95)
MONOCYTES RELATIVE PERCENT: 8 % (ref 2–12)
NEUTROPHILS ABSOLUTE: 3.34 K/UL (ref 1.8–7.3)
NEUTROPHILS RELATIVE PERCENT: 61 % (ref 43–80)
PDW BLD-RTO: 12.8 % (ref 11.5–15)
PLATELET # BLD: 267 K/UL (ref 130–450)
PMV BLD AUTO: 9.9 FL (ref 7–12)
RBC # BLD: 4.59 M/UL (ref 3.5–5.5)
WBC # BLD: 5.5 K/UL (ref 4.5–11.5)

## 2025-05-09 ASSESSMENT — ENCOUNTER SYMPTOMS
EYE DISCHARGE: 0
NAUSEA: 0
VOMITING: 0
DIARRHEA: 0
SHORTNESS OF BREATH: 0
COUGH: 0
SINUS PRESSURE: 0
EYE PAIN: 0
ABDOMINAL DISTENTION: 0
SORE THROAT: 0
EYE REDNESS: 0
WHEEZING: 0
BACK PAIN: 0

## 2025-05-09 NOTE — PROGRESS NOTES
Chief Complaint:   Foot Injury (Right foot stepped on adolfo nail)      History of Present Illness   HPI:  Jeanette Winchester is a 39 y.o. female who [facility] pierced her shoe and scraped the bottom of her R foot, complains of mild discomfort and abrasion of plantar aspect R foot    Prior to Visit Medications    Medication Sig Taking? Authorizing Provider   amoxicillin-clavulanate (AUGMENTIN) 875-125 MG per tablet Take 1 tablet by mouth 2 times daily for 5 days Yes Ra Skaggs DO   Fremanezumab-vfrm (AJOVY) 225 MG/1.5ML SOSY Inject 1 Dose into the skin every 30 days Yes ProviderUche MD   cetirizine (ZYRTEC) 10 MG tablet Take 1 tablet by mouth daily Yes ProviderUche MD   atorvastatin (LIPITOR) 40 MG tablet Take 1 tablet by mouth nightly Yes Zeina Camejo MD   desipramine (NORPRAMIN) 50 MG tablet Take 1 tablet by mouth nightly Yes ProviderUche MD   zonisamide (ZONEGRAN) 100 MG capsule take 1 capsule by mouth once daily Yes Provider, MD Uche   fluticasone (FLONASE) 50 MCG/ACT nasal spray 1 spray by Each Nostril route in the morning. Yes Horace Dean DO   naratriptan (AMERGE) 2.5 MG tablet Take 1 tablet by mouth as needed Yes ProviderUche MD   Levonorgestrel (MIRENA, 52 MG, IU) by IntraUTERine route Yes ProviderUche MD       Review of Systems   Review of Systems   Constitutional:  Negative for chills and fever.   HENT:  Negative for ear pain, sinus pressure and sore throat.    Eyes:  Negative for pain, discharge and redness.   Respiratory:  Negative for cough, shortness of breath and wheezing.    Cardiovascular:  Negative for chest pain.   Gastrointestinal:  Negative for abdominal distention, diarrhea, nausea and vomiting.   Genitourinary:  Negative for dysuria and frequency.   Musculoskeletal:  Negative for arthralgias and back pain.   Skin:  Positive for wound. Negative for rash.   Neurological:  Negative for weakness

## 2025-05-10 LAB
ALBUMIN: 4.1 G/DL (ref 3.5–5.2)
ALP BLD-CCNC: 98 U/L (ref 35–104)
ALT SERPL-CCNC: 17 U/L (ref 0–35)
ANION GAP SERPL CALCULATED.3IONS-SCNC: 11 MMOL/L (ref 7–16)
AST SERPL-CCNC: 22 U/L (ref 0–35)
BILIRUB SERPL-MCNC: 0.4 MG/DL (ref 0–1.2)
BUN BLDV-MCNC: 10 MG/DL (ref 6–20)
CALCIUM SERPL-MCNC: 9 MG/DL (ref 8.6–10)
CHLORIDE BLD-SCNC: 107 MMOL/L (ref 98–107)
CHOLESTEROL, FASTING: 125 MG/DL
CO2: 21 MMOL/L (ref 22–29)
CREAT SERPL-MCNC: 0.9 MG/DL (ref 0.5–1)
GFR, ESTIMATED: 87 ML/MIN/1.73M2
GLUCOSE FASTING: 80 MG/DL (ref 74–99)
HDLC SERPL-MCNC: 38 MG/DL
LDL CHOLESTEROL: 58 MG/DL
POTASSIUM SERPL-SCNC: 4.3 MMOL/L (ref 3.5–5.1)
SODIUM BLD-SCNC: 139 MMOL/L (ref 136–145)
TOTAL PROTEIN: 7 G/DL (ref 6.4–8.3)
TRIGLYCERIDE, FASTING: 143 MG/DL
TSH SERPL DL<=0.05 MIU/L-ACNC: 0.82 UIU/ML (ref 0.27–4.2)
VLDLC SERPL CALC-MCNC: 29 MG/DL

## 2025-06-04 ENCOUNTER — OFFICE VISIT (OUTPATIENT)
Dept: FAMILY MEDICINE CLINIC | Age: 40
End: 2025-06-04
Payer: COMMERCIAL

## 2025-06-04 VITALS
DIASTOLIC BLOOD PRESSURE: 78 MMHG | WEIGHT: 198.1 LBS | BODY MASS INDEX: 31.09 KG/M2 | SYSTOLIC BLOOD PRESSURE: 120 MMHG | HEIGHT: 67 IN | TEMPERATURE: 97.6 F | OXYGEN SATURATION: 99 % | HEART RATE: 100 BPM

## 2025-06-04 DIAGNOSIS — G43.909 MIGRAINE WITHOUT STATUS MIGRAINOSUS, NOT INTRACTABLE, UNSPECIFIED MIGRAINE TYPE: ICD-10-CM

## 2025-06-04 DIAGNOSIS — J32.8 OTHER CHRONIC SINUSITIS: ICD-10-CM

## 2025-06-04 DIAGNOSIS — E78.5 HYPERLIPIDEMIA, UNSPECIFIED HYPERLIPIDEMIA TYPE: Primary | ICD-10-CM

## 2025-06-04 PROCEDURE — 99214 OFFICE O/P EST MOD 30 MIN: CPT | Performed by: INTERNAL MEDICINE

## 2025-06-04 PROCEDURE — G8427 DOCREV CUR MEDS BY ELIG CLIN: HCPCS | Performed by: INTERNAL MEDICINE

## 2025-06-04 PROCEDURE — 1036F TOBACCO NON-USER: CPT | Performed by: INTERNAL MEDICINE

## 2025-06-04 PROCEDURE — G8417 CALC BMI ABV UP PARAM F/U: HCPCS | Performed by: INTERNAL MEDICINE

## 2025-06-04 RX ORDER — ATORVASTATIN CALCIUM 40 MG/1
40 TABLET, FILM COATED ORAL NIGHTLY
Qty: 90 TABLET | Refills: 1 | Status: SHIPPED | OUTPATIENT
Start: 2025-06-04

## 2025-06-04 ASSESSMENT — ENCOUNTER SYMPTOMS
NAUSEA: 0
SINUS PAIN: 0
ABDOMINAL PAIN: 0
EYE PAIN: 0
SORE THROAT: 0
BLOOD IN STOOL: 0
SHORTNESS OF BREATH: 0
EYE DISCHARGE: 0

## 2025-06-04 NOTE — PROGRESS NOTES
Chief Complaint   Patient presents with    Hyperlipidemia     Pt here for follow up on hyperlipidemia, pt reports feeling better    Discuss Labs     Completed on 05/09/2025     Medication Refill    Health Maintenance     Pap - done with Dr. Preciado last year        HPI:  Patient is here for follow-up     Feeling okay    Labs , CT Sinus - completed  Has an appt with ENT - next week      Allergy and Medications are reviewed and updated.  Past Medical History, Surgical History, and Family History has been reviewed and updated.    Review of Systems:  Review of Systems   Constitutional:  Negative for chills and fever.   HENT:  Negative for congestion, sinus pain and sore throat.    Eyes:  Negative for pain and discharge.   Respiratory:  Negative for shortness of breath (No new SOb).    Cardiovascular:  Negative for chest pain.   Gastrointestinal:  Negative for abdominal pain, blood in stool and nausea.   Genitourinary:  Negative for flank pain and frequency.   Musculoskeletal:  Negative for neck pain.   Hematological:  Does not bruise/bleed easily.   Psychiatric/Behavioral:  Negative for suicidal ideas.          Vitals:    06/04/25 1442   BP: 120/78   BP Site: Left Upper Arm   Patient Position: Sitting   Pulse: 100   Temp: 97.6 °F (36.4 °C)   TempSrc: Temporal   SpO2: 99%   Weight: 89.9 kg (198 lb 1.6 oz)   Height: 1.702 m (5' 7\")       Physical Exam  Vitals reviewed.   Constitutional:       Appearance: She is well-developed.   HENT:      Head: Normocephalic and atraumatic.      Nose: Nose normal.   Eyes:      Conjunctiva/sclera: Conjunctivae normal.      Pupils: Pupils are equal, round, and reactive to light.   Neck:      Vascular: No JVD.   Cardiovascular:      Rate and Rhythm: Normal rate and regular rhythm.   Pulmonary:      Effort: Pulmonary effort is normal.      Breath sounds: Normal breath sounds.   Abdominal:      General: Bowel sounds are normal.      Palpations: Abdomen is soft.   Musculoskeletal:

## 2025-06-10 ENCOUNTER — OFFICE VISIT (OUTPATIENT)
Dept: ENT CLINIC | Age: 40
End: 2025-06-10
Payer: COMMERCIAL

## 2025-06-10 VITALS
DIASTOLIC BLOOD PRESSURE: 84 MMHG | HEART RATE: 88 BPM | TEMPERATURE: 97.8 F | WEIGHT: 197 LBS | HEIGHT: 68 IN | SYSTOLIC BLOOD PRESSURE: 137 MMHG | BODY MASS INDEX: 29.86 KG/M2 | OXYGEN SATURATION: 100 %

## 2025-06-10 DIAGNOSIS — H69.93 ETD (EUSTACHIAN TUBE DYSFUNCTION), BILATERAL: Primary | ICD-10-CM

## 2025-06-10 PROCEDURE — 1036F TOBACCO NON-USER: CPT | Performed by: OTOLARYNGOLOGY

## 2025-06-10 PROCEDURE — G8417 CALC BMI ABV UP PARAM F/U: HCPCS | Performed by: OTOLARYNGOLOGY

## 2025-06-10 PROCEDURE — G8427 DOCREV CUR MEDS BY ELIG CLIN: HCPCS | Performed by: OTOLARYNGOLOGY

## 2025-06-10 PROCEDURE — 99203 OFFICE O/P NEW LOW 30 MIN: CPT | Performed by: OTOLARYNGOLOGY

## 2025-06-10 RX ORDER — AZELASTINE 1 MG/ML
2 SPRAY, METERED NASAL 2 TIMES DAILY
Qty: 30 ML | Refills: 1 | Status: SHIPPED | OUTPATIENT
Start: 2025-06-10

## 2025-06-12 ASSESSMENT — ENCOUNTER SYMPTOMS
VOMITING: 0
SINUS PRESSURE: 1
COUGH: 0
RHINORRHEA: 1
SHORTNESS OF BREATH: 0

## 2025-06-12 NOTE — PROGRESS NOTES
• Complication of anesthesia     states spinal did not last entire case   • Ethmoid sinusitis 2020   • H/O cardiovascular stress test 2023    Nuclear Lexiscan Stress Test   • History of migraine 2020   • Migraine without aura and without status migrainosus, not intractable 2020   • Postpartum depression     with first child   • Scoliosis    • Thrombophilia      Past Surgical History:   Procedure Laterality Date   • ABDOMEN SURGERY       section x 2   •  SECTION     • CHOLECYSTECTOMY     • MOLE REMOVAL Right 2022       Current Outpatient Medications:   •  azelastine (ASTELIN) 0.1 % nasal spray, 2 sprays by Nasal route 2 times daily Use in each nostril as directed, Disp: 30 mL, Rfl: 1  •  atorvastatin (LIPITOR) 40 MG tablet, Take 1 tablet by mouth nightly, Disp: 90 tablet, Rfl: 1  •  Fremanezumab-vfrm (AJOVY) 225 MG/1.5ML SOSY, Inject 1 Dose into the skin every 30 days, Disp: , Rfl:   •  cetirizine (ZYRTEC) 10 MG tablet, Take 1 tablet by mouth daily, Disp: , Rfl:   •  desipramine (NORPRAMIN) 50 MG tablet, Take 1 tablet by mouth nightly, Disp: , Rfl:   •  zonisamide (ZONEGRAN) 100 MG capsule, take 1 capsule by mouth once daily, Disp: , Rfl:   •  fluticasone (FLONASE) 50 MCG/ACT nasal spray, 1 spray by Each Nostril route in the morning., Disp: 2 each, Rfl: 1  •  naratriptan (AMERGE) 2.5 MG tablet, Take 1 tablet by mouth as needed, Disp: , Rfl:   •  Levonorgestrel (MIRENA, 52 MG, IU), by IntraUTERine route, Disp: , Rfl:   Vyvanse [lisdexamfetamine]  Social History     Tobacco Use   • Smoking status: Former     Current packs/day: 0.00     Types: Cigarettes     Quit date: 2017     Years since quittin.4   • Smokeless tobacco: Never   Vaping Use   • Vaping status: Never Used   Substance Use Topics   • Alcohol use: No   • Drug use: No     Family History   Problem Relation Age of Onset   • High Blood Pressure Mother    • High Cholesterol Mother    • High Cholesterol Father

## 2025-06-24 ENCOUNTER — PATIENT MESSAGE (OUTPATIENT)
Dept: FAMILY MEDICINE CLINIC | Age: 40
End: 2025-06-24

## 2025-06-26 ENCOUNTER — TELEPHONE (OUTPATIENT)
Dept: FAMILY MEDICINE CLINIC | Age: 40
End: 2025-06-26

## 2025-06-26 NOTE — TELEPHONE ENCOUNTER
Called patient to advise employee form is completed for the [facility]. Unable to leave a message as mailbox is not set up. Sent message via my chart

## 2025-07-25 ENCOUNTER — OFFICE VISIT (OUTPATIENT)
Dept: ENT CLINIC | Age: 40
End: 2025-07-25
Payer: COMMERCIAL

## 2025-07-25 ENCOUNTER — PROCEDURE VISIT (OUTPATIENT)
Dept: AUDIOLOGY | Age: 40
End: 2025-07-25

## 2025-07-25 VITALS
HEIGHT: 68 IN | OXYGEN SATURATION: 94 % | DIASTOLIC BLOOD PRESSURE: 87 MMHG | WEIGHT: 203 LBS | RESPIRATION RATE: 16 BRPM | HEART RATE: 108 BPM | BODY MASS INDEX: 30.77 KG/M2 | SYSTOLIC BLOOD PRESSURE: 118 MMHG

## 2025-07-25 DIAGNOSIS — H69.93 DYSFUNCTION OF BOTH EUSTACHIAN TUBES: Primary | ICD-10-CM

## 2025-07-25 DIAGNOSIS — H69.93 ETD (EUSTACHIAN TUBE DYSFUNCTION), BILATERAL: Primary | ICD-10-CM

## 2025-07-25 PROCEDURE — 99213 OFFICE O/P EST LOW 20 MIN: CPT | Performed by: OTOLARYNGOLOGY

## 2025-07-25 PROCEDURE — 1036F TOBACCO NON-USER: CPT | Performed by: OTOLARYNGOLOGY

## 2025-07-25 PROCEDURE — G8417 CALC BMI ABV UP PARAM F/U: HCPCS | Performed by: OTOLARYNGOLOGY

## 2025-07-25 PROCEDURE — G8427 DOCREV CUR MEDS BY ELIG CLIN: HCPCS | Performed by: OTOLARYNGOLOGY

## 2025-07-25 NOTE — PROGRESS NOTES
This patient was referred for audiometric and tympanometric testing by Dr. Abel due to eustachian tube dysfunction.     Audiometry using pure tone air and bone conduction testing revealed hearing sensitivity within normal limits, bilaterally. Reliability was good. Speech reception thresholds were in good agreement with the pure tone averages, bilaterally. Speech discrimination scores were excellent, bilaterally.    Tympanometry revealed normal middle ear peak pressure and compliance, bilaterally.    The results were reviewed with the patient and ordering provider.     Recommendations for follow up will be made pending ordering provider consult.    Edwina Trotter/CCC-A  [place] Lic [id number]  Electronically signed by Edwina Trotter on 7/25/2025 at 11:32 AM

## 2025-07-25 NOTE — PROGRESS NOTES
2023    Nuclear Lexiscan Stress Test    History of migraine 2020    Migraine without aura and without status migrainosus, not intractable 2020    Postpartum depression     with first child    Scoliosis     Thrombophilia      Past Surgical History:   Procedure Laterality Date    ABDOMEN SURGERY       section x 2     SECTION      CHOLECYSTECTOMY      MOLE REMOVAL Right 2022       Current Outpatient Medications:     azelastine (ASTELIN) 0.1 % nasal spray, 2 sprays by Nasal route 2 times daily Use in each nostril as directed, Disp: 30 mL, Rfl: 1    atorvastatin (LIPITOR) 40 MG tablet, Take 1 tablet by mouth nightly, Disp: 90 tablet, Rfl: 1    Fremanezumab-vfrm (AJOVY) 225 MG/1.5ML SOSY, Inject 1 Dose into the skin every 30 days, Disp: , Rfl:     cetirizine (ZYRTEC) 10 MG tablet, Take 1 tablet by mouth daily, Disp: , Rfl:     desipramine (NORPRAMIN) 50 MG tablet, Take 1 tablet by mouth nightly, Disp: , Rfl:     zonisamide (ZONEGRAN) 100 MG capsule, take 1 capsule by mouth once daily, Disp: , Rfl:     fluticasone (FLONASE) 50 MCG/ACT nasal spray, 1 spray by Each Nostril route in the morning., Disp: 2 each, Rfl: 1    naratriptan (AMERGE) 2.5 MG tablet, Take 1 tablet by mouth as needed, Disp: , Rfl:     Levonorgestrel (MIRENA, 52 MG, IU), by IntraUTERine route, Disp: , Rfl:   Vyvanse [lisdexamfetamine]  Social History     Tobacco Use    Smoking status: Former     Current packs/day: 0.00     Types: Cigarettes     Quit date: 2017     Years since quittin.6    Smokeless tobacco: Never   Vaping Use    Vaping status: Never Used   Substance Use Topics    Alcohol use: No    Drug use: No     Family History   Problem Relation Age of Onset    High Blood Pressure Mother     High Cholesterol Mother     High Cholesterol Father     High Blood Pressure Father     High Cholesterol Brother     High Blood Pressure Brother     Diabetes Maternal Grandmother     High Blood Pressure Maternal

## 2025-08-29 ENCOUNTER — PATIENT MESSAGE (OUTPATIENT)
Dept: FAMILY MEDICINE CLINIC | Age: 40
End: 2025-08-29